# Patient Record
Sex: FEMALE | Race: WHITE | HISPANIC OR LATINO | Employment: FULL TIME | URBAN - METROPOLITAN AREA
[De-identification: names, ages, dates, MRNs, and addresses within clinical notes are randomized per-mention and may not be internally consistent; named-entity substitution may affect disease eponyms.]

---

## 2017-01-15 ENCOUNTER — HOSPITAL ENCOUNTER (EMERGENCY)
Facility: MEDICAL CENTER | Age: 22
End: 2017-01-16
Attending: EMERGENCY MEDICINE
Payer: MEDICAID

## 2017-01-15 DIAGNOSIS — Z34.92 NORMAL PREGNANCY IN SECOND TRIMESTER: ICD-10-CM

## 2017-01-15 DIAGNOSIS — K80.20 SYMPTOMATIC CHOLELITHIASIS: ICD-10-CM

## 2017-01-15 DIAGNOSIS — O99.891 BACTERIURIA DURING PREGNANCY IN SECOND TRIMESTER: ICD-10-CM

## 2017-01-15 DIAGNOSIS — R82.71 BACTERIURIA DURING PREGNANCY IN SECOND TRIMESTER: ICD-10-CM

## 2017-01-15 LAB
BASOPHILS # BLD AUTO: 0.2 % (ref 0–1.8)
BASOPHILS # BLD: 0.02 K/UL (ref 0–0.12)
EOSINOPHIL # BLD AUTO: 0.02 K/UL (ref 0–0.51)
EOSINOPHIL NFR BLD: 0.2 % (ref 0–6.9)
ERYTHROCYTE [DISTWIDTH] IN BLOOD BY AUTOMATED COUNT: 40.7 FL (ref 35.9–50)
HCT VFR BLD AUTO: 40 % (ref 37–47)
HGB BLD-MCNC: 13.2 G/DL (ref 12–16)
IMM GRANULOCYTES # BLD AUTO: 0.03 K/UL (ref 0–0.11)
IMM GRANULOCYTES NFR BLD AUTO: 0.3 % (ref 0–0.9)
LYMPHOCYTES # BLD AUTO: 1.26 K/UL (ref 1–4.8)
LYMPHOCYTES NFR BLD: 13.8 % (ref 22–41)
MCH RBC QN AUTO: 25.9 PG (ref 27–33)
MCHC RBC AUTO-ENTMCNC: 33 G/DL (ref 33.6–35)
MCV RBC AUTO: 78.6 FL (ref 81.4–97.8)
MONOCYTES # BLD AUTO: 0.58 K/UL (ref 0–0.85)
MONOCYTES NFR BLD AUTO: 6.4 % (ref 0–13.4)
NEUTROPHILS # BLD AUTO: 7.19 K/UL (ref 2–7.15)
NEUTROPHILS NFR BLD: 79.1 % (ref 44–72)
NRBC # BLD AUTO: 0 K/UL
NRBC BLD AUTO-RTO: 0 /100 WBC
PLATELET # BLD AUTO: 223 K/UL (ref 164–446)
PMV BLD AUTO: 10 FL (ref 9–12.9)
RBC # BLD AUTO: 5.09 M/UL (ref 4.2–5.4)
WBC # BLD AUTO: 9.1 K/UL (ref 4.8–10.8)

## 2017-01-15 PROCEDURE — 85025 COMPLETE CBC W/AUTO DIFF WBC: CPT

## 2017-01-15 PROCEDURE — 80053 COMPREHEN METABOLIC PANEL: CPT

## 2017-01-15 PROCEDURE — 96375 TX/PRO/DX INJ NEW DRUG ADDON: CPT

## 2017-01-15 PROCEDURE — 84702 CHORIONIC GONADOTROPIN TEST: CPT

## 2017-01-15 PROCEDURE — 96374 THER/PROPH/DIAG INJ IV PUSH: CPT

## 2017-01-15 PROCEDURE — 96361 HYDRATE IV INFUSION ADD-ON: CPT

## 2017-01-15 PROCEDURE — 700111 HCHG RX REV CODE 636 W/ 250 OVERRIDE (IP): Performed by: EMERGENCY MEDICINE

## 2017-01-15 PROCEDURE — 81001 URINALYSIS AUTO W/SCOPE: CPT

## 2017-01-15 PROCEDURE — 83690 ASSAY OF LIPASE: CPT

## 2017-01-15 PROCEDURE — 99284 EMERGENCY DEPT VISIT MOD MDM: CPT

## 2017-01-15 RX ORDER — ONDANSETRON 2 MG/ML
4 INJECTION INTRAMUSCULAR; INTRAVENOUS ONCE
Status: COMPLETED | OUTPATIENT
Start: 2017-01-16 | End: 2017-01-15

## 2017-01-15 RX ORDER — SODIUM CHLORIDE 9 MG/ML
1000 INJECTION, SOLUTION INTRAVENOUS ONCE
Status: COMPLETED | OUTPATIENT
Start: 2017-01-16 | End: 2017-01-16

## 2017-01-15 RX ADMIN — FENTANYL CITRATE 50 MCG: 50 INJECTION, SOLUTION INTRAMUSCULAR; INTRAVENOUS at 23:55

## 2017-01-15 RX ADMIN — ONDANSETRON 4 MG: 2 INJECTION, SOLUTION INTRAMUSCULAR; INTRAVENOUS at 23:55

## 2017-01-15 ASSESSMENT — PAIN SCALES - GENERAL: PAINLEVEL_OUTOF10: 10

## 2017-01-15 ASSESSMENT — LIFESTYLE VARIABLES: DO YOU DRINK ALCOHOL: NO

## 2017-01-15 NOTE — ED AVS SNAPSHOT
After Visit Summary                                                                                                                Merari Mcghee   MRN: [id number]    Department:  [facility], Emergency Dept   Date of Visit:  1/15/2017            [facility], Emergency Dept    [address]    [place] [address]    Phone:  [phone]      You were seen by     Rosey Atkinson M.D.      Your Diagnosis Was     Symptomatic cholelithiasis     K80.20       These are the medications you received during your hospitalization from 01/15/2017 2211 to 01/16/2017 0207     Date/Time Order Dose Route Action    01/16/2017 0000 NS infusion 1,000 mL 1,000 mL Intravenous New Bag    01/15/2017 2355 fentaNYL (SUBLIMAZE) injection 50 mcg 50 mcg Intravenous Given    01/15/2017 2355 ondansetron (ZOFRAN) syringe/vial injection 4 mg 4 mg Intravenous Given      Follow-up Information     1. Follow up with Shelton Davis M.D..    Specialties:  Surgery, Radiology    Contact information    [address]  [address]  [facility] [address] [phone]        Medication Information     Review all of your home medications and newly ordered medications with your primary doctor and/or pharmacist as soon as possible. Follow medication instructions as directed by your doctor and/or pharmacist.     Please keep your complete medication list with you and share with your physician. Update the information when medications are discontinued, doses are changed, or new medications (including over-the-counter products) are added; and carry medication information at all times in the event of emergency situations.               Medication List      START taking these medications        Instructions    nitrofurantoin monohydr macro 100 MG Caps   Commonly known as:  MACROBID    Take 1 Cap by mouth 2 times a day for 3 days.   Dose:  100 mg       ondansetron 4 MG Tbdp   Commonly known as:  ZOFRAN ODT    Take 1 Tab by mouth every 8  hours as needed for Nausea/Vomiting.   Dose:  4 mg               Procedures and tests performed during your visit     BETA-HCG QUANTITATIVE SERUM    CARDIAC MONITORING    CBC WITH DIFFERENTIAL    COMP METABOLIC PANEL    ESTIMATED GFR    IV SALINE LOCK    LIPASE    UR BILI ICTOTEST    URINALYSIS CULTURE, IF INDICATED    URINE MICROSCOPIC (W/UA)    US-GALLBLADDER    US-OB LIMITED TRANSABDOMINAL        Discharge Instructions       Cólico biliar  (Biliary Colic)  El cólico biliar es un dolor en la región superior del abdomen. El dolor:  · Generalmente, se siente en la región superior derecha del abdomen, cookie también puede aparecer en la región central, alessandra debajo del esternón.  · Puede irradiarse a la espalda, hacia el omóplato derecho.  · Puede ser adali o discontinuo.  · Puede estar acompañado de náuseas y vómitos.  La mayor parte del tiempo, desaparece en el término de 1 a 5 horas. Manish vez que el dolor intenso desaparece, puede kathi dolor abdominal leve que se prolonga sandip aproximadamente 24 horas.  La causa del cólico biliar es manish obstrucción en las vías biliares. Las vías biliares son conductos que transportan bilis, un líquido que ayuda a digerir las grasas, desde la vesícula biliar hasta el intestino fabian. Generalmente, el cólico biliar ocurre después de comer, cuando el sistema digestivo necesita bilis. El dolor aparece cuando las células musculares se contraen bruscamente para intentar  la obstrucción, para que la bilis pueda pasar.  INSTRUCCIONES PARA EL CUIDADO EN EL HOGAR  · [place] los medicamentos solamente liam se lo haya indicado el médico.  · Ayala suficiente líquido para mantener la orina gaye o de color amarillo pálido.  · Evite los alimentos grasos y fritos. Claudia tipo de alimentos aumentan la demanda de bilis del organismo.  · Evite los alimentos que intensifican el dolor.  · No coma en exceso.  · No ingiera manish comida abundante después de ayunar.  SOLICITE ATENCIÓN MÉDICA  SI:  · Tiene fiebre.  · El dolor empeora.  · Vomita.  · Tiene náuseas que le impiden la ingesta de comidas y bebidas.  SOLICITE ATENCIÓN MÉDICA DE INMEDIATO SI:  · De manera repentina, tiene fiebre y escalofríos.  · Observa manish coloración amarillenta (ictericia) de:  ¨ La piel.  ¨ Las partes vijay de los ojos.  ¨ Las membranas mucosas.  · Siente dolor continuo o intenso que no se isa con los medicamentos.  · Tiene náuseas y vómitos que no se alivian con los medicamentos.  · Tiene mareos o se desmaya.     Esta información no tiene liam fin reemplazar el consejo del médico. Asegúrese de hacerle al médico cualquier pregunta que tenga.     Document Released: 03/26/2009 Document Revised: 05/03/2016  Whitt[employer] Interactive Patient Education ©2016 [employer]            Patient Information     Patient Information    Following emergency treatment: all patient requiring follow-up care must return either to a private physician or a clinic if your condition worsens before you are able to obtain further medical attention, please return to the emergency room.     Billing Information    At [facility], we work to make the billing process streamlined for our patients.  Our Representatives are here to answer any questions you may have regarding your hospital bill.  If you have insurance coverage and have supplied your insurance information to us, we will submit a claim to your insurer on your behalf.  Should you have any questions regarding your bill, we can be reached online or by phone as follows:  Online: You are able pay your bills online or live chat with our representatives about any billing questions you may have. We are here to help Monday - Friday from 8:00am to 7:30pm and 9:00am - 12:00pm on Saturdays.  Please visit [email or web address]  for more information.   Phone:  [phone] or [phone]    Please note that your emergency physician, surgeon, pathologist, radiologist,  anesthesiologist, and other specialists are not employed by [facility] [place] and will therefore bill separately for their services.  Please contact them directly for any questions concerning their bills at the numbers below:     Emergency Physician Services:  [phone]  [facility]:  [phone]  [facility]:  [phone]  [facility]:  [phone]    1. Your final bill may vary from the amount quoted upon discharge if all procedures are not complete at that time, or if your doctor has additional procedures of which we are not aware. You will receive an additional bill if you return to the Emergency Department at [facility] for suture removal regardless of the facility of which the sutures were placed.     2. Please arrange for settlement of this account at the emergency registration.    3. All self-pay accounts are due in full at the time of treatment.  If you are unable to meet this obligation then payment is expected within 4-5 days.     4. If you have had radiology studies (CT, X-ray, Ultrasound, MRI), you have received a preliminary result during your emergency department visit. Please contact the radiology department [phone] to receive a copy of your final result. Please discuss the Final result with your primary physician or with the follow up physician provided.     Crisis Hotline:  [place] Crisis Hotline:  9-317-VJXAWOP or 1-827.902.7073  [place] Crisis Hotline:    1-724.137.8246 or 561-599-4143         ED Discharge Follow Up Questions    1. In order to provide you with very good care, we would like to follow up with a phone call in the next few days.  May we have your permission to contact you?     YES /  NO    2. What is the best phone number to call you? (       )_____-__________    3. What is the best time to call you?      Morning  /  Afternoon  /  Evening                   Patient Signature:   ____________________________________________________________    Date:  ____________________________________________________________      Your appointments     Jan 24, 2017  2:00 PM   New OB Exam with PC INTAKE, NEW OB   [facility])    [address] [id number] [phone]

## 2017-01-15 NOTE — ED AVS SNAPSHOT
Wavecraft Access Code: Activation code not generated  Current Wavecraft Status: Patient Declined    Your email address is not on file at [facility].  Email Addresses are required for you to sign up for Wavecraft, please contact [phone] to verify your personal information and to provide your email address prior to attempting to register for Wavecraft.    Merari Mcghee  [address]   [address]   [place] [address]    [employer]  A secure, online tool to manage your health information     [facility]’s Wavecraft® is a secure, online tool that connects you to your personalized health information from the privacy of your home -- day or night - making it very easy for you to manage your healthcare. Once the activation process is completed, you can even access your medical information using the Wavecraft sandip, which is available for free in the Apple Sandip store or Google Play store.     To learn more about Wavecraft, visit [email or web address]    There are two levels of access available (as shown below):   My Chart Features  [facility] Primary Care Doctor [facility]  Specialists [facility]  Urgent  Care Non-[facility] Primary Care Doctor   Email your healthcare team securely and privately 24/7 X X X    Manage appointments: schedule your next appointment; view details of past/upcoming appointments X      Request prescription refills. X      View recent personal medical records, including lab and immunizations X X X X   View health record, including health history, allergies, medications X X X X   Read reports about your outpatient visits, procedures, consult and ER notes X X X X   See your discharge summary, which is a recap of your hospital and/or ER visit that includes your diagnosis, lab results, and care plan X X  X     How to register for [employer]:  Once your e-mail address has been verified, follow the following steps to sign up for [employer].     1. Go to  [email or web address]  2. Click on the Sign Up Now box, which takes you to the  New Member Sign Up page. You will need to provide the following information:  a. Enter your [employer] Access Code exactly as it appears at the top of this page. (You will not need to use this code after you’ve completed the sign-up process. If you do not sign up before the expiration date, you must request a new code.)   b. Enter your date of birth.   c. Enter your home email address.   d. Click Submit, and follow the next screen’s instructions.  3. Create a [employer] ID. This will be your [employer] login ID and cannot be changed, so think of one that is secure and easy to remember.  4. Create a [employer] password. You can change your password at any time.  5. Enter your Password Reset Question and Answer. This can be used at a later time if you forget your password.   6. Enter your e-mail address. This allows you to receive e-mail notifications when new information is available in [employer].  7. Click Sign Up. You can now view your health information.    For assistance activating your [employer] account, call [phone]

## 2017-01-15 NOTE — ED AVS SNAPSHOT
1/16/2017          Merari Mcghee  [address]   [address]   [place] [address]    Dear Merari:    [facility] wants to ensure your discharge home is safe and you or your loved ones have had all your questions answered regarding your care after you leave the hospital.    You may receive a telephone call within two days of your discharge.  This call is to make certain you understand your discharge instructions as well as ensure we provided you with the best care possible during your stay with us.     The call will only last approximately 3-5 minutes and will be done by a nurse.    Once again, we want to ensure your discharge home is safe and that you have a clear understanding of any next steps in your care.  If you have any questions or concerns, please do not hesitate to contact us, we are here for you.  Thank you for choosing [facility] for your healthcare needs.    Sincerely,    Trey Rose    [facility]

## 2017-01-16 ENCOUNTER — APPOINTMENT (OUTPATIENT)
Dept: RADIOLOGY | Facility: MEDICAL CENTER | Age: 22
End: 2017-01-16
Attending: EMERGENCY MEDICINE
Payer: MEDICAID

## 2017-01-16 VITALS
HEART RATE: 92 BPM | HEIGHT: 66 IN | SYSTOLIC BLOOD PRESSURE: 124 MMHG | TEMPERATURE: 97.9 F | OXYGEN SATURATION: 99 % | DIASTOLIC BLOOD PRESSURE: 84 MMHG | BODY MASS INDEX: 29.34 KG/M2 | RESPIRATION RATE: 18 BRPM | WEIGHT: 182.54 LBS

## 2017-01-16 LAB
ALBUMIN SERPL BCP-MCNC: 3.9 G/DL (ref 3.2–4.9)
ALBUMIN/GLOB SERPL: 1.1 G/DL
ALP SERPL-CCNC: 117 U/L (ref 30–99)
ALT SERPL-CCNC: 57 U/L (ref 2–50)
ANION GAP SERPL CALC-SCNC: 11 MMOL/L (ref 0–11.9)
APPEARANCE UR: ABNORMAL
AST SERPL-CCNC: 34 U/L (ref 12–45)
B-HCG SERPL-ACNC: ABNORMAL MIU/ML (ref 0–5)
BACTERIA #/AREA URNS HPF: ABNORMAL /HPF
BILIRUB SERPL-MCNC: 0.8 MG/DL (ref 0.1–1.5)
BILIRUB UR QL CFM: POSITIVE
BUN SERPL-MCNC: 6 MG/DL (ref 8–22)
CALCIUM SERPL-MCNC: 9.1 MG/DL (ref 8.5–10.5)
CHLORIDE SERPL-SCNC: 100 MMOL/L (ref 96–112)
CO2 SERPL-SCNC: 23 MMOL/L (ref 20–33)
COLOR UR: ABNORMAL
CREAT SERPL-MCNC: 0.52 MG/DL (ref 0.5–1.4)
CULTURE IF INDICATED INDCX: NO UA CULTURE
EPI CELLS #/AREA URNS HPF: ABNORMAL /HPF
GFR SERPL CREATININE-BSD FRML MDRD: >60 ML/MIN/1.73 M 2
GLOBULIN SER CALC-MCNC: 3.5 G/DL (ref 1.9–3.5)
GLUCOSE SERPL-MCNC: 105 MG/DL (ref 65–99)
GLUCOSE UR STRIP.AUTO-MCNC: NEGATIVE MG/DL
KETONES UR STRIP.AUTO-MCNC: 80 MG/DL
LEUKOCYTE ESTERASE UR QL STRIP.AUTO: NEGATIVE
LIPASE SERPL-CCNC: 19 U/L (ref 11–82)
MICRO URNS: ABNORMAL
MUCOUS THREADS #/AREA URNS HPF: ABNORMAL /HPF
NITRITE UR QL STRIP.AUTO: NEGATIVE
PH UR STRIP.AUTO: 6 [PH]
POTASSIUM SERPL-SCNC: 3.3 MMOL/L (ref 3.6–5.5)
PROT SERPL-MCNC: 7.4 G/DL (ref 6–8.2)
PROT UR QL STRIP: 30 MG/DL
RBC # URNS HPF: ABNORMAL /HPF
RBC UR QL AUTO: NEGATIVE
SODIUM SERPL-SCNC: 134 MMOL/L (ref 135–145)
SP GR UR STRIP.AUTO: 1.03
WBC #/AREA URNS HPF: ABNORMAL /HPF

## 2017-01-16 PROCEDURE — 76815 OB US LIMITED FETUS(S): CPT

## 2017-01-16 PROCEDURE — 700105 HCHG RX REV CODE 258: Performed by: EMERGENCY MEDICINE

## 2017-01-16 PROCEDURE — 76705 ECHO EXAM OF ABDOMEN: CPT

## 2017-01-16 RX ORDER — NITROFURANTOIN 25; 75 MG/1; MG/1
100 CAPSULE ORAL 2 TIMES DAILY
Qty: 6 CAP | Refills: 0 | Status: SHIPPED | OUTPATIENT
Start: 2017-01-16 | End: 2017-01-19

## 2017-01-16 RX ORDER — ONDANSETRON 4 MG/1
4 TABLET, ORALLY DISINTEGRATING ORAL EVERY 8 HOURS PRN
Qty: 10 TAB | Refills: 0 | Status: SHIPPED | OUTPATIENT
Start: 2017-01-16 | End: 2017-06-06

## 2017-01-16 RX ADMIN — SODIUM CHLORIDE 1000 ML: 9 INJECTION, SOLUTION INTRAVENOUS at 00:00

## 2017-01-16 ASSESSMENT — PAIN SCALES - GENERAL: PAINLEVEL_OUTOF10: 6

## 2017-01-16 NOTE — ED NOTES
Pt ambulatory to triage c/o upper abdominal pain with associated N/V/D since yesterday.  Pt reports hx pancreatitis & is also 17 weeks pregnant.

## 2017-01-16 NOTE — DISCHARGE INSTRUCTIONS
Cólico biliar  (Biliary Colic)  El cólico biliar es un dolor en la región superior del abdomen. El dolor:  · Generalmente, se siente en la región superior derecha del abdomen, cookie también puede aparecer en la región central, alessandra debajo del esternón.  · Puede irradiarse a la espalda, hacia el omóplato derecho.  · Puede ser adali o discontinuo.  · Puede estar acompañado de náuseas y vómitos.  La mayor parte del tiempo, desaparece en el término de 1 a 5 horas. Manish vez que el dolor intenso desaparece, puede kathi dolor abdominal leve que se prolonga sandip aproximadamente 24 horas.  La causa del cólico biliar es manish obstrucción en las vías biliares. Las vías biliares son conductos que transportan bilis, un líquido que ayuda a digerir las grasas, desde la vesícula biliar hasta el intestino fabian. Generalmente, el cólico biliar ocurre después de comer, cuando el sistema digestivo necesita bilis. El dolor aparece cuando las células musculares se contraen bruscamente para intentar  la obstrucción, para que la bilis pueda pasar.  INSTRUCCIONES PARA EL CUIDADO EN EL HOGAR  · [place] los medicamentos solamente liam se lo haya indicado el médico.  · Ayala suficiente líquido para mantener la orina gaye o de color amarillo pálido.  · Evite los alimentos grasos y fritos. Claudia tipo de alimentos aumentan la demanda de bilis del organismo.  · Evite los alimentos que intensifican el dolor.  · No coma en exceso.  · No ingiera manish comida abundante después de ayunar.  SOLICITE ATENCIÓN MÉDICA SI:  · Tiene fiebre.  · El dolor empeora.  · Vomita.  · Tiene náuseas que le impiden la ingesta de comidas y bebidas.  SOLICITE ATENCIÓN MÉDICA DE INMEDIATO SI:  · De manera repentina, tiene fiebre y escalofríos.  · Observa manish coloración amarillenta (ictericia) de:  ¨ La piel.  ¨ Las partes vijay de los ojos.  ¨ Las membranas mucosas.  · Siente dolor continuo o intenso que no se isa con los medicamentos.  · Tiene náuseas y vómitos  que no se alivian con los medicamentos.  · Tiene mareos o se desmaya.     Esta información no tiene liam fin reemplazar el consejo del médico. Asegúrese de hacerle al médico cualquier pregunta que tenga.     Document Released: 03/26/2009 Document Revised: 05/03/2016  Elsevier Interactive Patient Education ©2016 Elsevier Inc.

## 2017-01-16 NOTE — ED PROVIDER NOTES
"ED Provider Note    CHIEF COMPLAINT  Chief Complaint   Patient presents with   • Abdominal Pain     upper abdomen since 0200, hx pancreatitis   • Nausea/Vomiting/Diarrhea       HPI  Merari Mcghee is a 21 y.o. female who presents to the emergency department with chief complaint epigastric abdominal pain nausea and vomiting. The patient states she has a history of gallstone pancreatitis last bout was approximately a year ago in [place] and she has not had her gallbladder taken out. She states she's had pain since 2 AM and nausea and vomiting throughout the day. Patient states the pain is sharp and radiates to her upper back and it feels similar to last him shipping otitis. She denies any vaginal discharge or lateral bleeding but endorses that she is presently 17 weeks pregnant. She states this is her 2nd pregnancy, and has follow-up with an ObGyn in approximately 3-4 weeks for an ultrasound, this will be the 1st one of her pregnancy and she does not know the name of her ObGyn. Endorses the pain is currently a 7 out of 10 and her nausea is very strong as she just vomited in the bathroom    REVIEW OF SYSTEMS  See HPI for further details. All other systems are negative.     PAST MEDICAL HISTORY       SOCIAL HISTORY  Social History     Social History Main Topics   • Smoking status: Not on file   • Smokeless tobacco: Not on file   • Alcohol Use: Not on file   • Drug Use: Not on file   • Sexual Activity: Not on file       SURGICAL HISTORY  patient denies any surgical history    CURRENT MEDICATIONS  Home Medications     **Home medications have not yet been reviewed for this encounter**          ALLERGIES  No Known Allergies    PHYSICAL EXAM  VITAL SIGNS: /82 mmHg  Pulse 101  Temp(Src) 36.6 °C (97.9 °F)  Resp 18  Ht 1.676 m (5' 6\")  Wt 82.8 kg (182 lb 8.7 oz)  BMI 29.48 kg/m2  SpO2 99%   Pulse ox interpretation: I interpret this pulse ox as normal.  Constitutional: Alert in no apparent distress.  HENT: No signs " of trauma, Bilateral external ears normal, Nose normal.   Eyes: Pupils are equal and reactive, Conjunctiva normal, Non-icteric.   Neck: Normal range of motion, No tenderness, Supple, No stridor.   Lymphatic: No lymphadenopathy noted.   Cardiovascular: Regular rate and rhythm, no murmurs.   Thorax & Lungs: Normal breath sounds, No respiratory distress, No wheezing, No chest tenderness.   Abdomen: Bowel sounds normal, epigastric ttp, without rebound or guarding, fundus felt near the umbilicus No pulsatile masses. No peritoneal signs.  Skin: Warm, Dry, No erythema, No rash.   Back: No bony tenderness, No CVA tenderness.   Extremities: Intact distal pulses, No edema, No tenderness, No cyanosis,  Negative Michelle's sign.   Musculoskeletal: Good range of motion in all major joints. No tenderness to palpation or major deformities noted.   Neurologic: Alert , Normal motor function, Normal sensory function, No focal deficits noted.   Psychiatric: Affect normal, Judgment normal, Mood normal.       DIFFERENTIAL DIAGNOSIS AND WORK UP PLAN    This is a 21 y.o. female who presents with epigastric pain with nausea and vomiting, she has a history of gallstone pancreatitis without cholecystectomy. The patient is afebrile her lower abdomen is soft but is positive for , we'll assess for cholecystitis versus symptomatic cholelithiasis versus choledocholithiasis versus pancreatitis versus urinary tract infection pyelonephritis. Patient will receive IV fluids anti-medics pain medicine, discuss with her at length the risks and evening pain medicine while pregnant she understands and agrees to proceed with the medication. We'll also perform an ultrasound right upper quadrant as well as OB ultrasound she does not have one this pregnancy.    DIAGNOSTIC STUDIES / PROCEDURES    LABS  Pertinent Lab Findings  Normal wbc w L shift without bands  , K 3.3  hcg 32K  UA + bacteria, without other evidence of infection   Lipase normal, alk  phos 117       RADIOLOGY  US-GALLBLADDER   Final Result      1.  Cholelithiasis with gallbladder wall thickening. This could represent cholecystitis.      2.  No overlying gallbladder tenderness noted. No biliary dilatation.      3.  Pancreas not visualized.      4.  Otherwise negative right upper quadrant ultrasound.         US-OB LIMITED TRANSABDOMINAL   Final Result      Single intrauterine pregnancy of an estimated gestational age of Average 17w 4d with an estimated date of delivery of 6/22/2017. Clinical GREG 6/23/2017.         Fetal survey within normal limits. No perigestational hemorrhage identified.        The radiologist's interpretation of all radiological studies have been reviewed by me.      COURSE & MEDICAL DECISION MAKING  Pertinent Labs & Imaging studies reviewed. (See chart for details)    1:28 AM  Reassessed the patient, her abd is soft non tender, she is feeling better and is now hungry and thirsty. Discussed with her ultrasound findings, she has a normal 2nd trimester gestation, she does have an ObGyn she is to follow-up within the next few weeks. She does have cholelithiasis with mild wall thickening, however there is no pericholecystic fluid she is afebrile she has no white count and her abdomen is now soft and nontender. She likely is a symptomatic cholelithiasis versus early cholecystitis, however she is feeling better and hungry, will perform by mouth trial this time and reassess patient    1:59 AM  The patient is tolerating PO states she is feeling better without nausea or any pain. Patient does have bacteria in pregnancy will be treated with 3 days of Macrobid. She was given strict return precautions including worsening pain nausea vomiting or fevers which would indicate that she is evolving into cholecystitis. She will be referred to the general surgeon on call for an outpatient cystectomy - she understands and feels comfortable going home    /84 mmHg  Pulse 92  Temp(Src) 36.6 °C  "(97.9 °F)  Resp 18  Ht 1.676 m (5' 6\")  Wt 82.8 kg (182 lb 8.7 oz)  BMI 29.48 kg/m2  SpO2 99%      The patient will return for worsening symptoms and is stable at the time of discharge. The patient verbalizes understanding and will comply.    FOLLOW UP    Shelton Davis M.D.  [address]  [address]  [facility] [address]  [phone]              FINAL IMPRESSION  1. Symptomatic cholelithiasis    2. Normal pregnancy in second trimester    3. Bacteriuria during pregnancy in second trimester            Electronically signed by: Rosey Atkinson, 1/15/2017 11:32 PM    This dictation has been created using voice recognition software and/or scribes. The accuracy of the dictation is limited by the abilities of the software and the expertise of the scribes. I expect there may be some errors of grammar and possibly content. I made every attempt to manually correct the errors within my dictation. However, errors related to voice recognition software and/or scribes may still exist and should be interpreted within the appropriate context.    "

## 2017-01-24 ENCOUNTER — INITIAL PRENATAL (OUTPATIENT)
Dept: OBGYN | Facility: CLINIC | Age: 22
End: 2017-01-24
Payer: MEDICAID

## 2017-01-24 ENCOUNTER — HOSPITAL ENCOUNTER (OUTPATIENT)
Facility: MEDICAL CENTER | Age: 22
End: 2017-01-24
Attending: NURSE PRACTITIONER
Payer: COMMERCIAL

## 2017-01-24 VITALS
BODY MASS INDEX: 29.8 KG/M2 | HEIGHT: 66 IN | SYSTOLIC BLOOD PRESSURE: 110 MMHG | DIASTOLIC BLOOD PRESSURE: 66 MMHG | WEIGHT: 185.4 LBS

## 2017-01-24 DIAGNOSIS — K80.20 GALLSTONES: ICD-10-CM

## 2017-01-24 DIAGNOSIS — Z34.01 ENCOUNTER FOR SUPERVISION OF NORMAL FIRST PREGNANCY IN FIRST TRIMESTER: ICD-10-CM

## 2017-01-24 DIAGNOSIS — Z34.82 ENCOUNTER FOR SUPERVISION OF NORMAL PREGNANCY IN MULTIGRAVIDA IN SECOND TRIMESTER: Primary | ICD-10-CM

## 2017-01-24 LAB
APPEARANCE UR: NORMAL
BILIRUB UR STRIP-MCNC: NORMAL MG/DL
COLOR UR AUTO: NORMAL
GLUCOSE UR STRIP.AUTO-MCNC: NEGATIVE MG/DL
KETONES UR STRIP.AUTO-MCNC: NEGATIVE MG/DL
LEUKOCYTE ESTERASE UR QL STRIP.AUTO: NEGATIVE
NITRITE UR QL STRIP.AUTO: NEGATIVE
PH UR STRIP.AUTO: 7.5 [PH] (ref 5–8)
PROT UR QL STRIP: NORMAL MG/DL
RBC UR QL AUTO: NEGATIVE
SP GR UR STRIP.AUTO: 1.01
UROBILINOGEN UR STRIP-MCNC: NORMAL MG/DL

## 2017-01-24 PROCEDURE — 81002 URINALYSIS NONAUTO W/O SCOPE: CPT | Performed by: NURSE PRACTITIONER

## 2017-01-24 PROCEDURE — 59401 PR NEW OB VISIT: CPT | Performed by: NURSE PRACTITIONER

## 2017-01-24 ASSESSMENT — ENCOUNTER SYMPTOMS
EYES NEGATIVE: 1
CONSTITUTIONAL NEGATIVE: 1
GASTROINTESTINAL NEGATIVE: 1
RESPIRATORY NEGATIVE: 1
CARDIOVASCULAR NEGATIVE: 1
MUSCULOSKELETAL NEGATIVE: 1
PSYCHIATRIC NEGATIVE: 1
NEUROLOGICAL NEGATIVE: 1

## 2017-01-24 NOTE — PROGRESS NOTES
"Subjective:      Merari Mcghee is a 21 y.o. female who presents with No chief complaint on file.    S:  Merari Mcghee is a 21 y.o.  female  @ EGA: 18w4d GREG: Estimated Date of Delivery: 17  per US in ED. who presents for her new OB exam.  She was seen in ED on  and was dx'd w UTI and gallstones. Was txed w abx and scheduled for surgery on 2017 for removal of gallstones.  Desires AFP.  Declines CF.  Reports positive FM, no VB, LOF, or cramping.  Denies dysuria, vaginal DC.  Pt is  and lives with FOB. Works in .  Pregnancy is desired.  Declines Centering Pregnancy.    O:    Filed Vitals:    17 1424   BP: 110/66   Height: 1.676 m (5' 6\")   Weight: 84.097 kg (185 lb 6.4 oz)    See H&P Prenatal Physical.  Wet mount: not done        FHTs: 150        Fundal ht: 18 cm     A:   1.  IUP @ 18w4d GREG: Estimated Date of Delivery: 17 per US         2.  S=D        3.    Patient Active Problem List    Diagnosis Date Noted   • Encounter for supervision of normal pregnancy in multigravida in second trimester 2017   • Gallstones, scheduled for surgery on 2017         P:  1.  GC/CT done . Pap done.         2.  Prenatal labs ordered - lab slip given        3.  Discussed PNV, diet, and adequate water intake        4.  NOB packet given        5.  Return to office in 4 wks        6.  Complete OB US asap          HPI    Review of Systems   Constitutional: Negative.    HENT: Negative.    Eyes: Negative.    Respiratory: Negative.    Cardiovascular: Negative.    Gastrointestinal: Negative.    Genitourinary: Negative.         Uterus enlarged   Musculoskeletal: Negative.    Skin: Negative.    Neurological: Negative.    Endo/Heme/Allergies: Negative.    Psychiatric/Behavioral: Negative.           Objective:     /66 mmHg  Ht 1.676 m (5' 6\")  Wt 84.097 kg (185 lb 6.4 oz)  BMI 29.94 kg/m2  LMP 2016 (Exact Date)     Physical Exam   Constitutional: She is " oriented to person, place, and time. She appears well-developed and well-nourished.   HENT:   Head: Normocephalic and atraumatic.   Eyes: Pupils are equal, round, and reactive to light.   Neck: Normal range of motion. Neck supple.   Cardiovascular: Normal rate, regular rhythm and normal heart sounds.    Pulmonary/Chest: Effort normal and breath sounds normal.   Abdominal: Soft.   Genitourinary: Vagina normal and uterus normal.   Musculoskeletal: Normal range of motion.   Neurological: She is alert and oriented to person, place, and time. She has normal reflexes.   Skin: Skin is warm and dry.   Psychiatric: She has a normal mood and affect. Her behavior is normal. Judgment and thought content normal.   Nursing note and vitals reviewed.              Assessment/Plan:     1. Encounter for supervision of normal pregnancy in multigravida in second trimester      2. Encounter for supervision of normal first pregnancy in first trimester    - CONSENT FOR CYSTIC FIBROSIS CARRIER TEST  - POCT Urinalysis  - Prenatal Multivit-Min-Fe-FA (PRENATAL VITAMINS PO); Take  by mouth.  - US-OB 2ND 3RD TRI COMPLETE; Future  - PREG CNTR PRENATAL PN; Future  - THINPREP RFLX HPV ASCUS W/CTNG; Future  - AFP TETRA; Future    3. Gallstones, scheduled for surgery on 2/2/2017

## 2017-01-24 NOTE — PROGRESS NOTES
Pt here for New OB today  Phone: [phone]  Pharmacy verified  Pt is taking PNV  Pt was seen at [facility] on 1-16-17, Dx'd with UTI and Gallstones   Pt is scheduled for laparoscopic cholecystectomy on 2-2-17 with Dr. Ryan Diana AFP  Declines BTL

## 2017-01-24 NOTE — MR AVS SNAPSHOT
"Merari Mcghee   2017 2:00 PM   Initial Prenatal   MRN: [id number]    [facility]   Dept Phone:  [phone]    Description:  Female : 1995   Provider:  DEEPA Duff; PC INTAKE           Allergies as of 2017     No Known Allergies      You were diagnosed with     Encounter for supervision of normal pregnancy in multigravida in second trimester   [2530107]  -  Primary     Encounter for supervision of normal first pregnancy in first trimester   [[id number]]       Gallstones   [[id number]]         Vital Signs     Blood Pressure Height Weight Body Mass Index Last Menstrual Period Smoking Status    110/66 mmHg 1.676 m (5' 6\") 84.097 kg (185 lb 6.4 oz) 29.94 kg/m2 2016 (Exact Date) Never Smoker       Basic Information     Date Of Birth Sex Race Ethnicity Preferred Language    1995 Female White  Origin (Vietnamese,American,Thai,Mirza, etc) Vietnamese      Problem List              ICD-10-CM Priority Class Noted - Resolved    Encounter for supervision of normal pregnancy in multigravida in second trimester Z34.82   2017 - Present    Gallstones, scheduled for surgery on 2017 K80.20   2017 - Present      Health Maintenance     Patient has no pending health maintenance at this time      Results     POCT Urinalysis      Component Value Standard Range & Units    POC Color  Negative    POC Appearance  Negative    POC Leukocyte Esterase Negative Negative    POC Nitrites Negative Negative    POC Urobiligen  Negative (0.2) mg/dL    POC Protein Trace Negative mg/dL    POC Urine PH 7.5 5.0 - 8.0    POC Blood Negative Negative    POC Specific Gravity 1.015 <1.005 - >1.030    POC Ketones Negative Negative mg/dL    POC Biliruben  Negative mg/dL    POC Glucose Negative Negative mg/dL                        Current Immunizations     No immunizations on file.      Below and/or attached are the medications your provider expects you to take. Review all of your home " medications and newly ordered medications with your provider and/or pharmacist. Follow medication instructions as directed by your provider and/or pharmacist. Please keep your medication list with you and share with your provider. Update the information when medications are discontinued, doses are changed, or new medications (including over-the-counter products) are added; and carry medication information at all times in the event of emergency situations     Allergies:  No Known Allergies          Medications  Valid as of: January 24, 2017 -  2:59 PM    Generic Name Brand Name Tablet Size Instructions for use    Ondansetron (TABLET DISPERSIBLE) ZOFRAN ODT 4 MG Take 1 Tab by mouth every 8 hours as needed for Nausea/Vomiting.        Prenatal Multivit-Min-Fe-FA   Take  by mouth.        .                 Medicines prescribed today were sent to:     [facility] [address] [place] [address], [place] - [address] [facility] [place] [address]    Phone: [phone] Fax: [phone]    Open 24 Hours?: No      Medication refill instructions:       If your prescription bottle indicates you have medication refills left, it is not necessary to call your provider’s office. Please contact your pharmacy and they will refill your medication.    If your prescription bottle indicates you do not have any refills left, you may request refills at any time through one of the following ways: The online obiwon system (except Urgent Care), by calling your provider’s office, or by asking your pharmacy to contact your provider’s office with a refill request. Medication refills are processed only during regular business hours and may not be available until the next business day. Your provider may request additional information or to have a follow-up visit with you prior to refilling your medication.   *Please Note: Medication refills are assigned a new Rx number when refilled electronically. Your pharmacy may  indicate that no refills were authorized even though a new prescription for the same medication is available at the pharmacy. Please request the medicine by name with the pharmacy before contacting your provider for a refill.        Your To Do List     Future Labs/Procedures Complete By Expires    AFP TETRA  As directed 1/25/2018    PREG CNTR PRENATAL PN  As directed 1/25/2018    THINPREP RFLX HPV ASCUS W/CTNG  As directed 1/25/2018    US-OB 2ND 3RD TRI COMPLETE  As directed 1/25/2018         MyChart Status: Patient Declined

## 2017-01-26 LAB
C TRACH DNA GENITAL QL NAA+PROBE: NEGATIVE
CYTOLOGY REG CYTOL: NORMAL
N GONORRHOEA DNA GENITAL QL NAA+PROBE: NEGATIVE
SPECIMEN SOURCE: NORMAL

## 2017-01-27 ENCOUNTER — HOSPITAL ENCOUNTER (OUTPATIENT)
Dept: LAB | Facility: MEDICAL CENTER | Age: 22
End: 2017-01-27
Attending: NURSE PRACTITIONER
Payer: MEDICAID

## 2017-01-27 DIAGNOSIS — Z34.01 ENCOUNTER FOR SUPERVISION OF NORMAL FIRST PREGNANCY IN FIRST TRIMESTER: ICD-10-CM

## 2017-01-27 LAB
ABO GROUP BLD: NORMAL
APPEARANCE UR: ABNORMAL
BACTERIA #/AREA URNS HPF: ABNORMAL /HPF
BASOPHILS # BLD AUTO: 0.05 K/UL (ref 0–0.12)
BASOPHILS NFR BLD AUTO: 0.6 % (ref 0–1.8)
BILIRUB UR QL STRIP.AUTO: NEGATIVE
BLD GP AB SCN SERPL QL: NORMAL
COLOR UR AUTO: ABNORMAL
CULTURE IF INDICATED INDCX: NO UA CULTURE
EOSINOPHIL # BLD: 0.14 K/UL (ref 0–0.51)
EOSINOPHIL NFR BLD AUTO: 1.7 % (ref 0–6.9)
EPITHELIAL CELLS 1715: ABNORMAL /HPF
ERYTHROCYTE [DISTWIDTH] IN BLOOD BY AUTOMATED COUNT: 41.5 FL (ref 35.9–50)
GLUCOSE UR STRIP.AUTO-MCNC: NEGATIVE MG/DL
HBV SURFACE AG SERPL QL IA: NEGATIVE
HCT VFR BLD AUTO: 35.2 % (ref 37–47)
HGB BLD-MCNC: 11.4 G/DL (ref 12–16)
HIV 1+2 AB+HIV1 P24 AG SERPL QL IA: NON REACTIVE
IMM GRANULOCYTES # BLD AUTO: 0.03 K/UL (ref 0–0.11)
IMM GRANULOCYTES NFR BLD AUTO: 0.4 % (ref 0–0.9)
KETONES UR STRIP.AUTO-MCNC: NEGATIVE MG/DL
LEUKOCYTE ESTERASE UR QL STRIP.AUTO: NEGATIVE
LYMPHOCYTES # BLD: 1.28 K/UL (ref 1–4.8)
LYMPHOCYTES NFR BLD AUTO: 15.9 % (ref 22–41)
MCH RBC QN AUTO: 26.5 PG (ref 27–33)
MCHC RBC AUTO-ENTMCNC: 32.4 G/DL (ref 33.6–35)
MCV RBC AUTO: 81.7 FL (ref 81.4–97.8)
MICRO URNS: ABNORMAL
MONOCYTES # BLD: 0.47 K/UL (ref 0–0.85)
MONOCYTES NFR BLD AUTO: 5.8 % (ref 0–13.4)
MUCOUS THREADS URNS QL MICRO: ABNORMAL /HPF
NEUTROPHILS # BLD: 6.08 K/UL (ref 2–7.15)
NEUTROPHILS NFR BLD AUTO: 75.6 % (ref 44–72)
NITRITE UR QL STRIP.AUTO: NEGATIVE
NRBC # BLD AUTO: 0 K/UL
NRBC BLD-RTO: 0 /100 WBC
PH UR: 7.5 [PH]
PLATELET # BLD AUTO: 306 K/UL (ref 164–446)
PMV BLD AUTO: 9.8 FL (ref 9–12.9)
PROT UR QL STRIP: NEGATIVE MG/DL
RBC # BLD AUTO: 4.31 M/UL (ref 4.2–5.4)
RBC #/AREA URNS HPF: ABNORMAL /HPF
RBC UR QL AUTO: NEGATIVE
RH BLD: NORMAL
RUBV IGG SERPL IA-ACNC: 83.4 IU/ML
SP GR UR STRIP.AUTO: 1.02
TREPONEMA PALLIDUM IGG+IGM AB [PRESENCE] IN SERUM OR PLASMA BY IMMUNOASSAY: NON REACTIVE
WBC # BLD AUTO: 8.1 K/UL (ref 4.8–10.8)
WBC #/AREA URNS HPF: ABNORMAL /HPF

## 2017-01-27 PROCEDURE — 85025 COMPLETE CBC W/AUTO DIFF WBC: CPT

## 2017-01-27 PROCEDURE — 86850 RBC ANTIBODY SCREEN: CPT

## 2017-01-27 PROCEDURE — 86780 TREPONEMA PALLIDUM: CPT

## 2017-01-27 PROCEDURE — 36415 COLL VENOUS BLD VENIPUNCTURE: CPT

## 2017-01-27 PROCEDURE — 81511 FTL CGEN ABNOR FOUR ANAL: CPT

## 2017-01-27 PROCEDURE — 87389 HIV-1 AG W/HIV-1&-2 AB AG IA: CPT

## 2017-01-27 PROCEDURE — 86901 BLOOD TYPING SEROLOGIC RH(D): CPT

## 2017-01-27 PROCEDURE — 81001 URINALYSIS AUTO W/SCOPE: CPT

## 2017-01-27 PROCEDURE — 86900 BLOOD TYPING SEROLOGIC ABO: CPT

## 2017-01-27 PROCEDURE — 87340 HEPATITIS B SURFACE AG IA: CPT

## 2017-01-27 PROCEDURE — 86762 RUBELLA ANTIBODY: CPT

## 2017-01-30 ENCOUNTER — APPOINTMENT (OUTPATIENT)
Dept: ADMISSIONS | Facility: MEDICAL CENTER | Age: 22
End: 2017-01-30
Attending: SURGERY
Payer: MEDICAID

## 2017-01-30 DIAGNOSIS — K80.20 GALLSTONES: ICD-10-CM

## 2017-01-30 LAB
# FETUSES US: NORMAL
AFP MOM SERPL: 1.14
AFP SERPL-MCNC: 48 NG/ML
AGE - REPORTED: 23.1 YR
GA METHOD: NORMAL
GA: 19 WEEKS
HCG MOM SERPL: 1.36
HCG SERPL-ACNC: NORMAL IU/L
IDDM PATIENT QL: NO
INHIBIN A MOM SERPL: 0.98
INHIBIN A SERPL-MCNC: 147 PG/ML
INTEGRATED SCN PATIENT-IMP: NORMAL
PATHOLOGY STUDY: NORMAL
U ESTRIOL MOM SERPL: 0.89
U ESTRIOL SERPL-MCNC: 1.5 NG/ML

## 2017-02-02 ENCOUNTER — HOSPITAL ENCOUNTER (OUTPATIENT)
Facility: MEDICAL CENTER | Age: 22
End: 2017-02-02
Attending: SURGERY | Admitting: SURGERY
Payer: MEDICAID

## 2017-02-02 VITALS
SYSTOLIC BLOOD PRESSURE: 117 MMHG | TEMPERATURE: 97.7 F | DIASTOLIC BLOOD PRESSURE: 55 MMHG | HEART RATE: 74 BPM | WEIGHT: 183.86 LBS | OXYGEN SATURATION: 97 % | RESPIRATION RATE: 16 BRPM | BODY MASS INDEX: 29.55 KG/M2 | HEIGHT: 66 IN

## 2017-02-02 PROBLEM — K80.20 CHOLELITHIASIS: Status: ACTIVE | Noted: 2017-02-02

## 2017-02-02 PROCEDURE — 502240 HCHG MISC OR SUPPLY RC 0272: Performed by: SURGERY

## 2017-02-02 PROCEDURE — 110382 HCHG SHELL REV 271: Performed by: SURGERY

## 2017-02-02 PROCEDURE — 700111 HCHG RX REV CODE 636 W/ 250 OVERRIDE (IP)

## 2017-02-02 PROCEDURE — 110371 HCHG SHELL REV 272: Performed by: SURGERY

## 2017-02-02 PROCEDURE — 160002 HCHG RECOVERY MINUTES (STAT): Performed by: SURGERY

## 2017-02-02 PROCEDURE — 160009 HCHG ANES TIME/MIN: Performed by: SURGERY

## 2017-02-02 PROCEDURE — 160039 HCHG SURGERY MINUTES - EA ADDL 1 MIN LEVEL 3: Performed by: SURGERY

## 2017-02-02 PROCEDURE — 160036 HCHG PACU - EA ADDL 30 MINS PHASE I: Performed by: SURGERY

## 2017-02-02 PROCEDURE — 700101 HCHG RX REV CODE 250

## 2017-02-02 PROCEDURE — 501586 HCHG TROCAR, THRD SPIKE 5X55: Performed by: SURGERY

## 2017-02-02 PROCEDURE — 160035 HCHG PACU - 1ST 60 MINS PHASE I: Performed by: SURGERY

## 2017-02-02 PROCEDURE — 502571 HCHG PACK, LAP CHOLE: Performed by: SURGERY

## 2017-02-02 PROCEDURE — A6402 STERILE GAUZE <= 16 SQ IN: HCPCS | Performed by: SURGERY

## 2017-02-02 PROCEDURE — A4606 OXYGEN PROBE USED W OXIMETER: HCPCS | Performed by: SURGERY

## 2017-02-02 PROCEDURE — 501838 HCHG SUTURE GENERAL: Performed by: SURGERY

## 2017-02-02 PROCEDURE — 160048 HCHG OR STATISTICAL LEVEL 1-5: Performed by: SURGERY

## 2017-02-02 PROCEDURE — 88304 TISSUE EXAM BY PATHOLOGIST: CPT

## 2017-02-02 PROCEDURE — 500514 HCHG ENDOCLIP: Performed by: SURGERY

## 2017-02-02 PROCEDURE — 700102 HCHG RX REV CODE 250 W/ 637 OVERRIDE(OP)

## 2017-02-02 PROCEDURE — 160028 HCHG SURGERY MINUTES - 1ST 30 MINS LEVEL 3: Performed by: SURGERY

## 2017-02-02 PROCEDURE — 501574 HCHG TROCAR, SMTH CAN&SEAL 5: Performed by: SURGERY

## 2017-02-02 PROCEDURE — A9270 NON-COVERED ITEM OR SERVICE: HCPCS

## 2017-02-02 PROCEDURE — 501577 HCHG TROCAR, STEP 11MM: Performed by: SURGERY

## 2017-02-02 PROCEDURE — 500854 HCHG NEEDLE, INSUFFLATION FOR STEP: Performed by: SURGERY

## 2017-02-02 RX ORDER — ONDANSETRON 2 MG/ML
INJECTION INTRAMUSCULAR; INTRAVENOUS
Status: COMPLETED
Start: 2017-02-02 | End: 2017-02-02

## 2017-02-02 RX ORDER — BUPIVACAINE HYDROCHLORIDE 5 MG/ML
INJECTION, SOLUTION EPIDURAL; INTRACAUDAL
Status: DISCONTINUED
Start: 2017-02-02 | End: 2017-02-02 | Stop reason: HOSPADM

## 2017-02-02 RX ORDER — BUPIVACAINE HYDROCHLORIDE 5 MG/ML
INJECTION, SOLUTION EPIDURAL; INTRACAUDAL
Status: DISCONTINUED | OUTPATIENT
Start: 2017-02-02 | End: 2017-02-02 | Stop reason: HOSPADM

## 2017-02-02 RX ORDER — HYDROCODONE BITARTRATE AND ACETAMINOPHEN 5; 325 MG/1; MG/1
1-2 TABLET ORAL EVERY 4 HOURS PRN
Qty: 30 TAB | Refills: 0 | Status: SHIPPED | OUTPATIENT
Start: 2017-02-02 | End: 2017-06-06

## 2017-02-02 RX ORDER — OXYCODONE HCL 5 MG/5 ML
SOLUTION, ORAL ORAL
Status: COMPLETED
Start: 2017-02-02 | End: 2017-02-02

## 2017-02-02 RX ORDER — ONDANSETRON 4 MG/1
4 TABLET, FILM COATED ORAL EVERY 4 HOURS PRN
Qty: 20 TAB | Refills: 0 | Status: SHIPPED | OUTPATIENT
Start: 2017-02-02 | End: 2017-06-06

## 2017-02-02 RX ORDER — SODIUM CHLORIDE, SODIUM LACTATE, POTASSIUM CHLORIDE, CALCIUM CHLORIDE 600; 310; 30; 20 MG/100ML; MG/100ML; MG/100ML; MG/100ML
1000 INJECTION, SOLUTION INTRAVENOUS
Status: DISCONTINUED | OUTPATIENT
Start: 2017-02-02 | End: 2017-02-02 | Stop reason: HOSPADM

## 2017-02-02 RX ADMIN — OXYCODONE HYDROCHLORIDE 10 MG: 5 SOLUTION ORAL at 14:51

## 2017-02-02 RX ADMIN — ONDANSETRON 4 MG: 2 INJECTION, SOLUTION INTRAMUSCULAR; INTRAVENOUS at 18:01

## 2017-02-02 RX ADMIN — FENTANYL CITRATE 25 MCG: 50 INJECTION, SOLUTION INTRAMUSCULAR; INTRAVENOUS at 14:54

## 2017-02-02 ASSESSMENT — PAIN SCALES - GENERAL
PAINLEVEL_OUTOF10: 3
PAINLEVEL_OUTOF10: 2
PAINLEVEL_OUTOF10: 2
PAINLEVEL_OUTOF10: 3
PAINLEVEL_OUTOF10: 2
PAINLEVEL_OUTOF10: 3
PAINLEVEL_OUTOF10: 3
PAINLEVEL_OUTOF10: 2
PAINLEVEL_OUTOF10: 0
PAINLEVEL_OUTOF10: 3
PAINLEVEL_OUTOF10: 3
PAINLEVEL_OUTOF10: 2
PAINLEVEL_OUTOF10: 5
PAINLEVEL_OUTOF10: 2
PAINLEVEL_OUTOF10: 2

## 2017-02-02 NOTE — PROGRESS NOTES
1455 In to see pt after procedure. CNA at bedside translating. Pt awake, alert, denies UCs, loss of fluid or bleeding per vagina at this time. Pt states no fetal movement at this time. FHT's by doppler 140-150 bpm x 1 min. Educated pt on importance of following up at [facility] as previously instructed. Educated pt on importance of calling [facility] if pt has UCs, loss of fluid or bleeding per vagina. Educated pt that she should be feeling fetal movement later this afternoon as anesthesia wears off. Pt verbalized understanding, states no more questions at this time. Report given to Dr Christianson, no further orders at this time. No need for further monitoring at this time per Dr Christianson.

## 2017-02-02 NOTE — OR NURSING
1444 Received from OR, report from DR. Chen.   X 4 abdomens incisions CDI with Tegaderm dressing.      1454 L and D RN Tangela at bedside to listen to fetal heart tones.  Tangela RN states that heart fetal heart tones are normal,  at bedside.      1451 Pt tolerating sips of water.  Oral pain medication given see mar.   5/10 pain scale score    1454 Iv pain medication given see mar.     1500 Pt  at bedside    1722 Hand off care to Sayra MACHADO

## 2017-02-02 NOTE — IP AVS SNAPSHOT
" After Visit Summary                                                                                                                Name:Merari Mcghee  Medical Record Number:[id number]  CSN: [id number]    YOB: 1995   Age: 21 y.o.  Sex: female  HT:1.676 m (5' 5.98\") WT: 83.4 kg (183 lb 13.8 oz)          Admit Date: 2/2/2017     Discharge Date:   Today's Date: 2/2/2017  Attending Doctor:  Shelton Davis M.D.                  Allergies:  Review of patient's allergies indicates no known allergies.                Discharge Instructions         ACTIVITY: Rest and take it easy for the first 24 hours.  A responsible adult is recommended to remain with you during that time.  It is normal to feel sleepy.  We encourage you to not do anything that requires balance, judgment or coordination.    MILD FLU-LIKE SYMPTOMS ARE NORMAL. YOU MAY EXPERIENCE GENERALIZED MUSCLE ACHES, THROAT IRRITATION, HEADACHE AND/OR SOME NAUSEA.    FOR 24 HOURS DO NOT:  Drive, operate machinery or run household appliances.  Drink beer or alcoholic beverages.   Make important decisions or sign legal documents.    SPECIAL INSTRUCTIONS: Laparoscopic Cholecystectomy D/C instructions:   1. DIET: Upon discharge from the hospital you may resume your normal preoperative diet. Depending on how you are feeling and whether you have nausea or not, you may wish to stay with a bland diet for the first few days. However, you can advance this as quickly as you feel ready.   2. ACTIVITIES: After discharge from the hospital, you may resume full routine activities. However, there should be no heavy lifting (greater than 15 pounds) and no strenuous activities until after your follow-up visit. Otherwise, routine activities of daily living are acceptable.   3. DRIVING: You may drive whenever you are off pain medications and are able to perform the activities needed to drive, i.e. turning, bending, twisting, etc.   4. BATHING: You may get the wound wet at any " time after leaving the hospital. You may shower, but do not submerge in a bath for at least a week. Dressings may come off after 48 hours.   5. BOWEL FUNCTION: A few patients, after this operation, will develop either frequent or loose stools after meals. This usually corrects itself after a few days, to a few weeks. If this occurs, do not worry; it is not unusual and will resolve. Much more common than loose stools, is constipation. The combination of pain medication and decreased activity level can cause constipation in otherwise normal patients. If you feel this is occurring, take a laxative (Milk of Magnesia, Ex-Lax, Senokot, etc.) until the problem has resolved.   6. PAIN MEDICATION: You will be given a prescription for pain medication at discharge. Please take these as directed. It is important to remember not to take medications on an empty stomach as this may cause nausea.   7.CALL IF YOU HAVE: (1) Fevers to more than 1010 F, (2) Unusual chest or leg pain, (3) Drainage or fluid from incision that may be foul smelling, increased tenderness or soreness at the wound or the wound edges are no longer together, redness or swelling at the incision site. Please do not hesitate to call with any other questions.   8. APPOINTMENT: Contact our office at [phone] for a follow-up appointment in 1 to 2 weeks following your procedure. If you have any additional questions, please do not hesitate to call the office and speak to either myself or the physician on call.     Office address: [address] [place] [address], [address] Shelton Davis M.D. [facility] [phone]       DIET: To avoid nausea, slowly advance diet as tolerated, avoiding spicy or greasy foods for the first day.  Add more substantial food to your diet according to your physician's instructions.  Babies can be fed formula or breast milk as soon as they are hungry.  INCREASE FLUIDS AND FIBER TO AVOID CONSTIPATION.    SURGICAL DRESSING/BATHING: *See Above  Instructions**    FOLLOW-UP APPOINTMENT:  A follow-up appointment should be arranged with your doctor, call to schedule.    You should CALL YOUR PHYSICIAN if you develop:  Fever greater than 101 degrees F.  Pain not relieved by medication, or persistent nausea or vomiting.  Excessive bleeding (blood soaking through dressing) or unexpected drainage from the wound.  Extreme redness or swelling around the incision site, drainage of pus or foul smelling drainage.  Inability to urinate or empty your bladder within 8 hours.  Problems with breathing or chest pain.    You should call 911 if you develop problems with breathing or chest pain.  If you are unable to contact your doctor or surgical center, you should go to the nearest emergency room or urgent care center.  Physician's telephone #: Dr. Davis [phone]    If any questions arise, call your doctor.  If your doctor is not available, please feel free to call the Surgical Center at [phone].  The Center is open Monday through Friday from 7AM to 7PM.  You can also call the [employer] HOTLINE open 24 hours/day, 7 days/week and speak to a nurse at [phone], or toll free at [phone].    A registered nurse may call you a few days after your surgery to see how you are doing after your procedure.    MEDICATIONS: Resume taking daily medication.  Take prescribed pain medication with food.  If no medication is prescribed, you may take non-aspirin pain medication if needed.  PAIN MEDICATION CAN BE VERY CONSTIPATING.  Take a stool softener or laxative such as senokot, pericolace, or milk of magnesia if needed.    Prescription given for Norco and Zofran  Last pain medication given at 2:51 PM.  Next does due at 6:51PM    If your physician has prescribed pain medication that includes Acetaminophen (Tylenol), do not take additional Acetaminophen (Tylenol) while taking the prescribed medication.    Depression / Suicide Risk    As you are discharged from this [facility]  facility, it is important to learn how to keep safe from harming yourself.    Recognize the warning signs:  · Abrupt changes in personality, positive or negative- including increase in energy   · Giving away possessions  · Change in eating patterns- significant weight changes-  positive or negative  · Change in sleeping patterns- unable to sleep or sleeping all the time   · Unwillingness or inability to communicate  · Depression  · Unusual sadness, discouragement and loneliness  · Talk of wanting to die  · Neglect of personal appearance   · Rebelliousness- reckless behavior  · Withdrawal from people/activities they love  · Confusion- inability to concentrate     If you or a loved one observes any of these behaviors or has concerns about self-harm, here's what you can do:  · Talk about it- your feelings and reasons for harming yourself  · Remove any means that you might use to hurt yourself (examples: pills, rope, extension cords, firearm)  · Get professional help from the community (Mental Health, Substance Abuse, psychological counseling)  · Do not be alone:Call your Safe Contact- someone whom you trust who will be there for you.  · Call your local CRISIS HOTLINE 548-1655 or 444-345-8740  · Call your local Children's Mobile Crisis Response Team [place] (286) 834-1134 or www.[employer]  · Call the toll free National Suicide Prevention Hotlines   · National Suicide Prevention Lifeline 473-736-REIQ (2830)  National Hope Line Network 800-SUICIDE (065-8231)Instrucciones Para [place]  (Home Care Instructions)    ACTIVIDAD: Descanse y tome todo con mucha calma las primeras 24 horas después de sawant cirugía.  Ashley persona adulta responsable debe permanecer con usted sandip mio periodo de tiempo.  Es normal sentirse sonoliento o sonolienta sandip esas primeras horas.  Le recomendamos que no aleksey nada que requiera equilibrio, shonda decisiones a mucha coordinación de sawant parte.    NO ALEKSEY ESTO PURANTE LAS PRIMERAS 24  HORAS:   Manejar o conducir algún vehiculo, operar maquinarias o utilizar electrodomesticos.   Beber cerveza o algún otro tipo de bebida alcohólica.   Iza decisiones importantes o firmar documentos legales.    INSTRUCCIONES ESPECIALES: Laparoscopic Cholecystectomy D/C instructions:   1. DIET: Upon discharge from the hospital you may resume your normal preoperative diet. Depending on how you are feeling and whether you have nausea or not, you may wish to stay with a bland diet for the first few days. However, you can advance this as quickly as you feel ready.   2. ACTIVITIES: After discharge from the hospital, you may resume full routine activities. However, there should be no heavy lifting (greater than 15 pounds) and no strenuous activities until after your follow-up visit. Otherwise, routine activities of daily living are acceptable.   3. DRIVING: You may drive whenever you are off pain medications and are able to perform the activities needed to drive, i.e. turning, bending, twisting, etc.   4. BATHING: You may get the wound wet at any time after leaving the hospital. You may shower, but do not submerge in a bath for at least a week. Dressings may come off after 48 hours.   5. BOWEL FUNCTION: A few patients, after this operation, will develop either frequent or loose stools after meals. This usually corrects itself after a few days, to a few weeks. If this occurs, do not worry; it is not unusual and will resolve. Much more common than loose stools, is constipation. The combination of pain medication and decreased activity level can cause constipation in otherwise normal patients. If you feel this is occurring, take a laxative (Milk of Magnesia, Ex-Lax, Senokot, etc.) until the problem has resolved.   6. PAIN MEDICATION: You will be given a prescription for pain medication at discharge. Please take these as directed. It is important to remember not to take medications on an empty stomach as this may cause  nausea.   7.CALL IF YOU HAVE: (1) Fevers to more than 1010 F, (2) Unusual chest or leg pain, (3) Drainage or fluid from incision that may be foul smelling, increased tenderness or soreness at the wound or the wound edges are no longer together, redness or swelling at the incision site. Please do not hesitate to call with any other questions.   8. APPOINTMENT: Contact our office at [phone] for a follow-up appointment in 1 to 2 weeks following your procedure. If you have any additional questions, please do not hesitate to call the office and speak to either myself or the physician on call.     Office address: [address] [place], [place] [address]e [facility], [address] Shelton Davis M.D. [facility] [phone]       DIETA: Para evitar las nauseas, prosiga despacito con sawant dieta a medida que pueda ir tolerándola mejor, evite comidas muy condimentadas o grasosas sandip mio primer día.  Vaya agregando comidas más substanciadas a sawant dieta a medida que asi lo indique sawant médica.  Los bebés pueden beber leche preparada o formula, ásl abigail también leche del seno de la madre a medida que vayan teniendo hambre.  SIGA AGREGANDO LIQUIDOS Y COMIDAS CON FIBRA PARA EVITAR ESTREÑIMIENTO.    ABIGAIL BAÑARSE Y CAMBIAR LOS VENDAJES DE LA CIRUGIA:  See Above Instrutions    MEDICAMENTOS/MEDICINAS:  Vuelva a shonda gayle medicamentos diarios.  Bogard los medicamentos que se le prescribe con un poco de comida.  Si no le prescribe ningún tipo de medicamento, entonces puede shonda medicinas para el dolor que no contienen aspirina, si las necesita.  LAS MEDICINAS PARA EL DOLOR PUEDEN ESTREÑIRLE MUCHO.  Bogard un suavizante para el excremento o materia fecal (stool softener) o un laxativo abigail por ejemplo: senokot, pericolase, o leche de magnesia, si lo necesita.    La prescripción la administro [place] and Zofran.  La ultima sosis de medicina para el dolor fue administrada 2:51 PM.     Se debe hacer manish consulta medica con el doctor en , call to scheduale [place] para  hacer la john.    Usted debe LIAMAR A SAWANT MEDICO si tiene los siguientes síntomas:   -   Ashley fiebre más paulette de 101 grados Fahrenheit.   -   Un dolor incesante aún con los medicamentos, o nauseas y vómito persistente.   -   Un sangrado excesivo (juan francisco que traspasa los vendajes o gasas) o algúln tipo de drenaje inesperado que proviene de la henda.     -   Un color diaz exagerado o hinchazón alrededor del área en donde se le hizo incisión o arleth, o un drenaje de pus o con olor azeb proveniente de la henda.   -    La inhabilidad de orinar o vaciar sawant vejiga en 8 horas.   -    Problemas con a respiración o lien en el pecho.    Usted debe llamar al 911 si se presentan problemas con el dolor al respirar o el pecho.  Si no se puede ponnoer en comunicación con un medica o con el centro de cirugía, usted debe ir a la estación de emergencia (emergency room) más cercana o a un centro de atención de urgencia (urgent care center).  El teléfono del medico es: [phone]    LOS SÍNTOMAS DE UN LEVE RESFRIO SON MUY NORMALES.  ADEMÁS USTED PUEDE LLEGAR A SENTIR LIEN GENERALES DE MÚSCULOS, IRRITACIÓN EN LA GARGANTA, LIEN DE THA Y/O UN POCO DE NAUSEAS.    Sie tiene alguna pregunta, llame a sawant médico.  Si sawant médico no se encuentra disponible, por favor llame al Centro de Cirugía at [phone].  el Centro está abierto de Lunes a Viernes desde las 7:00 de la manana hasta las 7:00 de la noche.  Usted también puede llamar al CENTRO DE LLAMADAS SOBRE LA PAUL o HEALTH HOTLINE.  Claudia está abierto viente y cuatro horas por amadou, siete bustamante por semana, allí podrá hablar con ashley enfermera.  Llame al [phone], o al abimaelo papito 4 [phone].    Mi firma a continuación indica que he recibido y entiendco estas instrucciones acera de los cuidados en la casa (Home Care Instructions)    · Onel recibirá ashley encuesta en la correspondencia en las siguientes semanas y le pedimos que por favor tome un momento para completar john  encuesta y regresaría a hosotros.  Nuestro objetivó es brindarle un cuidado muy tabares y par lo tanto apreciamos gayle coméntanos.  Muchas maria del rosario por kathi escogido el Centro de Cirugía de [facility].       Medication List      START taking these medications        Instructions    hydrocodone-acetaminophen 5-325 MG Tabs per tablet   Commonly known as:  NORCO    Take 1-2 Tabs by mouth every four hours as needed.   Dose:  1-2 Tab       ondansetron 4 MG Tabs tablet   Commonly known as:  ZOFRAN    Take 1 Tab by mouth every four hours as needed for Nausea/Vomiting.   Dose:  4 mg         CONTINUE taking these medications        Instructions    ondansetron 4 MG Tbdp   Commonly known as:  ZOFRAN ODT    Take 1 Tab by mouth every 8 hours as needed for Nausea/Vomiting.   Dose:  4 mg       PRENATAL VITAMINS PO    Take 1 Tab by mouth every day.   Dose:  1 Tab               Medication Information     Above and/or attached are the medications your physician expects you to take upon discharge. Review all of your home medications and newly ordered medications with your doctor and/or pharmacist. Follow medication instructions as directed by your doctor and/or pharmacist. Please keep your medication list with you and share with your physician. Update the information when medications are discontinued, doses are changed, or new medications (including over-the-counter products) are added; and carry medication information at all times in the event of emergency situations.        Resources     Quit Smoking / Tobacco Use:    I understand the use of any tobacco products increases my chance of suffering from future heart disease or stroke and could cause other illnesses which may shorten my life. Quitting the use of tobacco products is the single most important thing I can do to improve my health. For further information on smoking / tobacco cessation call a Toll Free Quit Line at 1-995.109.1945 (*National Cancer  [place]) or 1-433.614.4535 (American Lung Association) or you can access the web based program at www.lungusa.org.    [place] Tobacco Users Help Line:  (298) 635-6150       Toll Free: 1-748.918.3926  Quit Tobacco Program [facility] Management Services (106)787-2932    Crisis Hotline:    [place] Crisis Hotline:  2-392-NTJPVFL or 1-736.832.9998    [place] Crisis Hotline:    1-214.173.4665 or 292-779-1926    Discharge Survey:   [facility] you [facility]. We hope we did everything we could to make your hospital stay a pleasant one. You may be receiving a survey and we would appreciate your time and participation in answering the questions. Your input is very valuable to us in our efforts to improve our service to our patients and their families.            Signatures     My signature on this form indicates that:    1. I acknowledge receipt and understanding of these Home Care Instruction.  2. My questions regarding this information have been answered to my satisfaction.  3. I have formulated a plan with my discharge nurse to obtain my prescribed medications for home.    __________________________________      __________________________________                   Patient Signature                                 Guardian/Responsible Adult Signature      __________________________________                 __________       ________                       Nurse Signature                                               Date                 Time

## 2017-02-02 NOTE — PROGRESS NOTES
1220 In to see pt prior to procedure. Family at bedside, pt's sister interpretting. Pt states + FM, denies loss of fluid, denies UCs at this time. FHTs by doppler 150-160 bpm with audible fetal movement. Family updated on +FHTs prior to surgery, will repeat after surgery. Educated family on possible decreased fetal movement after procedure due to anesthesia. Family verbalized understanding.

## 2017-02-02 NOTE — IP AVS SNAPSHOT
2/2/2017          Merari Mcghee  [address]   [address]   [place] [address]    Dear Merari:    [facility] wants to ensure your discharge home is safe and you or your loved ones have had all your questions answered regarding your care after you leave the hospital.    You may receive a telephone call within two days of your discharge.  This call is to make certain you understand your discharge instructions as well as ensure we provided you with the best care possible during your stay with us.     The call will only last approximately 3-5 minutes and will be done by a nurse.    Once again, we want to ensure your discharge home is safe and that you have a clear understanding of any next steps in your care.  If you have any questions or concerns, please do not hesitate to contact us, we are here for you.  Thank you for choosing [facility] for your healthcare needs.    Sincerely,    Trey Rose    [facility]

## 2017-02-02 NOTE — OR NURSING
1200 Spoke with L&D RN to confirm pt.'s arrival on unit and need to monitor fetal heart tones. Pt speaks Armenian, pt.'s Sister translating.

## 2017-02-03 NOTE — OP REPORT
DATE OF SERVICE:  02/02/2017    PREPROCEDURE DIAGNOSIS:  Symptomatic cholelithiasis.    POSTPROCEDURE DIAGNOSES:  1.  Cholecystitis.  2.  Intrauterine pregnancy.    PROCEDURE PERFORMED:  Laparoscopic cholecystectomy.    SURGEON:  Shelton Davis MD    FIRST ASSISTANT:  TATIANNA Morocho    ANESTHESIA:  General.    SPECIMENS:  Gallbladder.    PROCEDURE IN DETAIL:  Patient was taken to the operating suite, placed in   supine position.  After adequate anesthesia, the patient's abdomen was prepped   and draped in sterile fashion.  A 0.5% Marcaine was infiltrated into the   subcutaneous tissue of left subcostal space.  Small incision was made and a   Veress needle was inserted into the peritoneal cavity.  CO2 was insufflated to   a pressure of 15 mmHg and a 5-mm trocar was inserted.  Two additional trocars   were placed in the right subcostal region under direct vision and an 11-mm   trocar was placed in the subxiphoid area and another 5 mm trocar was placed in   the infraumbilical region under direct vision to make sure not to interact   with the uterus.  Gallbladder was identified.  It was noted to be tense with   bile and inflamed.  The gallbladder was aspirated of its contents and was   retracted cephalad.  The infundibulum of the gallbladder was dissected from   the surrounding tissue and isolated.  The cystic duct was clearly isolated,   ligated between clips.  Cystic artery in similar fashion was ligated between   clips and the gallbladder was resected from the gallbladder bed using   electrocautery.  Gallbladder was then retrieved through the subxiphoid port   using an EndoCatch bag.  Peritoneal cavity was irrigated, aspirated dry.    Hemostasis was assured.  All ports were removed under direct vision.  The   subxiphoid port site was closed using a 0 Vicryl and 4-0 Monocryl was used for   the skin.  Sterile dressings were applied.       ____________________________________     MD GILLIAN PAZ / HERNANDO    DD:   02/02/2017 16:28:12  DT:  02/02/2017 20:55:04    D#:  [id number]  Job#:  [id number]

## 2017-02-03 NOTE — DISCHARGE INSTRUCTIONS
ACTIVITY: Rest and take it easy for the first 24 hours.  A responsible adult is recommended to remain with you during that time.  It is normal to feel sleepy.  We encourage you to not do anything that requires balance, judgment or coordination.    MILD FLU-LIKE SYMPTOMS ARE NORMAL. YOU MAY EXPERIENCE GENERALIZED MUSCLE ACHES, THROAT IRRITATION, HEADACHE AND/OR SOME NAUSEA.    FOR 24 HOURS DO NOT:  Drive, operate machinery or run household appliances.  Drink beer or alcoholic beverages.   Make important decisions or sign legal documents.    SPECIAL INSTRUCTIONS: Laparoscopic Cholecystectomy D/C instructions:   1. DIET: Upon discharge from the hospital you may resume your normal preoperative diet. Depending on how you are feeling and whether you have nausea or not, you may wish to stay with a bland diet for the first few days. However, you can advance this as quickly as you feel ready.   2. ACTIVITIES: After discharge from the hospital, you may resume full routine activities. However, there should be no heavy lifting (greater than 15 pounds) and no strenuous activities until after your follow-up visit. Otherwise, routine activities of daily living are acceptable.   3. DRIVING: You may drive whenever you are off pain medications and are able to perform the activities needed to drive, i.e. turning, bending, twisting, etc.   4. BATHING: You may get the wound wet at any time after leaving the hospital. You may shower, but do not submerge in a bath for at least a week. Dressings may come off after 48 hours.   5. BOWEL FUNCTION: A few patients, after this operation, will develop either frequent or loose stools after meals. This usually corrects itself after a few days, to a few weeks. If this occurs, do not worry; it is not unusual and will resolve. Much more common than loose stools, is constipation. The combination of pain medication and decreased activity level can cause constipation in otherwise normal patients. If  you feel this is occurring, take a laxative (Milk of Magnesia, Ex-Lax, Senokot, etc.) until the problem has resolved.   6. PAIN MEDICATION: You will be given a prescription for pain medication at discharge. Please take these as directed. It is important to remember not to take medications on an empty stomach as this may cause nausea.   7.CALL IF YOU HAVE: (1) Fevers to more than 1010 F, (2) Unusual chest or leg pain, (3) Drainage or fluid from incision that may be foul smelling, increased tenderness or soreness at the wound or the wound edges are no longer together, redness or swelling at the incision site. Please do not hesitate to call with any other questions.   8. APPOINTMENT: Contact our office at [phone] for a follow-up appointment in 1 to 2 weeks following your procedure. If you have any additional questions, please do not hesitate to call the office and speak to either myself or the physician on call.     Office address: [address] [place] [address], [address] Shelton Davis M.D. [facility] [phone]       DIET: To avoid nausea, slowly advance diet as tolerated, avoiding spicy or greasy foods for the first day.  Add more substantial food to your diet according to your physician's instructions.  Babies can be fed formula or breast milk as soon as they are hungry.  INCREASE FLUIDS AND FIBER TO AVOID CONSTIPATION.    SURGICAL DRESSING/BATHING: *See Above Instructions**    FOLLOW-UP APPOINTMENT:  A follow-up appointment should be arranged with your doctor, call to schedule.    You should CALL YOUR PHYSICIAN if you develop:  Fever greater than 101 degrees F.  Pain not relieved by medication, or persistent nausea or vomiting.  Excessive bleeding (blood soaking through dressing) or unexpected drainage from the wound.  Extreme redness or swelling around the incision site, drainage of pus or foul smelling drainage.  Inability to urinate or empty your bladder within 8 hours.  Problems with breathing or chest  pain.    You should call 911 if you develop problems with breathing or chest pain.  If you are unable to contact your doctor or surgical center, you should go to the nearest emergency room or urgent care center.  Physician's telephone #: Dr. Davis [phone]    If any questions arise, call your doctor.  If your doctor is not available, please feel free to call the Surgical Center at [phone].  The Center is open Monday through Friday from 7AM to 7PM.  You can also call the HEALTH HOTLINE open 24 hours/day, 7 days/week and speak to a nurse at [phone], or toll free at [phone].    A registered nurse may call you a few days after your surgery to see how you are doing after your procedure.    MEDICATIONS: Resume taking daily medication.  Take prescribed pain medication with food.  If no medication is prescribed, you may take non-aspirin pain medication if needed.  PAIN MEDICATION CAN BE VERY CONSTIPATING.  Take a stool softener or laxative such as senokot, pericolace, or milk of magnesia if needed.    Prescription given for Norco and Zofran  Last pain medication given at 2:51 PM.  Next does due at 6:51PM    If your physician has prescribed pain medication that includes Acetaminophen (Tylenol), do not take additional Acetaminophen (Tylenol) while taking the prescribed medication.    Depression / Suicide Risk    As you are discharged from this [facility] facility, it is important to learn how to keep safe from harming yourself.    Recognize the warning signs:  · Abrupt changes in personality, positive or negative- including increase in energy   · Giving away possessions  · Change in eating patterns- significant weight changes-  positive or negative  · Change in sleeping patterns- unable to sleep or sleeping all the time   · Unwillingness or inability to communicate  · Depression  · Unusual sadness, discouragement and loneliness  · Talk of wanting to die  · Neglect of personal  appearance   · Rebelliousness- reckless behavior  · Withdrawal from people/activities they love  · Confusion- inability to concentrate     If you or a loved one observes any of these behaviors or has concerns about self-harm, here's what you can do:  · Talk about it- your feelings and reasons for harming yourself  · Remove any means that you might use to hurt yourself (examples: pills, rope, extension cords, firearm)  · Get professional help from the community (Mental Health, Substance Abuse, psychological counseling)  · Do not be alone:Call your Safe Contact- someone whom you trust who will be there for you.  · Call your local CRISIS HOTLINE 696-8279 or 552-147-8890  · Call your local Children's Mobile Crisis Response Team [place] (251) 662-6991 or www.VEASYT  · Call the toll free National Suicide Prevention Hotlines   · National Suicide Prevention Lifeline 427-968-QNFO (6559)  [facility] 800-SUICIDE (140-5713)Instrucciones Para [place]  (Home Care Instructions)    ACTIVIDAD: Descanse y tome todo con mucha calma las primeras 24 horas después de sawant cirugía.  Ashlye persona adulta responsable debe permanecer con usted sandip mio periodo de tiempo.  Es normal sentirse sonoliento o sonolienta sandip esas primeras horas.  Le recomendamos que no aleksey nada que requiera equilibrio, iza decisiones a mucha coordinación de sawant parte.    NO ALEKSEY ESTO PURANTE LAS PRIMERAS 24 HORAS:   Manejar o conducir algún vehiculo, operar maquinarias o utilizar electrodomesticos.   Beber cerveza o algún otro tipo de bebida alcohólica.   Iza decisiones importantes o firmar documentos legales.    INSTRUCCIONES ESPECIALES: Laparoscopic Cholecystectomy D/C instructions:   1. DIET: Upon discharge from the hospital you may resume your normal preoperative diet. Depending on how you are feeling and whether you have nausea or not, you may wish to stay with a bland diet for the first few days. However, you can advance  this as quickly as you feel ready.   2. ACTIVITIES: After discharge from the hospital, you may resume full routine activities. However, there should be no heavy lifting (greater than 15 pounds) and no strenuous activities until after your follow-up visit. Otherwise, routine activities of daily living are acceptable.   3. DRIVING: You may drive whenever you are off pain medications and are able to perform the activities needed to drive, i.e. turning, bending, twisting, etc.   4. BATHING: You may get the wound wet at any time after leaving the hospital. You may shower, but do not submerge in a bath for at least a week. Dressings may come off after 48 hours.   5. BOWEL FUNCTION: A few patients, after this operation, will develop either frequent or loose stools after meals. This usually corrects itself after a few days, to a few weeks. If this occurs, do not worry; it is not unusual and will resolve. Much more common than loose stools, is constipation. The combination of pain medication and decreased activity level can cause constipation in otherwise normal patients. If you feel this is occurring, take a laxative (Milk of Magnesia, Ex-Lax, Senokot, etc.) until the problem has resolved.   6. PAIN MEDICATION: You will be given a prescription for pain medication at discharge. Please take these as directed. It is important to remember not to take medications on an empty stomach as this may cause nausea.   7.CALL IF YOU HAVE: (1) Fevers to more than 1010 F, (2) Unusual chest or leg pain, (3) Drainage or fluid from incision that may be foul smelling, increased tenderness or soreness at the wound or the wound edges are no longer together, redness or swelling at the incision site. Please do not hesitate to call with any other questions.   8. APPOINTMENT: Contact our office at [phone] for a follow-up appointment in 1 to 2 weeks following your procedure. If you have any additional questions, please do not hesitate to call  the office and speak to either myself or the physician on call.     Office address: [address], [address] Shelton Davis M.D. [facility] [phone]       DIETA: Para evitar las nauseas, prosiga despacito con sawant dieta a medida que pueda ir tolerándola mejor, evite comidas muy condimentadas o grasosas sandip mio primer día.  Vaya agregando comidas más substanciadas a sawant dieta a medida que asi lo indique sawant médica.  Los bebés pueden beber leche preparada o formula, ásl abigail también leche del seno de la madre a medida que vayan teniendo hambre.  SIGA AGREGANDO LIQUIDOS Y COMIDAS CON FIBRA PARA EVITAR ESTREÑIMIENTO.    ABIGAIL BAÑARSE Y CAMBIAR LOS VENDAJES DE LA CIRUGIA:  See Above Instrutions    MEDICAMENTOS/MEDICINAS:  Vuelva a shonda gayle medicamentos diarios.  [place] los medicamentos que se le prescribe con un poco de comida.  Si no le prescribe ningún tipo de medicamento, entonces puede shonda medicinas para el dolor que no contienen aspirina, si las necesita.  LAS MEDICINAS PARA EL DOLOR PUEDEN ESTREÑIRLE MUCHO.  [place] un suavizante para el excremento o materia fecal (stool softener) o un laxativo abigail por ejemplo: senokot, pericolase, o leche de magnesia, si lo necesita.    La prescripción la administro [place] and Zofran.  La ultima sosis de medicina para el dolor fue administrada 2:51 PM.     Se debe hacer manish consulta medica con el doctor en , call to scheduale [place] para hacer la john.    Usted debe LIAMAR A SAWANT MEDICO si tiene los siguientes síntomas:   -   Manish fiebre más paulette de 101 grados Fahrenheit.   -   Un dolor incesante aún con los medicamentos, o nauseas y vómito persistente.   -   Un sangrado excesivo (juan francisco que traspasa los vendajes o gasas) o algúln tipo de drenaje inesperado que proviene de la henda.     -   Un color diaz exagerado o hinchazón alrededor del área en donde se le hizo incisión o arleth, o un drenaje de pus o con olor azeb proveniente de la henda.   -    La inhabilidad de  orinar o vaciar sawant vejiga en 8 horas.   -    Problemas con a respiración o lien en el pecho.    Usted debe llamar al 911 si se presentan problemas con el dolor al respirar o el pecho.  Si no se puede ponnoer en comunicación con un medica o con el centro de cirugía, usted debe ir a la estación de emergencia (emergency room) más cercana o a un centro de atención de urgencia (urgent care center).  El teléfono del medico es: [phone]    LOS SÍNTOMAS DE UN LEVE RESFRIO SON MUY NORMALES.  ADEMÁS USTED PUEDE LLEGAR A SENTIR LIEN GENERALES DE MÚSCULOS, IRRITACIÓN EN LA GARGANTA, LIEN DE THA Y/O UN POCO DE NAUSEAS.    Sie tiene alguna pregunta, llame a sawant médico.  Si sawant médico no se encuentra disponible, por favor llame al [facility] de Cirugía at [phone].  el Centro está abierto de Lunes a Viernes desde las 7:00 de la manana hasta las 7:00 de la noche.  Onel también puede llamar al [facility] DE LLAMADAS SOBRE LA PAUL o HEALTH HOTLINE.  Claudia está abierto viente y cuatro horas por amadou, siete bustamante por semana, allí podrá hablar con manish enfermera.  Llame al [phone], o al número papito [phone].    Mi firma a continuación indica que he recibido y entiendco estas instrucciones acera de los cuidados en la casa (Home Care Instructions)    · Onel recibirá manish encuesta en la correspondencia en las siguientes semanas y le pedimos que por favor tome un momento para completar john encuesta y regresaría a hosotros.  Nuestro objetivó es brindarle un cuidado muy tabares y par lo tanto apreciamos agyle coméntanos.  Muchas maria del rosario por kathi escogido el Centro de Cirugía de [facility].

## 2017-02-03 NOTE — OR NURSING
1722 Report from Richelle MACHADO.  Pt admits to 2/10 pain, denies nausea, resting comfortably at this time.  Pt not quite ready to get up yet, instructed we can wait to try.  at bedside.   1755 Discharge instructions reviewed with patient and , answered all questions and concerns.  1800 Pt feels ready get up and change clothes. Pt felt a wave of nausea, medicated with IV zofran, cool washcloth provided.  Waiting for nausea to pass prior to getting up.   1810 Nausea resolved.  Pt assisted to bathroom to change clothes and attempt to void.   1820 Pt able to void successfully, but feels a little lightheaded.  Assisted back to chair, given apple juice and crackers.  1830 Pt beginning to feel better, resting at this time.   1900 Pt feels ready to go home, meets discharge criteria.  PIV removed, pt tolerated well.    1910 Pt left via wheelchair to d/c home.

## 2017-02-09 ENCOUNTER — APPOINTMENT (OUTPATIENT)
Dept: RADIOLOGY | Facility: IMAGING CENTER | Age: 22
End: 2017-02-09
Attending: NURSE PRACTITIONER
Payer: MEDICAID

## 2017-02-09 ENCOUNTER — DATING (OUTPATIENT)
Dept: OBGYN | Facility: CLINIC | Age: 22
End: 2017-02-09

## 2017-02-09 DIAGNOSIS — Z34.01 ENCOUNTER FOR SUPERVISION OF NORMAL FIRST PREGNANCY IN FIRST TRIMESTER: ICD-10-CM

## 2017-02-09 PROCEDURE — 76805 OB US >/= 14 WKS SNGL FETUS: CPT | Mod: 26 | Performed by: OBSTETRICS & GYNECOLOGY

## 2017-02-14 ENCOUNTER — ROUTINE PRENATAL (OUTPATIENT)
Dept: OBGYN | Facility: CLINIC | Age: 22
End: 2017-02-14
Payer: MEDICAID

## 2017-02-14 VITALS — WEIGHT: 181 LBS | SYSTOLIC BLOOD PRESSURE: 98 MMHG | DIASTOLIC BLOOD PRESSURE: 64 MMHG | BODY MASS INDEX: 29.23 KG/M2

## 2017-02-14 DIAGNOSIS — K80.20 GALLSTONES: ICD-10-CM

## 2017-02-14 DIAGNOSIS — Z34.82 ENCOUNTER FOR SUPERVISION OF NORMAL PREGNANCY IN MULTIGRAVIDA IN SECOND TRIMESTER: ICD-10-CM

## 2017-02-14 PROCEDURE — 90040 PR PRENATAL FOLLOW UP: CPT | Performed by: NURSE PRACTITIONER

## 2017-02-14 NOTE — PROGRESS NOTES
Pt here today for OB follow up  Reports +FM  Denies any compalints  WT:181lb  BP: 98/64  Good # [phone]

## 2017-02-14 NOTE — PROGRESS NOTES
S:  Pt is  at 21w4d here for routine OB follow up.  Had lap choly on 22 w no problems. No report of pain or tenderness at incision sites.  Reports good FM.  Denies VB, LOF, RUCs, or vaginal DC.     O:  Please see above vitals.        FHTs: 150        Fundal ht: 21 cm.        AFP: normal -- reviewed w pt.    Complete OB US  2017 1:26 PM    HISTORY/REASON FOR EXAM:  Evaluate fetal anatomy, alpha-fetoprotein within normal limits    TECHNIQUE/EXAM DESCRIPTION: OB complete ultrasound.    COMPARISON:  None    FINDINGS:  Fetal Lie:  Vertex  LMP:  2016  Clinical GREG by LMP:  2017    Placenta (Location):  Posterior  Placenta Previa: No  Placental Grade: I    Amniotic Fluid Volume:  MELCHOR = 15.57 cm    Fetal Heart Rate:  141 bpm    Cervical Length:  3.71 cm transabdominal    No maternal adnexal mass is identified.    Umbilical Artery S/D Ratio(s):  Not applicable    Fetal Anatomy  (Seen or Not Seen)  Lateral Ventricles     Seen  Cisterna Magna        Seen  Cerebellum              Seen  CSP             Seen  Orbits             Seen  Face/Lips                Seen  Cord Insertion         Seen  Placental CI         Seen  4 Chamber Heart     Seen  LVOT               Seen  RVOT              Seen  Stomach       Seen  Kidneys                   Seen  Urinary Bladder      Seen  Spine                       Seen  3 Vessel Cord          Seen  Both Upper Extremities    Seen  Both Lower Extremities    Seen  Diaphragm             Seen  Movement       Seen  Gender:  Likely female    Fetal Biometry  BPD    5.15 cm, 21 weeks, 4 days  HC    18.74 cm, 21 weeks  AC    16.66 cm, 21 weeks, 5 days  Femur Length    3.43 cm, 20 weeks, 6 days  Humerus Length    3.18 cm, 20 weeks, 4 days  Cerebellum Diameter   2.28 cm    EGA by this US:  21 weeks, 1 day  GREG by this US: 2017  GREG by 1st US:  2017 - ER    Estimated Fetal Weight:  412 grams    Comments:         Impression        1.  Single intrauterine pregnancy of an  estimated gestational age of 21 weeks, 1 day with an estimated date of delivery of 6/21/2017.  2.  Fetal survey within normal limits.         A:  IUP 21w4d  Patient Active Problem List    Diagnosis Date Noted   • Cholelithiasis 02/02/2017   • Encounter for supervision of normal pregnancy in multigravida in second trimester 01/24/2017   • Gallstones, scheduled for surgery on 2/2/2017 01/24/2017        P:  1.  Reviewed labs, ultrasound w pt.          2.  Questions answered.          3.  Encouraged adequate water intake        4.  F/u 4 wks.

## 2017-02-14 NOTE — PATIENT INSTRUCTIONS
1.  Reviewed labs, ultrasound w pt.          2.  Questions answered.          3.  Encouraged adequate water intake        4.  F/u 4 wks.

## 2017-03-17 ENCOUNTER — ROUTINE PRENATAL (OUTPATIENT)
Dept: OBGYN | Facility: CLINIC | Age: 22
End: 2017-03-17
Payer: MEDICAID

## 2017-03-17 VITALS — BODY MASS INDEX: 30.2 KG/M2 | DIASTOLIC BLOOD PRESSURE: 68 MMHG | SYSTOLIC BLOOD PRESSURE: 106 MMHG | WEIGHT: 187 LBS

## 2017-03-17 DIAGNOSIS — K80.20 GALLSTONES: ICD-10-CM

## 2017-03-17 DIAGNOSIS — Z34.82 ENCOUNTER FOR SUPERVISION OF NORMAL PREGNANCY IN MULTIGRAVIDA IN SECOND TRIMESTER: ICD-10-CM

## 2017-03-17 PROCEDURE — 90040 PR PRENATAL FOLLOW UP: CPT | Performed by: NURSE PRACTITIONER

## 2017-03-17 NOTE — PROGRESS NOTES
Pt here today for OB follow up  Reports +FM  Denies any complaints today  Labs ordered  WT:187lb  BP:106/68  Good #  [phone]

## 2017-03-17 NOTE — MR AVS SNAPSHOT
Merari Mcghee   3/17/2017 1:00 PM   Routine Prenatal   MRN: [id number]    [facility]   Dept Phone:  [phone]    Description:  Female : 1995   Provider:  ELSY Alvarenga           Allergies as of 3/17/2017     No Known Allergies      You were diagnosed with     Encounter for supervision of normal pregnancy in multigravida in second trimester   [[id number]]       Gallstones   [[id number]]         Vital Signs     Blood Pressure Weight Last Menstrual Period Smoking Status          106/68 mmHg 84.823 kg (187 lb) 2016 (Exact Date) Never Smoker         Basic Information     Date Of Birth Sex Race Ethnicity Preferred Language    1995 Female White  Origin (O[place]i,Lao,Maldivian,Mirza, etc) [place]i      Problem List              ICD-10-CM Priority Class Noted - Resolved    Encounter for supervision of normal pregnancy in multigravida in second trimester Z34.82   2017 - Present    Gallstones, scheduled for surgery on 2017 K80.20   2017 - Present    Cholelithiasis K80.20   2017 - Present      Health Maintenance        Date Due Completion Dates    IMM HEP B VACCINE (1 of 3 - Primary Series) 1995 ---    IMM HEP A VACCINE (1 of 2 - Standard Series) 4/15/1995 ---    IMM HPV VACCINE (1 of 3 - Female 3 Dose Series) 4/15/2005 ---    IMM VARICELLA (CHICKENPOX) VACCINE (1 of 2 - 2 Dose Adolescent Series) 4/15/2007 ---    IMM DTaP/Tdap/Td Vaccine (1 - Tdap) 4/15/2013 ---    IMM INFLUENZA (1) 2016 ---    PAP SMEAR 2020            Current Immunizations     No immunizations on file.      Below and/or attached are the medications your provider expects you to take. Review all of your home medications and newly ordered medications with your provider and/or pharmacist. Follow medication instructions as directed by your provider and/or pharmacist. Please keep your medication list with you and share with your provider. Update the  information when medications are discontinued, doses are changed, or new medications (including over-the-counter products) are added; and carry medication information at all times in the event of emergency situations     Allergies:  No Known Allergies          Medications  Valid as of: March 17, 2017 -  1:15 PM    Generic Name Brand Name Tablet Size Instructions for use    Hydrocodone-Acetaminophen (Tab) NORCO 5-325 MG Take 1-2 Tabs by mouth every four hours as needed.        Ondansetron (TABLET DISPERSIBLE) ZOFRAN ODT 4 MG Take 1 Tab by mouth every 8 hours as needed for Nausea/Vomiting.        Ondansetron HCl (Tab) ZOFRAN 4 MG Take 1 Tab by mouth every four hours as needed for Nausea/Vomiting.        Prenatal Multivit-Min-Fe-FA   Take 1 Tab by mouth every day.        .                 Medicines prescribed today were sent to:     [facility] [address] [place] [address], [place] - [address] AT [facility] [employer]    [address] [place] [address]    Phone: [phone] Fax: [phone]    Open 24 Hours?: No      Medication refill instructions:       If your prescription bottle indicates you have medication refills left, it is not necessary to call your provider’s office. Please contact your pharmacy and they will refill your medication.    If your prescription bottle indicates you do not have any refills left, you may request refills at any time through one of the following ways: The online [employer] system (except Urgent Care), by calling your provider’s office, or by asking your pharmacy to contact your provider’s office with a refill request. Medication refills are processed only during regular business hours and may not be available until the next business day. Your provider may request additional information or to have a follow-up visit with you prior to refilling your medication.   *Please Note: Medication refills are assigned a new Rx number when refilled electronically. Your pharmacy may indicate that  no refills were authorized even though a new prescription for the same medication is available at the pharmacy. Please request the medicine by name with the pharmacy before contacting your provider for a refill.        Your To Do List     Future Labs/Procedures Complete By Expires    GLUCOSE 1HR GESTATIONAL  As directed 3/17/2018    HCT  As directed 3/17/2018    HGB  As directed 3/17/2018    T.PALLIDUM AB EIA  As directed 3/17/2018      Other Notes About Your Plan     Baby Girl-           MyChart Status: Patient Declined

## 2017-03-30 ENCOUNTER — HOSPITAL ENCOUNTER (OUTPATIENT)
Dept: LAB | Facility: MEDICAL CENTER | Age: 22
End: 2017-03-30
Attending: NURSE PRACTITIONER
Payer: MEDICAID

## 2017-03-30 DIAGNOSIS — Z34.82 ENCOUNTER FOR SUPERVISION OF NORMAL PREGNANCY IN MULTIGRAVIDA IN SECOND TRIMESTER: ICD-10-CM

## 2017-03-30 LAB
GLUCOSE 1H P 50 G GLC PO SERPL-MCNC: 122 MG/DL (ref 70–139)
HCT VFR BLD AUTO: 35 % (ref 37–47)
HGB BLD-MCNC: 11.2 G/DL (ref 12–16)
TREPONEMA PALLIDUM IGG+IGM AB [PRESENCE] IN SERUM OR PLASMA BY IMMUNOASSAY: NON REACTIVE

## 2017-03-30 PROCEDURE — 36415 COLL VENOUS BLD VENIPUNCTURE: CPT

## 2017-03-30 PROCEDURE — 85014 HEMATOCRIT: CPT

## 2017-03-30 PROCEDURE — 85018 HEMOGLOBIN: CPT

## 2017-03-30 PROCEDURE — 86780 TREPONEMA PALLIDUM: CPT

## 2017-03-30 PROCEDURE — 82950 GLUCOSE TEST: CPT

## 2017-04-12 ENCOUNTER — ROUTINE PRENATAL (OUTPATIENT)
Dept: OBGYN | Facility: CLINIC | Age: 22
End: 2017-04-12
Payer: MEDICAID

## 2017-04-12 VITALS — SYSTOLIC BLOOD PRESSURE: 104 MMHG | BODY MASS INDEX: 31.65 KG/M2 | WEIGHT: 196 LBS | DIASTOLIC BLOOD PRESSURE: 68 MMHG

## 2017-04-12 DIAGNOSIS — Z34.82 ENCOUNTER FOR SUPERVISION OF NORMAL PREGNANCY IN MULTIGRAVIDA IN SECOND TRIMESTER: ICD-10-CM

## 2017-04-12 DIAGNOSIS — K80.20 GALLSTONES: ICD-10-CM

## 2017-04-12 PROCEDURE — 90040 PR PRENATAL FOLLOW UP: CPT | Performed by: NURSE PRACTITIONER

## 2017-04-12 NOTE — Clinical Note
Cuente los Movimientos de sawant Bebé  Otro paso importante para la amy de sawant bebé    Merari Mcghee     [facility]            Dept: [phone]    ¿Cuántas semanas tiene de embarazo? 29w5d    Fecha cuando tiene que comenzar a contar el movimiento: 4/12/17                  Berry debe usar ariel diagrama    Manish manera en que sawant doctor puede controlar a amy de sawant bebé es sabiendo cuantas veces se mueve sawant bebé en el útero, o por medio de las “pataditas”.  Usted podrá ayudarle a sawant médico al usar cada día el siguiente diagrama.    Cada día, usted debe prestar atención a cuantas horas le lleva a sawant bebé moverse 10 veces.  Comience a contar en la mañana, lo antes posible después de haberse levantado.    · Primeramente, escriba la hora en que se mueve sawant bebé, hasta llegar a 10 veces.  · Colóquele un check o palomita a cada cuadrito cada vez que sawant bebé se mueva hasta que complete 10 veces.  · Escriba la hora cuando termine de contar 10 veces en la última columna.  · Sume el total del tiempo que le llevó contar los 10 movimientos.  · Finalmente, complete el cuadrito de cuantas horas le llevó hacerlo.    Después de kathi contado los 10 movimientos, ya no tendrá que contar los demás movimientos por el adryan del día.  A la mañana siguiente, comience a contar de nuevo cuantas veces se mueve el bebé desde el momento en que se levante.    ¿Qué tendría que considerarse un “movimiento”?  Es difícil de decirlo porque es distinto de manish madre a otra, y de un embarazo a otro.  Lo importante es que cuente el movimiento de la misma manera sandip el transcurso de sawant embarazo.  Si tiene preguntas adicionales, pregúntele a sawant doctor.    ¡Cuente cuidadosamente cada día!     MUESTRA:  Semana 28    ¿Cuántas horas le ha llevado sentir 10 movimientos?        Hora de Inicio     1     2     3     4     5     6     7     8     9     10   Hora de Finlizar   Rose. 8:20 ·  ·  ·  ·  ·  ·  ·  ·  ·  ·  11:40   Mar.               Mié.                Jue.               Vie.               Sáb.               Dom.                 IMPORTANTE:  Usted debe contactar a sawant doctor si le lleva más de 2 horas sentir 10 movimientos de sawant bebé.    Cada mañana, escriba la hora de inicio y comience a contar los movimientos de sawant bebé.  Hágalo colocándole un check o palomita a cada cuadrito cada vez que sienta un movimiento de sawant bebé.  Cuando haya sentido 10 “pataditas”, escriba la hora en que terminó de contar en la última columna.  Luego, complete en la cajita (arriba de la aristeo de check o palomita) el número total de horas que le llevó hacerlo.  Asegúrese de leer completamente las instrucciones en la página anterior.

## 2017-04-12 NOTE — PROGRESS NOTES
Pt here today for OB follow up  Pt states no complaints   Reports +FM  WT:196lb  BP:104/68  Good # [phone]  Pt given mary sheet and instructions.   Pt declines Tdap.

## 2017-04-12 NOTE — PROGRESS NOTES
Declines TDAP.  Will start kick counts and do daily.  Glucose WNL.  Continue taking prenatal vitamins and increase water intake

## 2017-04-12 NOTE — MR AVS SNAPSHOT
Merari Mcghee   2017 9:30 AM   Routine Prenatal   MRN: [id number]    [facility]   Dept Phone:  [phone]    Description:  Female : 1995   Provider:  ELSY Alvarenga           Allergies as of 2017     No Known Allergies      You were diagnosed with     Encounter for supervision of normal pregnancy in multigravida in second trimester   [4646143]       Gallstones   [[id number]]         Vital Signs     Blood Pressure Weight Last Menstrual Period Breastfeeding? Smoking Status       104/68 mmHg 88.905 kg (196 lb) 2016 (Exact Date) Unknown Never Smoker        Basic Information     Date Of Birth Sex Race Ethnicity Preferred Language    1995 Female White  Origin (Bruneian,Ivorian,Djiboutian,Mirza, etc) Bruneian      Problem List              ICD-10-CM Priority Class Noted - Resolved    Encounter for supervision of normal pregnancy in multigravida in second trimester Z34.82   2017 - Present      Health Maintenance        Date Due Completion Dates    IMM HEP B VACCINE (1 of 3 - Primary Series) 1995 ---    IMM HEP A VACCINE (1 of 2 - Standard Series) 4/15/1995 ---    IMM HPV VACCINE (1 of 3 - Female 3 Dose Series) 4/15/2005 ---    IMM VARICELLA (CHICKENPOX) VACCINE (1 of 2 - 2 Dose Adolescent Series) 4/15/2007 ---    IMM DTaP/Tdap/Td Vaccine (1 - Tdap) 4/15/2013 ---    PAP SMEAR 2020            Current Immunizations     No immunizations on file.      Below and/or attached are the medications your provider expects you to take. Review all of your home medications and newly ordered medications with your provider and/or pharmacist. Follow medication instructions as directed by your provider and/or pharmacist. Please keep your medication list with you and share with your provider. Update the information when medications are discontinued, doses are changed, or new medications (including over-the-counter products) are added; and carry  medication information at all times in the event of emergency situations     Allergies:  No Known Allergies          Medications  Valid as of: April 12, 2017 -  9:37 AM    Generic Name Brand Name Tablet Size Instructions for use    Hydrocodone-Acetaminophen (Tab) NORCO 5-325 MG Take 1-2 Tabs by mouth every four hours as needed.        Ondansetron (TABLET DISPERSIBLE) ZOFRAN ODT 4 MG Take 1 Tab by mouth every 8 hours as needed for Nausea/Vomiting.        Ondansetron HCl (Tab) ZOFRAN 4 MG Take 1 Tab by mouth every four hours as needed for Nausea/Vomiting.        Prenatal Multivit-Min-Fe-FA   Take 1 Tab by mouth every day.        .                 Medicines prescribed today were sent to:     [facility] [address] [place], [place] [address], [place] - [address] AT [facility]    [address] [place] [address]    Phone: [phone] Fax: [phone]    Open 24 Hours?: No      Medication refill instructions:       If your prescription bottle indicates you have medication refills left, it is not necessary to call your provider’s office. Please contact your pharmacy and they will refill your medication.    If your prescription bottle indicates you do not have any refills left, you may request refills at any time through one of the following ways: The online [employer] system (except Urgent Care), by calling your provider’s office, or by asking your pharmacy to contact your provider’s office with a refill request. Medication refills are processed only during regular business hours and may not be available until the next business day. Your provider may request additional information or to have a follow-up visit with you prior to refilling your medication.   *Please Note: Medication refills are assigned a new Rx number when refilled electronically. Your pharmacy may indicate that no refills were authorized even though a new prescription for the same medication is available at the pharmacy. Please request the medicine by  name with the pharmacy before contacting your provider for a refill.        Other Notes About Your Plan     Baby Girl-           MyChart Status: Patient Declined

## 2017-04-26 ENCOUNTER — ROUTINE PRENATAL (OUTPATIENT)
Dept: OBGYN | Facility: CLINIC | Age: 22
End: 2017-04-26
Payer: MEDICAID

## 2017-04-26 VITALS — BODY MASS INDEX: 31.81 KG/M2 | DIASTOLIC BLOOD PRESSURE: 60 MMHG | SYSTOLIC BLOOD PRESSURE: 100 MMHG | WEIGHT: 197 LBS

## 2017-04-26 DIAGNOSIS — Z34.82 ENCOUNTER FOR SUPERVISION OF NORMAL PREGNANCY IN MULTIGRAVIDA IN SECOND TRIMESTER: ICD-10-CM

## 2017-04-26 PROCEDURE — 90040 PR PRENATAL FOLLOW UP: CPT | Performed by: PHYSICIAN ASSISTANT

## 2017-04-26 NOTE — MR AVS SNAPSHOT
Merari Mcghee   2017 3:00 PM   Routine Prenatal   MRN: [id number]    [facility]   Dept Phone:  [phone]    Description:  Female : 1995   Provider:  VALENTINA Carrizales           Allergies as of 2017     No Known Allergies      You were diagnosed with     Encounter for supervision of normal pregnancy in multigravida in second trimester   [[id number]]         Vital Signs     Blood Pressure Weight Last Menstrual Period Smoking Status          100/60 mmHg 89.359 kg (197 lb) 2016 (Exact Date) Never Smoker         Basic Information     Date Of Birth Sex Race Ethnicity Preferred Language    1995 Female White  Origin (Macedonian,Venezuelan,Brazilian,Mirza, etc) Macedonian      Problem List              ICD-10-CM Priority Class Noted - Resolved    Encounter for supervision of normal pregnancy in multigravida in second trimester Z34.82   2017 - Present      Health Maintenance        Date Due Completion Dates    IMM HEP B VACCINE (1 of 3 - Primary Series) 1995 ---    IMM HEP A VACCINE (1 of 2 - Standard Series) 4/15/1995 ---    IMM HPV VACCINE (1 of 3 - Female 3 Dose Series) 4/15/2005 ---    IMM VARICELLA (CHICKENPOX) VACCINE (1 of 2 - 2 Dose Adolescent Series) 4/15/2007 ---    IMM DTaP/Tdap/Td Vaccine (1 - Tdap) 4/15/2013 ---    PAP SMEAR 2020            Current Immunizations     No immunizations on file.      Below and/or attached are the medications your provider expects you to take. Review all of your home medications and newly ordered medications with your provider and/or pharmacist. Follow medication instructions as directed by your provider and/or pharmacist. Please keep your medication list with you and share with your provider. Update the information when medications are discontinued, doses are changed, or new medications (including over-the-counter products) are added; and carry medication information at all times in the event of  emergency situations     Allergies:  No Known Allergies          Medications  Valid as of: April 26, 2017 -  3:01 PM    Generic Name Brand Name Tablet Size Instructions for use    Hydrocodone-Acetaminophen (Tab) NORCO 5-325 MG Take 1-2 Tabs by mouth every four hours as needed.        Ondansetron (TABLET DISPERSIBLE) ZOFRAN ODT 4 MG Take 1 Tab by mouth every 8 hours as needed for Nausea/Vomiting.        Ondansetron HCl (Tab) ZOFRAN 4 MG Take 1 Tab by mouth every four hours as needed for Nausea/Vomiting.        Prenatal Multivit-Min-Fe-FA   Take 1 Tab by mouth every day.        .                 Medicines prescribed today were sent to:     [facility] [address] [place] [address], [place] - [address] AT [facility] [employer] [employer]    [address] [place] [address]    Phone: [phone] Fax: [phone]    Open 24 Hours?: No      Medication refill instructions:       If your prescription bottle indicates you have medication refills left, it is not necessary to call your provider’s office. Please contact your pharmacy and they will refill your medication.    If your prescription bottle indicates you do not have any refills left, you may request refills at any time through one of the following ways: The online [employer] system (except Urgent Care), by calling your provider’s office, or by asking your pharmacy to contact your provider’s office with a refill request. Medication refills are processed only during regular business hours and may not be available until the next business day. Your provider may request additional information or to have a follow-up visit with you prior to refilling your medication.   *Please Note: Medication refills are assigned a new Rx number when refilled electronically. Your pharmacy may indicate that no refills were authorized even though a new prescription for the same medication is available at the pharmacy. Please request the medicine by name with the pharmacy before contacting your provider  for a refill.        Other Notes About Your Plan     Baby Girl - Mariola Clifford Status: Patient Declined

## 2017-04-26 NOTE — PROGRESS NOTES
Pt has no complaints with cramping, UCs, VB, LOF though pt has had a lot more pressure over the past few weeks. +FM. Declines TDaP. RTC 2 wk or sooner prn.

## 2017-05-10 ENCOUNTER — ROUTINE PRENATAL (OUTPATIENT)
Dept: OBGYN | Facility: CLINIC | Age: 22
End: 2017-05-10
Payer: MEDICAID

## 2017-05-10 VITALS — DIASTOLIC BLOOD PRESSURE: 66 MMHG | SYSTOLIC BLOOD PRESSURE: 118 MMHG | BODY MASS INDEX: 32.3 KG/M2 | WEIGHT: 200 LBS

## 2017-05-10 DIAGNOSIS — Z34.82 ENCOUNTER FOR SUPERVISION OF NORMAL PREGNANCY IN MULTIGRAVIDA IN SECOND TRIMESTER: ICD-10-CM

## 2017-05-10 PROCEDURE — 90040 PR PRENATAL FOLLOW UP: CPT | Performed by: NURSE PRACTITIONER

## 2017-05-10 NOTE — MR AVS SNAPSHOT
Merari Mcghee   5/10/2017 10:45 AM   Routine Prenatal   MRN: [id number]    [facility]   Dept Phone:  [phone]    Description:  Female : 1995   Provider:  Tessa Vazquez C.N.M.           Allergies as of 5/10/2017     No Known Allergies      You were diagnosed with     Encounter for supervision of normal pregnancy in multigravida in second trimester   [[id number]]         Vital Signs     Blood Pressure Weight Last Menstrual Period Smoking Status          118/66 mmHg 90.719 kg (200 lb) 2016 (Exact Date) Never Smoker         Basic Information     Date Of Birth Sex Race Ethnicity Preferred Language    1995 Female White  Origin (Iranian,Costa Rican,Lithuanian,Mirza, etc) Iranian      Problem List              ICD-10-CM Priority Class Noted - Resolved    Encounter for supervision of normal pregnancy in multigravida in second trimester Z34.82 Medium  2017 - Present      Health Maintenance        Date Due Completion Dates    IMM HEP B VACCINE (1 of 3 - Primary Series) 1995 ---    IMM HEP A VACCINE (1 of 2 - Standard Series) 4/15/1995 ---    IMM HPV VACCINE (1 of 3 - Female 3 Dose Series) 4/15/2005 ---    IMM VARICELLA (CHICKENPOX) VACCINE (1 of 2 - 2 Dose Adolescent Series) 4/15/2007 ---    IMM DTaP/Tdap/Td Vaccine (1 - Tdap) 4/15/2013 ---    PAP SMEAR 2020            Current Immunizations     No immunizations on file.      Below and/or attached are the medications your provider expects you to take. Review all of your home medications and newly ordered medications with your provider and/or pharmacist. Follow medication instructions as directed by your provider and/or pharmacist. Please keep your medication list with you and share with your provider. Update the information when medications are discontinued, doses are changed, or new medications (including over-the-counter products) are added; and carry medication information at all times in  the event of emergency situations     Allergies:  No Known Allergies          Medications  Valid as of: May 10, 2017 - 11:09 AM    Generic Name Brand Name Tablet Size Instructions for use    Hydrocodone-Acetaminophen (Tab) NORCO 5-325 MG Take 1-2 Tabs by mouth every four hours as needed.        Ondansetron (TABLET DISPERSIBLE) ZOFRAN ODT 4 MG Take 1 Tab by mouth every 8 hours as needed for Nausea/Vomiting.        Ondansetron HCl (Tab) ZOFRAN 4 MG Take 1 Tab by mouth every four hours as needed for Nausea/Vomiting.        Prenatal Multivit-Min-Fe-FA   Take 1 Tab by mouth every day.        .                 Medicines prescribed today were sent to:     [facility] [address] [place] [address] [place], [place] - [address] AT [facility] [employer]    [address] [place] [address]    Phone: [phone] Fax: [phone]    Open 24 Hours?: No      Medication refill instructions:       If your prescription bottle indicates you have medication refills left, it is not necessary to call your provider’s office. Please contact your pharmacy and they will refill your medication.    If your prescription bottle indicates you do not have any refills left, you may request refills at any time through one of the following ways: The online [employer] system (except Urgent Care), by calling your provider’s office, or by asking your pharmacy to contact your provider’s office with a refill request. Medication refills are processed only during regular business hours and may not be available until the next business day. Your provider may request additional information or to have a follow-up visit with you prior to refilling your medication.   *Please Note: Medication refills are assigned a new Rx number when refilled electronically. Your pharmacy may indicate that no refills were authorized even though a new prescription for the same medication is available at the pharmacy. Please request the medicine by name with the pharmacy before contacting  your provider for a refill.        Other Notes About Your Plan     PNL WNL, AFP Normal, US WNL, 1 Hr 122. Declines BTL. Baby Girl - Mariola Clifford Status: Patient Declined

## 2017-05-10 NOTE — PROGRESS NOTES
S) Pt is a 22 y.o.   at 33w5d  gestation. Routine prenatal care today. No complaints today.  Reports good fetal movement. Denies cramping, bleeding or leaking of fluid. Denies dysuria. Generally feels well today. Good self-care activities identified. Denies headaches, visual changes, epigastric pain, or swelling.     O) see flow         Filed Vitals:    05/10/17 1057   BP: 118/66   Weight: 90.719 kg (200 lb)           Lab:PNL WNL,        Pertinent ultrasound -          2017 1:26 PM    HISTORY/REASON FOR EXAM:  Evaluate fetal anatomy, alpha-fetoprotein within normal limits    TECHNIQUE/EXAM DESCRIPTION: OB complete ultrasound.    COMPARISON:  None    FINDINGS:  Fetal Lie:  Vertex  LMP:  2016  Clinical GREG by LMP:  2017    Placenta (Location):  Posterior  Placenta Previa: No  Placental Grade: I    Amniotic Fluid Volume:  MELCHRO = 15.57 cm    Fetal Heart Rate:  141 bpm    Cervical Length:  3.71 cm transabdominal    No maternal adnexal mass is identified.    Umbilical Artery S/D Ratio(s):  Not applicable    Fetal Anatomy  (Seen or Not Seen)  Lateral Ventricles     Seen  Cisterna Magna        Seen  Cerebellum              Seen  CSP             Seen  Orbits             Seen  Face/Lips                Seen  Cord Insertion         Seen  Placental CI         Seen  4 Chamber Heart     Seen  LVOT               Seen  RVOT              Seen  Stomach       Seen  Kidneys                   Seen  Urinary Bladder      Seen  Spine                       Seen  3 Vessel Cord          Seen  Both Upper Extremities    Seen  Both Lower Extremities    Seen  Diaphragm             Seen  Movement       Seen  Gender:  Likely female    Fetal Biometry  BPD    5.15 cm, 21 weeks, 4 days  HC    18.74 cm, 21 weeks  AC    16.66 cm, 21 weeks, 5 days  Femur Length    3.43 cm, 20 weeks, 6 days  Humerus Length    3.18 cm, 20 weeks, 4 days  Cerebellum Diameter   2.28 cm    EGA by this US:  21 weeks, 1 day  GREG by this US:  6/21/2017  GREG by 1st US:  6/22/2017 - ER    Estimated Fetal Weight:  412 grams    Comments:         Impression        1.  Single intrauterine pregnancy of an estimated gestational age of 21 weeks, 1 day with an estimated date of delivery of 6/21/2017.  2.  Fetal survey within normal limits.           A) IUP at 33w5d       S=D         Patient Active Problem List    Diagnosis Date Noted   • Encounter for supervision of normal pregnancy in multigravida in second trimester 01/24/2017           P) s/s ptl vs general discomforts. Fetal movements reviewed. General ed and anticipatory guidance. Nutrition/exercise/vitamin. Plans breast. Plans pp contraception- unsure. Continue PNV.

## 2017-05-10 NOTE — PROGRESS NOTES
Pt. Here for OB/FU today. Reports Good FM.   Good # [phone]  Pt states having increase in discharge denies itching, burning or odor.   Pt declined Tdap.

## 2017-05-24 ENCOUNTER — HOSPITAL ENCOUNTER (OUTPATIENT)
Facility: MEDICAL CENTER | Age: 22
End: 2017-05-24
Attending: OBSTETRICS & GYNECOLOGY
Payer: MEDICAID

## 2017-05-24 ENCOUNTER — ROUTINE PRENATAL (OUTPATIENT)
Dept: OBGYN | Facility: CLINIC | Age: 22
End: 2017-05-24
Payer: MEDICAID

## 2017-05-24 VITALS — DIASTOLIC BLOOD PRESSURE: 68 MMHG | BODY MASS INDEX: 32.3 KG/M2 | WEIGHT: 200 LBS | SYSTOLIC BLOOD PRESSURE: 104 MMHG

## 2017-05-24 DIAGNOSIS — Z34.82 ENCOUNTER FOR SUPERVISION OF NORMAL PREGNANCY IN MULTIGRAVIDA IN SECOND TRIMESTER: ICD-10-CM

## 2017-05-24 DIAGNOSIS — O26.843 UTERINE SIZE DATE DISCREPANCY, THIRD TRIMESTER: ICD-10-CM

## 2017-05-24 PROCEDURE — 90040 PR PRENATAL FOLLOW UP: CPT | Performed by: OBSTETRICS & GYNECOLOGY

## 2017-05-24 PROCEDURE — 87653 STREP B DNA AMP PROBE: CPT

## 2017-05-24 NOTE — PROGRESS NOTES
Pt here today for OB follow up  Pt states  Having contractions and severe pelvic pain.   Reports +  Good # [phone]  Pharmacy Confirmed.  GBS Today

## 2017-05-24 NOTE — PROGRESS NOTES
Patient is at 35w5d. (+) occasional contractions, good FM, no LOF, no VB  Patients' weight gain, fluid intake and exercise level discussed.Vitals, fundal height , fetal position, and FHR reviewed on flowsheet.    .../68 mmHg  Wt 90.719 kg (200 lb)  LMP 2016 (Exact Date)  Past Medical History   Diagnosis Date   • Pregnant 16     reports 19 weeks     Patient Active Problem List    Diagnosis Date Noted   • Encounter for supervision of normal pregnancy in multigravida in second trimester 2017     Priority: Medium     Lab:No results found for this or any previous visit (from the past 336 hour(s)).    Assessment: 22 year old   1  35w5d  2. Doing well  3. Size less than dates  4. Weight gain: normal: Yes, excessive:No                    Plan:  1. Rediscuss diet.  2. Increase water intake   3. Continue vitamins.  4. Kick counts as instructed.  5. PTL/labor precautions  6. GBS collected  7. OB ff-up 1 week  8. Growth US/MELCHOR order placed

## 2017-05-24 NOTE — MR AVS SNAPSHOT
Merari Mcghee   2017 11:30 AM   Routine Prenatal   MRN: [id number]    Department:  [facility]   Dept Phone:  [phone]    Description:  Female : 1995   Provider:  Emily Obando M.D.           Allergies as of 2017     No Known Allergies      You were diagnosed with     Encounter for supervision of normal pregnancy in multigravida in second trimester   [6336822]       Uterine size date discrepancy, third trimester   [936690]         Vital Signs     Blood Pressure Weight Last Menstrual Period Smoking Status          104/68 mmHg 90.719 kg (200 lb) 2016 (Exact Date) Never Smoker         Basic Information     Date Of Birth Sex Race Ethnicity Preferred Language    1995 Female White  Origin (S[place]an,Welsh,Citizen of the [place],Montenegrin, etc) [place]an      Your appointments     May 26, 2017  3:30 PM   US PREG 30 with PREG CTR US 1   [facility] ([facility])    [facility]  [address] [address]   [phone]           For [facility] patients only: 1. Please arrive 15 min prior to your appointment time. 2. If you're late, you will be rescheduled for the next available appointment. 3. If you need to reschedule your appointment, please call us at [phone] 48 hours prior to your appointment. 4. Do not bring children as they will not be allowed in exam room. 5. Only one family member may be present in room during exam. 6. The exam will be 30-60 minutes depending on the exam ordered by the physician. 7. The sonographer is not allowed to discuss findings during the exam. Your provider will go over the results with you at your next appointment. 8. The purpose of this ultrasound is to determine if baby is healthy. Diagnostic ultrasounds are NOT to determine the gender of the baby. 9. NO photography or video recording is allowed in exam room. 10. NO cell phones allowed in the exam room.  INFORMACION SOBRE SAWANT ULTRASONIDO 1. Por favor de llegar 15 minutos antes de sawant john. 2. Si llega tarde, le tenemos que cambiar la john para otra fecha. 3. Si necesita cambiar sawant john, por favor llame 48 horas antes de la john. [phone] 4. Por favor no traer niños. No se permiten en cuarto de Ultrasonido. 5. Solamente se permite manish persona en el cuarto sandip el examen. 6. El examen dura 30-60 minutos, dependiendo del examen ordenado por el Doctor. 7. El Sonógrafo no está autorizado hablar sobre sawant examen. Sawant doctor o partera le va explicar los resultados en sawant próxima john. 8. El propósito del Ultrasonido es para determinar si sawant ephraim viene saludable. No es para determinar el sexo de sawant ephraim. 9. Por favor no fotos o cámaras de grabar. 10. No celulares permitidos en el cuarto de examen.              Problem List              ICD-10-CM Priority Class Noted - Resolved    Encounter for supervision of normal pregnancy in multigravida in second trimester Z34.82 Medium  1/24/2017 - Present      Health Maintenance        Date Due Completion Dates    IMM HEP B VACCINE (1 of 3 - Primary Series) 1995 ---    IMM HEP A VACCINE (1 of 2 - Standard Series) 4/15/1995 ---    IMM HPV VACCINE (1 of 3 - Female 3 Dose Series) 4/15/2005 ---    IMM VARICELLA (CHICKENPOX) VACCINE (1 of 2 - 2 Dose Adolescent Series) 4/15/2007 ---    IMM DTaP/Tdap/Td Vaccine (1 - Tdap) 4/15/2013 ---    PAP SMEAR 1/24/2020 1/24/2017            Current Immunizations     No immunizations on file.      Below and/or attached are the medications your provider expects you to take. Review all of your home medications and newly ordered medications with your provider and/or pharmacist. Follow medication instructions as directed by your provider and/or pharmacist. Please keep your medication list with you and share with your provider. Update the information when medications are discontinued, doses are changed, or new medications (including over-the-counter  products) are added; and carry medication information at all times in the event of emergency situations     Allergies:  No Known Allergies          Medications  Valid as of: May 24, 2017 - 11:56 AM    Generic Name Brand Name Tablet Size Instructions for use    Hydrocodone-Acetaminophen (Tab) NORCO 5-325 MG Take 1-2 Tabs by mouth every four hours as needed.        Ondansetron (TABLET DISPERSIBLE) ZOFRAN ODT 4 MG Take 1 Tab by mouth every 8 hours as needed for Nausea/Vomiting.        Ondansetron HCl (Tab) ZOFRAN 4 MG Take 1 Tab by mouth every four hours as needed for Nausea/Vomiting.        Prenatal Multivit-Min-Fe-FA   Take 1 Tab by mouth every day.        .                 Medicines prescribed today were sent to:     [facility] [address] [place] [address], [place] - [address] [facility] [employer] [facility] [facility]    [address] [place] [address]    Phone: [phone] Fax: [phone]    Open 24 Hours?: No      Medication refill instructions:       If your prescription bottle indicates you have medication refills left, it is not necessary to call your provider’s office. Please contact your pharmacy and they will refill your medication.    If your prescription bottle indicates you do not have any refills left, you may request refills at any time through one of the following ways: The online Campalyst system (except Urgent Care), by calling your provider’s office, or by asking your pharmacy to contact your provider’s office with a refill request. Medication refills are processed only during regular business hours and may not be available until the next business day. Your provider may request additional information or to have a follow-up visit with you prior to refilling your medication.   *Please Note: Medication refills are assigned a new Rx number when refilled electronically. Your pharmacy may indicate that no refills were authorized even though a new prescription for the same medication is available at the pharmacy.  Please request the medicine by name with the pharmacy before contacting your provider for a refill.        Your To Do List     Future Labs/Procedures Complete By Expires    GRP B STREP, BY PCR (YANG BROTH)  As directed 5/24/2018    Comments:    SOURCE VAGINAL AND RECTAL      Other Notes About Your Plan     PNL WNL, AFP Normal, US WNL, 1 Hr 122. Declines BTL. Baby Girl - [facility] Status: Patient Declined

## 2017-05-26 ENCOUNTER — APPOINTMENT (OUTPATIENT)
Dept: RADIOLOGY | Facility: IMAGING CENTER | Age: 22
End: 2017-05-26
Attending: OBSTETRICS & GYNECOLOGY
Payer: MEDICAID

## 2017-05-26 LAB — GP B STREP DNA SPEC QL NAA+PROBE: NEGATIVE

## 2017-05-26 PROCEDURE — 76815 OB US LIMITED FETUS(S): CPT | Mod: 26 | Performed by: OBSTETRICS & GYNECOLOGY

## 2017-05-30 ENCOUNTER — ROUTINE PRENATAL (OUTPATIENT)
Dept: OBGYN | Facility: CLINIC | Age: 22
End: 2017-05-30
Payer: MEDICAID

## 2017-05-30 VITALS — WEIGHT: 206 LBS | SYSTOLIC BLOOD PRESSURE: 112 MMHG | DIASTOLIC BLOOD PRESSURE: 58 MMHG | BODY MASS INDEX: 33.27 KG/M2

## 2017-05-30 DIAGNOSIS — Z34.82 ENCOUNTER FOR SUPERVISION OF NORMAL PREGNANCY IN MULTIGRAVIDA IN SECOND TRIMESTER: ICD-10-CM

## 2017-05-30 PROCEDURE — 90040 PR PRENATAL FOLLOW UP: CPT | Performed by: NURSE PRACTITIONER

## 2017-05-30 NOTE — MR AVS SNAPSHOT
Merari Mcghee   2017 10:30 AM   Routine Prenatal   MRN: [id number]    Department:  [facility]   Dept Phone:  [phone]    Description:  Female : 1995   Provider:  ELSY Alvarenga           Allergies as of 2017     No Known Allergies      You were diagnosed with     Encounter for supervision of normal pregnancy in multigravida in second trimester   [[id number]]         Vital Signs     Blood Pressure Weight Last Menstrual Period Smoking Status          112/58 mmHg 93.441 kg (206 lb) 2016 (Exact Date) Never Smoker         Basic Information     Date Of Birth Sex Race Ethnicity Preferred Language    1995 Female White  Origin (Guatemalan,Martiniquais,Kenyan,Ethiopian, etc) Guatemalan      Your appointments     2017  8:30 AM   OB Follow Up with Agnes Jaramillo C.N.M.   [facility] [address]  [facility] [address] [phone]              Problem List              ICD-10-CM Priority Class Noted - Resolved    Encounter for supervision of normal pregnancy in multigravida in second trimester Z34.82 Medium  2017 - Present      Health Maintenance        Date Due Completion Dates    IMM HEP B VACCINE (1 of 3 - Primary Series) 1995 ---    IMM HEP A VACCINE (1 of 2 - Standard Series) 4/15/1995 ---    IMM HPV VACCINE (1 of 3 - Female 3 Dose Series) 4/15/2005 ---    IMM VARICELLA (CHICKENPOX) VACCINE (1 of 2 - 2 Dose Adolescent Series) 4/15/2007 ---    IMM DTaP/Tdap/Td Vaccine (1 - Tdap) 4/15/2013 ---    PAP SMEAR 2020            Current Immunizations     No immunizations on file.      Below and/or attached are the medications your provider expects you to take. Review all of your home medications and newly ordered medications with your provider and/or pharmacist. Follow medication instructions as directed by your provider and/or pharmacist. Please keep your medication list with you and share with your provider.  Update the information when medications are discontinued, doses are changed, or new medications (including over-the-counter products) are added; and carry medication information at all times in the event of emergency situations     Allergies:  No Known Allergies          Medications  Valid as of: May 30, 2017 - 10:36 AM    Generic Name Brand Name Tablet Size Instructions for use    Hydrocodone-Acetaminophen (Tab) NORCO 5-325 MG Take 1-2 Tabs by mouth every four hours as needed.        Ondansetron (TABLET DISPERSIBLE) ZOFRAN ODT 4 MG Take 1 Tab by mouth every 8 hours as needed for Nausea/Vomiting.        Ondansetron HCl (Tab) ZOFRAN 4 MG Take 1 Tab by mouth every four hours as needed for Nausea/Vomiting.        Prenatal Multivit-Min-Fe-FA   Take 1 Tab by mouth every day.        .                 Medicines prescribed today were sent to:     [facility] [address] [place] [address], [place] - [address] [facility]    [address] [place] [address]    Phone: [phone] Fax: [phone]    Open 24 Hours?: No      Medication refill instructions:       If your prescription bottle indicates you have medication refills left, it is not necessary to call your provider’s office. Please contact your pharmacy and they will refill your medication.    If your prescription bottle indicates you do not have any refills left, you may request refills at any time through one of the following ways: The online Adu[employer] system (except Urgent Care), by calling your provider’s office, or by asking your pharmacy to contact your provider’s office with a refill request. Medication refills are processed only during regular business hours and may not be available until the next business day. Your provider may request additional information or to have a follow-up visit with you prior to refilling your medication.   *Please Note: Medication refills are assigned a new Rx number when refilled electronically. Your pharmacy may  indicate that no refills were authorized even though a new prescription for the same medication is available at the pharmacy. Please request the medicine by name with the pharmacy before contacting your provider for a refill.        Other Notes About Your Plan     PNL WNL, AFP Normal, US WNL, 1 Hr 122. Declines BTL. Baby Girl - Mariola           [facility] Status: Patient Declined

## 2017-05-30 NOTE — PROGRESS NOTES
Pt here today for OB follow up  Reports +FM  WT: 206 lb  BP: 112/58  Pt states no complaints today  Good # [phone]

## 2017-06-06 ENCOUNTER — ROUTINE PRENATAL (OUTPATIENT)
Dept: OBGYN | Facility: CLINIC | Age: 22
End: 2017-06-06
Payer: MEDICAID

## 2017-06-06 VITALS — BODY MASS INDEX: 33.27 KG/M2 | DIASTOLIC BLOOD PRESSURE: 72 MMHG | SYSTOLIC BLOOD PRESSURE: 110 MMHG | WEIGHT: 206 LBS

## 2017-06-06 DIAGNOSIS — Z34.82 ENCOUNTER FOR SUPERVISION OF NORMAL PREGNANCY IN MULTIGRAVIDA IN SECOND TRIMESTER: Primary | ICD-10-CM

## 2017-06-06 PROCEDURE — 90040 PR PRENATAL FOLLOW UP: CPT | Performed by: NURSE PRACTITIONER

## 2017-06-06 NOTE — PATIENT INSTRUCTIONS
P:  1.  Continue FKCs.         2.  Labor precautions given.  Instructions given on where to go.  Pt receptive to education.         3.  Reviewed GBS status w pt.       4.  Questions answered.         5.  Encouraged adequate water intake       6.  F/u 1wk       7.  FYI:none.

## 2017-06-06 NOTE — MR AVS SNAPSHOT
Merari Mcghee   2017 8:30 AM   Routine Prenatal   MRN: [id number]    Department:  [facility]   Dept Phone:  [phone]    Description:  Female : 1995   Provider:  Agnes Jaramillo C.N.M.           Allergies as of 2017     No Known Allergies      You were diagnosed with     Encounter for supervision of normal pregnancy in multigravida in second trimester   [[id number]]  -  Primary       Vital Signs     Blood Pressure Weight Last Menstrual Period Smoking Status          110/72 mmHg 93.441 kg (206 lb) 2016 (Exact Date) Never Smoker         Basic Information     Date Of Birth Sex Race Ethnicity Preferred Language    1995 Female White  Origin (Danish,Colombian,Wallisian,Cambodian, etc) Danish      Problem List              ICD-10-CM Priority Class Noted - Resolved    Encounter for supervision of normal pregnancy in multigravida in second trimester Z34.82 Medium  2017 - Present      Health Maintenance        Date Due Completion Dates    IMM HEP B VACCINE (1 of 3 - Primary Series) 1995 ---    IMM HEP A VACCINE (1 of 2 - Standard Series) 4/15/1995 ---    IMM HPV VACCINE (1 of 3 - Female 3 Dose Series) 4/15/2005 ---    IMM VARICELLA (CHICKENPOX) VACCINE (1 of 2 - 2 Dose Adolescent Series) 4/15/2007 ---    IMM DTaP/Tdap/Td Vaccine (1 - Tdap) 4/15/2013 ---    PAP SMEAR 2020            Current Immunizations     No immunizations on file.      Below and/or attached are the medications your provider expects you to take. Review all of your home medications and newly ordered medications with your provider and/or pharmacist. Follow medication instructions as directed by your provider and/or pharmacist. Please keep your medication list with you and share with your provider. Update the information when medications are discontinued, doses are changed, or new medications (including over-the-counter products) are added; and carry medication information at all  times in the event of emergency situations     Allergies:  No Known Allergies          Medications  Valid as of: June 06, 2017 -  8:59 AM    Generic Name Brand Name Tablet Size Instructions for use    Prenatal Multivit-Min-Fe-FA   Take 1 Tab by mouth every day.        .                 Medicines prescribed today were sent to:     [facility] [id number]  [place], [place] - [address] [facility] [place] [facility] [facility] [address] [facility]    [address] [place] [address]    Phone: [phone] Fax: [phone]    Open 24 Hours?: No      Medication refill instructions:       If your prescription bottle indicates you have medication refills left, it is not necessary to call your provider’s office. Please contact your pharmacy and they will refill your medication.    If your prescription bottle indicates you do not have any refills left, you may request refills at any time through one of the following ways: The online [employer]u system (except Urgent Care), by calling your provider’s office, or by asking your pharmacy to contact your provider’s office with a refill request. Medication refills are processed only during regular business hours and may not be available until the next business day. Your provider may request additional information or to have a follow-up visit with you prior to refilling your medication.   *Please Note: Medication refills are assigned a new Rx number when refilled electronically. Your pharmacy may indicate that no refills were authorized even though a new prescription for the same medication is available at the pharmacy. Please request the medicine by name with the pharmacy before contacting your provider for a refill.        Instructions    P:  1.  Continue FKCs.         2.  Labor precautions given.  Instructions given on where to go.  Pt receptive to education.         3.  Reviewed GBS status w pt.       4.  Questions answered.         5.  Encouraged adequate water intake       6.  F/u 1wk       7.   FYI:none.       Other Notes About Your Plan     PNL WNL, AFP Normal, US WNL, 1 Hr 122. Declines BTL. Baby Girl - Mariola Clifford Status: Patient Declined

## 2017-06-06 NOTE — PROGRESS NOTES
OB f/u. + fetal movement.  No VB, LOF or UC's.  Wt:206lb       BP: 110/72  Good phone # [phone]  Preferred pharmacy confirmed.  GBS negative

## 2017-06-06 NOTE — PROGRESS NOTES
S:  Pt is  at 37w4d here for routine OB follow up.  No c/o.  Reports good FM.  Denies VB, LOF, RUCs, or vaginal DC.     O:  Please see above vitals.        FHTs: 148        Fundal ht: 37        Fetal position: vertex        SVE: deferred        GBS neg on 17 -- reviewed w pt.      A:  IUP at 37w4d  Patient Active Problem List    Diagnosis Date Noted   • Encounter for supervision of normal pregnancy in multigravida in second trimester 2017     Priority: Medium       P:  1.  Continue FKCs.         2.  Labor precautions given.  Instructions given on where to go.  Pt receptive to education.         3.  Reviewed GBS status w pt.       4.  Questions answered.         5.  Encouraged adequate water intake       6.  F/u 1wk       7.  FYI:none.

## 2017-06-12 ENCOUNTER — ROUTINE PRENATAL (OUTPATIENT)
Dept: OBGYN | Facility: CLINIC | Age: 22
End: 2017-06-12
Payer: MEDICAID

## 2017-06-12 VITALS — WEIGHT: 210 LBS | BODY MASS INDEX: 33.91 KG/M2 | DIASTOLIC BLOOD PRESSURE: 70 MMHG | SYSTOLIC BLOOD PRESSURE: 104 MMHG

## 2017-06-12 DIAGNOSIS — Z34.82 ENCOUNTER FOR SUPERVISION OF NORMAL PREGNANCY IN MULTIGRAVIDA IN SECOND TRIMESTER: Primary | ICD-10-CM

## 2017-06-12 PROCEDURE — 90040 PR PRENATAL FOLLOW UP: CPT | Performed by: NURSE PRACTITIONER

## 2017-06-12 NOTE — PATIENT INSTRUCTIONS
P:  1.  Continue FKCs.         2.  Labor precautions given.  Instructions given on where to go.  Pt receptive to education.         3.  D/w pt IOL policy.  IOL referral placed.        4.  Questions answered.         5.  Encouraged adequate water intake       6.  F/u 1wk       7.  FYI: none.

## 2017-06-12 NOTE — PROGRESS NOTES
Pt here today for OB follow up  Reports +FM  Denies any complaints  WT:210lb  BP: 104/70  Good #  [phone]

## 2017-06-12 NOTE — PROGRESS NOTES
S:  Pt is  at 38w3d here for routine OB follow up.  No c/o, occ UC.  Reports good FM.  Denies VB, LOF, RUCs, or vaginal DC.     O:  Please see above vitals.        FHTs: 151        Fundal ht: 36        Fetal position: vertex        SVE: 50/-2        GBS neg on 17 -- reviewed w pt.      A:  IUP at 38w3d  Patient Active Problem List    Diagnosis Date Noted   • Encounter for supervision of normal pregnancy in multigravida in second trimester 2017     Priority: Medium       P:  1.  Continue FKCs.         2.  Labor precautions given.  Instructions given on where to go.  Pt receptive to education.         3.  D/w pt IOL policy.  IOL referral placed.        4.  Questions answered.         5.  Encouraged adequate water intake       6.  F/u 1wk       7.  FYI: none.

## 2017-06-12 NOTE — MR AVS SNAPSHOT
Merari Mcghee   2017 4:00 PM   Routine Prenatal   MRN: [id number]    Department:  [facility]   Dept Phone:  [phone]    Description:  Female : 1995   Provider:  Agnes Jaramillo C.N.M.           Allergies as of 2017     No Known Allergies      You were diagnosed with     Encounter for supervision of normal pregnancy in multigravida in second trimester   [[id number]]  -  Primary       Vital Signs     Blood Pressure Weight Last Menstrual Period Smoking Status          104/70 mmHg 95.255 kg (210 lb) 2016 (Exact Date) Never Smoker         Basic Information     Date Of Birth Sex Race Ethnicity Preferred Language    1995 Female White  Origin (Turks and Ca[place] Islander,Tristanian,Comoran,Greenlandic, etc) Turks and [place] Islander      Your appointments     2017 10:45 AM   OB Follow Up with Linh Doran D.N.P.   [facility] [address] [id number] [phone]              Problem List              ICD-10-CM Priority Class Noted - Resolved    Encounter for supervision of normal pregnancy in multigravida in second trimester Z34.82 Medium  2017 - Present      Health Maintenance        Date Due Completion Dates    IMM HEP B VACCINE (1 of 3 - Primary Series) 1995 ---    IMM HEP A VACCINE (1 of 2 - Standard Series) 4/15/1995 ---    IMM HPV VACCINE (1 of 3 - Female 3 Dose Series) 4/15/2005 ---    IMM VARICELLA (CHICKENPOX) VACCINE (1 of 2 - 2 Dose Adolescent Series) 4/15/2007 ---    IMM DTaP/Tdap/Td Vaccine (1 - Tdap) 4/15/2013 ---    PAP SMEAR 2020            Current Immunizations     No immunizations on file.      Below and/or attached are the medications your provider expects you to take. Review all of your home medications and newly ordered medications with your provider and/or pharmacist. Follow medication instructions as directed by your provider and/or pharmacist. Please keep your medication list with you and share with your  provider. Update the information when medications are discontinued, doses are changed, or new medications (including over-the-counter products) are added; and carry medication information at all times in the event of emergency situations     Allergies:  No Known Allergies          Medications  Valid as of: June 12, 2017 -  4:09 PM    Generic Name Brand Name Tablet Size Instructions for use    Prenatal Multivit-Min-Fe-FA   Take 1 Tab by mouth every day.        .                 Medicines prescribed today were sent to:     [facility] [address] [place] [address], [place] - [address] AT [facility] [employer]    [address] [place] [address]    Phone: [phone] Fax: [phone]    Open 24 Hours?: No      Medication refill instructions:       If your prescription bottle indicates you have medication refills left, it is not necessary to call your provider’s office. Please contact your pharmacy and they will refill your medication.    If your prescription bottle indicates you do not have any refills left, you may request refills at any time through one of the following ways: The online Carma system (except Urgent Care), by calling your provider’s office, or by asking your pharmacy to contact your provider’s office with a refill request. Medication refills are processed only during regular business hours and may not be available until the next business day. Your provider may request additional information or to have a follow-up visit with you prior to refilling your medication.   *Please Note: Medication refills are assigned a new Rx number when refilled electronically. Your pharmacy may indicate that no refills were authorized even though a new prescription for the same medication is available at the pharmacy. Please request the medicine by name with the pharmacy before contacting your provider for a refill.        Your To Do List     Future Labs/Procedures Complete By Expires    INDUCTION OF LABOR  As directed  6/12/2018      Instructions    P:  1.  Continue FKCs.         2.  Labor precautions given.  Instructions given on where to go.  Pt receptive to education.         3.  D/w pt IOL policy.  IOL referral placed.        4.  Questions answered.         5.  Encouraged adequate water intake       6.  F/u 1wk       7.  FYI: none.       Other Notes About Your Plan     PNL WNL, AFP Normal, US WNL, 1 Hr 122. Declines BTL. Baby Girl - [facility] Status: Patient Declined

## 2017-06-19 ENCOUNTER — ROUTINE PRENATAL (OUTPATIENT)
Dept: OBGYN | Facility: CLINIC | Age: 22
End: 2017-06-19
Payer: MEDICAID

## 2017-06-19 VITALS — BODY MASS INDEX: 33.91 KG/M2 | DIASTOLIC BLOOD PRESSURE: 66 MMHG | WEIGHT: 210 LBS | SYSTOLIC BLOOD PRESSURE: 104 MMHG

## 2017-06-19 DIAGNOSIS — Z34.82 ENCOUNTER FOR SUPERVISION OF NORMAL PREGNANCY IN MULTIGRAVIDA IN SECOND TRIMESTER: ICD-10-CM

## 2017-06-19 PROCEDURE — 90040 PR PRENATAL FOLLOW UP: CPT | Performed by: NURSE PRACTITIONER

## 2017-06-19 NOTE — MR AVS SNAPSHOT
Merari Mcghee   2017 10:45 AM   Routine Prenatal   MRN: [id number]    [facility]   Dept Phone:  [phone]    Description:  Female : 1995   Provider:  Linh Doran D.N.P.           Allergies as of 2017     No Known Allergies      You were diagnosed with     Encounter for supervision of normal pregnancy in multigravida in second trimester   [8218021]         Vital Signs     Blood Pressure Weight Last Menstrual Period Smoking Status          104/66 mmHg 95.255 kg (210 lb) 2016 (Exact Date) Never Smoker         Basic Information     Date Of Birth Sex Race Ethnicity Preferred Language    1995 Female White  Origin (American,Burundian,Palestinian,Mirza, etc) American      Your appointments     2017  9:00 AM   OB Follow Up with Tessa Vazquez C.N.M.   [facility] [address]  [facility] [address]   [phone]            2017  8:00 PM   [facility] OP GEL IOL with NON-SURGICAL L&D   LABOR & DELIVERY [facility] (--)    [address] [facility] [address]   [phone]            2017  8:00 AM   [facility] INDUCTION OF LABOR with NON-SURGICAL L&D   LABOR & DELIVERY [facility] (--)    [address] [facility] [address]   [phone]              Problem List              ICD-10-CM Priority Class Noted - Resolved    Encounter for supervision of normal pregnancy in multigravida in second trimester Z34.82 Medium  2017 - Present      Health Maintenance        Date Due Completion Dates    IMM HEP B VACCINE (1 of 3 - Primary Series) 1995 ---    IMM HEP A VACCINE (1 of 2 - Standard Series) 4/15/1995 ---    IMM HPV VACCINE (1 of 3 - Female 3 Dose Series) 4/15/2005 ---    IMM VARICELLA (CHICKENPOX) VACCINE (1 of 2 - 2 Dose Adolescent Series) 4/15/2007 ---    IMM DTaP/Tdap/Td Vaccine (1 - Tdap) 4/15/2013 ---    PAP SMEAR 2020            Current Immunizations     No immunizations on file.      Below and/or  attached are the medications your provider expects you to take. Review all of your home medications and newly ordered medications with your provider and/or pharmacist. Follow medication instructions as directed by your provider and/or pharmacist. Please keep your medication list with you and share with your provider. Update the information when medications are discontinued, doses are changed, or new medications (including over-the-counter products) are added; and carry medication information at all times in the event of emergency situations     Allergies:  No Known Allergies          Medications  Valid as of: June 19, 2017 - 10:56 AM    Generic Name Brand Name Tablet Size Instructions for use    Prenatal Multivit-Min-Fe-FA   Take 1 Tab by mouth every day.        .                 Medicines prescribed today were sent to:     [facility] [place] [address] [place], [place] [address]O, [place] - [address] AT [facility] [employer]    [address] [place] [address]    Phone: [phone] Fax: [phone]    Open 24 Hours?: No      Medication refill instructions:       If your prescription bottle indicates you have medication refills left, it is not necessary to call your provider’s office. Please contact your pharmacy and they will refill your medication.    If your prescription bottle indicates you do not have any refills left, you may request refills at any time through one of the following ways: The online [employer] system (except Urgent Care), by calling your provider’s office, or by asking your pharmacy to contact your provider’s office with a refill request. Medication refills are processed only during regular business hours and may not be available until the next business day. Your provider may request additional information or to have a follow-up visit with you prior to refilling your medication.   *Please Note: Medication refills are assigned a new Rx number when refilled electronically. Your pharmacy may indicate that no  refills were authorized even though a new prescription for the same medication is available at the pharmacy. Please request the medicine by name with the pharmacy before contacting your provider for a refill.        Other Notes About Your Plan     PNL WNL, AFP Normal, US WNL, 1 Hr 122. Declines BTL. Baby Girl - Mariola Clifford Status: Patient Declined

## 2017-06-19 NOTE — PROGRESS NOTES
S) Pt is a 22 y.o.   at 39w3d  gestation. Routine prenatal care today. States cramping approximately Q 10 mins. No bleeding or leaking of fluid.  Reports good  fetal movement. Denies dysuria. Generally feels well today. Good self-care activities identified. Denies headaches.     O) see flow         Filed Vitals:    17 1044   BP: 104/66   Weight: 95.255 kg (210 lb)           Lab: normal prenatal labs       Pertinent ultrasound - Normal fetal survey            A) IUP at 39w3d       S=D         Patient Active Problem List    Diagnosis Date Noted   • Encounter for supervision of normal pregnancy in multigravida in second trimester 2017     Priority: Medium         P) s/s labor vs general discomforts. Fetal movements reviewed. General ed and anticipatory guidance. Nutrition/exercise/vitamin. Continue PNV. Understands induction of labor instructions in absence of spontaneous labor.   Plans breast or bottle - breast   Plans pp contraception - is undecided, still has sexual partner.   STD protection - no new exposure.   Smoking/etoh/drugs - no exposure.    Flu shot recommended - none in clinic

## 2017-06-19 NOTE — PROGRESS NOTES
Ob F/U  +FM  Pt c/o contractions and spotting last night.   Good phone number-[phone]  GBS-negative  Pt informed about GEL on 07/01/17 at 8pm and IOL on 07/02/17 @ 8:00am

## 2017-07-25 ENCOUNTER — POST PARTUM (OUTPATIENT)
Dept: OBGYN | Facility: CLINIC | Age: 22
End: 2017-07-25
Payer: MEDICAID

## 2017-07-25 VITALS
BODY MASS INDEX: 31.18 KG/M2 | DIASTOLIC BLOOD PRESSURE: 70 MMHG | SYSTOLIC BLOOD PRESSURE: 110 MMHG | WEIGHT: 194 LBS | HEIGHT: 66 IN

## 2017-07-25 PROBLEM — Z34.82 ENCOUNTER FOR SUPERVISION OF NORMAL PREGNANCY IN MULTIGRAVIDA IN SECOND TRIMESTER: Status: RESOLVED | Noted: 2017-01-24 | Resolved: 2017-07-25

## 2017-07-25 PROCEDURE — 90050 PR POSTPARTUM VISIT: CPT | Performed by: NURSE PRACTITIONER

## 2017-07-25 NOTE — PROGRESS NOTES
Subjective   Subjective:    Merari Mcghee is a 22 y.o. female who presents for her postpartum exam s/p  with second degree laceration. Her prenatal course was uncomplicated. Her postpartum course was uncomplicated. She denies dysuria, vaginal bleeding, odor, itching or breast problems. She is breast feeding. She desires condoms for her birth control method. Reports no  sex prior to this appointment. Eating a regular diet without difficulty. Bowel movement are Normal.  The patient is not having any pain. No current menses. Patient Denies Incisional pain, drainage or redness. Patient denies postpartum depression.     Problem List     Patient Active Problem List    Diagnosis Date Noted   • Encounter for supervision of normal pregnancy in multigravida in second trimester 2017     Priority: Medium   • Labor and delivery, indication for care 2017       Objective    See PE  Lab: H&H upon dishcarge 9.7.1  Weight   Vitals - 110/70    Assessment   Assessment:    1. PP care of lactating women   2. Exam WNL  3. Pap WNL   4. Desires contraception - condoms    Plan   Plan:    1. Breastfeeding support   2. Continue PNV   3. Contraceptive counseling - follow up w health dept or [facility] for ongoing women's health and contraception  4. Encouraged condom use   5. Discussed diet, exercise and resumption of sexual activity   6. Preconception guidance for next pregnancy if applicable. No specific risk factors. Folic acid for all women of childbearing age.

## 2017-07-25 NOTE — PROGRESS NOTES
"Subjective:      eMrari Mcghee is a 22 y.o. female who presents with No chief complaint on file.            HPI    ROS       Objective:     /70 mmHg  Ht 1.676 m (5' 6\")  Wt 87.998 kg (194 lb)  BMI 31.33 kg/m2  LMP 09/16/2016 (Exact Date)  Breastfeeding? Unknown     Physical Exam   Constitutional: She is oriented to person, place, and time. She appears well-developed.   HENT:   Head: Normocephalic.   Eyes: Pupils are equal, round, and reactive to light.   Neck: Normal range of motion. No thyromegaly present.   Cardiovascular: Normal rate.    Pulmonary/Chest: Effort normal and breath sounds normal.   Abdominal: Soft. Bowel sounds are normal.   Genitourinary: Vagina normal.   Musculoskeletal: Normal range of motion.   Neurological: She is alert and oriented to person, place, and time.   Skin: Skin is warm and dry.   Psychiatric: She has a normal mood and affect. Her behavior is normal. Judgment and thought content normal.   Nursing note and vitals reviewed.              Assessment/Plan:     There are no diagnoses linked to this encounter.      "

## 2017-07-25 NOTE — NON-PROVIDER
Pt here today for postpartum exam.  Delivery type & date: vaginal delivery 06/20/2017  Birth control meth desired: not sure yet   Currently : breast feeding   LMP: N/A  Last pap: 01/30/2017 Normal   Phone # [phone]  Sad, or crying : No  C/O pt states feel well after labor

## 2018-10-25 NOTE — MR AVS SNAPSHOT
Merari Mcghee   2017 9:15 AM   Routine Prenatal   MRN: [id number]    [facility]   Dept Phone:  [phone]    Description:  Female : 1995   Provider:  DEEPA Duff           Allergies as of 2017     No Known Allergies      You were diagnosed with     Encounter for supervision of normal pregnancy in multigravida in second trimester   [[id number]]       Gallstones   [[id number]]         Vital Signs     Blood Pressure Weight Last Menstrual Period Smoking Status          98/64 mmHg 82.101 kg (181 lb) 2016 (Exact Date) Never Smoker         Basic Information     Date Of Birth Sex Race Ethnicity Preferred Language    1995 Female White  Origin (Slovak,Andorran,Malawian,Citizen of [place], etc) Slovak      Problem List              ICD-10-CM Priority Class Noted - Resolved    Encounter for supervision of normal pregnancy in multigravida in second trimester Z34.82   2017 - Present    Gallstones, scheduled for surgery on 2017 K80.20   2017 - Present    Cholelithiasis K80.20   2017 - Present      Health Maintenance        Date Due Completion Dates    IMM HEP B VACCINE (1 of 3 - Primary Series) 1995 ---    IMM HEP A VACCINE (1 of 2 - Standard Series) 4/15/1995 ---    IMM HPV VACCINE (1 of 3 - Female 3 Dose Series) 4/15/2005 ---    IMM VARICELLA (CHICKENPOX) VACCINE (1 of 2 - 2 Dose Adolescent Series) 4/15/2007 ---    IMM DTaP/Tdap/Td Vaccine (1 - Tdap) 4/15/2013 ---    IMM INFLUENZA (1) 2016 ---    PAP SMEAR 2020            Current Immunizations     No immunizations on file.      Below and/or attached are the medications your provider expects you to take. Review all of your home medications and newly ordered medications with your provider and/or pharmacist. Follow medication instructions as directed by your provider and/or pharmacist. Please keep your medication list with you and share with your provider. Update the  information when medications are discontinued, doses are changed, or new medications (including over-the-counter products) are added; and carry medication information at all times in the event of emergency situations     Allergies:  No Known Allergies          Medications  Valid as of: February 14, 2017 -  9:44 AM    Generic Name Brand Name Tablet Size Instructions for use    Hydrocodone-Acetaminophen (Tab) NORCO 5-325 MG Take 1-2 Tabs by mouth every four hours as needed.        Ondansetron (TABLET DISPERSIBLE) ZOFRAN ODT 4 MG Take 1 Tab by mouth every 8 hours as needed for Nausea/Vomiting.        Ondansetron HCl (Tab) ZOFRAN 4 MG Take 1 Tab by mouth every four hours as needed for Nausea/Vomiting.        Prenatal Multivit-Min-Fe-FA   Take 1 Tab by mouth every day.        .                 Medicines prescribed today were sent to:     [facility] [address] [place] [address], [place] - [address] [facility] [employer]    [address] [place] [address]    Phone: [phone] Fax: [phone]    Open 24 Hours?: No      Medication refill instructions:       If your prescription bottle indicates you have medication refills left, it is not necessary to call your provider’s office. Please contact your pharmacy and they will refill your medication.    If your prescription bottle indicates you do not have any refills left, you may request refills at any time through one of the following ways: The online [employer] system (except Urgent Care), by calling your provider’s office, or by asking your pharmacy to contact your provider’s office with a refill request. Medication refills are processed only during regular business hours and may not be available until the next business day. Your provider may request additional information or to have a follow-up visit with you prior to refilling your medication.   *Please Note: Medication refills are assigned a new Rx number when refilled electronically. Your pharmacy may indicate  that no refills were authorized even though a new prescription for the same medication is available at the pharmacy. Please request the medicine by name with the pharmacy before contacting your provider for a refill.        Other Notes About Your Plan     Baby Girl-           MyChart Status: Patient Declined         yes

## 2019-06-21 ENCOUNTER — GYNECOLOGY VISIT (OUTPATIENT)
Dept: OBGYN | Facility: CLINIC | Age: 24
End: 2019-06-21

## 2019-06-21 VITALS — DIASTOLIC BLOOD PRESSURE: 60 MMHG | WEIGHT: 199 LBS | SYSTOLIC BLOOD PRESSURE: 104 MMHG | BODY MASS INDEX: 32.12 KG/M2

## 2019-06-21 DIAGNOSIS — N93.8 DUB (DYSFUNCTIONAL UTERINE BLEEDING): ICD-10-CM

## 2019-06-21 LAB
INT CON NEG: NEGATIVE
INT CON POS: POSITIVE
POC URINE PREGNANCY TEST: POSITIVE

## 2019-06-21 PROCEDURE — 99212 OFFICE O/P EST SF 10 MIN: CPT | Performed by: ADVANCED PRACTICE MIDWIFE

## 2019-06-21 PROCEDURE — 81025 URINE PREGNANCY TEST: CPT | Performed by: ADVANCED PRACTICE MIDWIFE

## 2019-06-21 NOTE — PROGRESS NOTES
Merari Mcghee is a 24 y.o. female who presents for amenorrhea        [facility] Comments: Pt presents for possible pregnancy.  Patient's last menstrual period was 03/25/2019. She does not feel movement. Denies nausea or vomiting.     Review of Systems   Pertinent positives documented in HPI and all other systems reviewed & are negative      Past Medical History:   Diagnosis Date   • Pregnant 01-30-17    reports 19 weeks       Medications:   Current Outpatient Prescriptions Ordered in [facility]   Medication Sig Dispense Refill   • Prenatal Multivit-Min-Fe-FA (PRENATAL VITAMINS PO) Take 1 Tab by mouth every day.     • ibuprofen (MOTRIN) 600 MG Tab Take 1 Tab by mouth every 6 hours as needed (For cramping after delivery; do not give if patient is receiving ketorolac (Toradol)). (Patient not taking: Reported on 6/21/2019) 30 Tab 0   • ferrous sulfate 325 (65 FE) MG tablet Take 1 Tab by mouth 3 times a day, with meals. (Patient not taking: Reported on 6/21/2019) 30 Tab 0     No current Epic-ordered facility-administered medications on file.           Objective:   Vital measurements:  /60   Wt 90.3 kg (199 lb)   Body mass index is 32.12 kg/m². (Goal BM I>18 <25)    Physical Exam   Nursing note and vitals reviewed.  Constitutional: She is oriented to person, place, and time. She appears well-developed and well-nourished. No distress.     Abdominal: Soft. Bowel sounds are normal. She exhibits no distension and no mass. No tenderness. She has no rebound and no guarding.     Genitourinary:  Uterus size of 12   No vaginal bleeding or discharge noted.     Musculoskeletal: Normal range of motion. She exhibits no edema and no tenderness.     Skin: Skin is warm and dry. No rash noted. She is not diaphoretic. No erythema. No pallor.     Psychiatric: She has a normal mood and affect. Her behavior is normal. Judgment and thought content normal.     FHTs 156              Assessment:     1. DUB (dysfunctional uterine bleeding)  POCT  Pregnancy       Plan:   1. Discussed importance of prenatal vitamins with patient. Encouraged daily use.  2. Schedule financial counselor visit in next few weeks.   3. Follow up in 4 weeks for NOB visit.

## 2019-07-15 PROBLEM — Z34.80 SUPERVISION OF OTHER NORMAL PREGNANCY, ANTEPARTUM: Status: ACTIVE | Noted: 2019-07-15

## 2019-08-30 ENCOUNTER — HOSPITAL ENCOUNTER (OUTPATIENT)
Facility: MEDICAL CENTER | Age: 24
End: 2019-08-30
Attending: NURSE PRACTITIONER
Payer: MEDICAID

## 2019-08-30 ENCOUNTER — INITIAL PRENATAL (OUTPATIENT)
Dept: OBGYN | Facility: CLINIC | Age: 24
End: 2019-08-30
Payer: MEDICAID

## 2019-08-30 ENCOUNTER — HOSPITAL ENCOUNTER (OUTPATIENT)
Dept: LAB | Facility: MEDICAL CENTER | Age: 24
End: 2019-08-30
Attending: NURSE PRACTITIONER
Payer: MEDICAID

## 2019-08-30 VITALS
SYSTOLIC BLOOD PRESSURE: 114 MMHG | BODY MASS INDEX: 31.82 KG/M2 | HEIGHT: 66 IN | WEIGHT: 198 LBS | DIASTOLIC BLOOD PRESSURE: 60 MMHG

## 2019-08-30 DIAGNOSIS — Z34.80 SUPERVISION OF OTHER NORMAL PREGNANCY, ANTEPARTUM: ICD-10-CM

## 2019-08-30 DIAGNOSIS — Z34.80 SUPERVISION OF OTHER NORMAL PREGNANCY, ANTEPARTUM: Primary | ICD-10-CM

## 2019-08-30 LAB
ABO GROUP BLD: NORMAL
AMORPH CRY #/AREA URNS HPF: PRESENT /HPF
APPEARANCE UR: ABNORMAL
APPEARANCE UR: NORMAL
BACTERIA #/AREA URNS HPF: ABNORMAL /HPF
BASOPHILS # BLD AUTO: 0.5 % (ref 0–1.8)
BASOPHILS # BLD: 0.05 K/UL (ref 0–0.12)
BILIRUB UR QL STRIP.AUTO: NEGATIVE
BILIRUB UR STRIP-MCNC: NORMAL MG/DL
BLD GP AB SCN SERPL QL: NORMAL
C TRACH DNA SPEC QL NAA+PROBE: NEGATIVE
C TRACH DNA SPEC QL NAA+PROBE: NEGATIVE
COLOR UR AUTO: NORMAL
COLOR UR: YELLOW
EOSINOPHIL # BLD AUTO: 0.12 K/UL (ref 0–0.51)
EOSINOPHIL NFR BLD: 1.3 % (ref 0–6.9)
EPI CELLS #/AREA URNS HPF: NEGATIVE /HPF
ERYTHROCYTE [DISTWIDTH] IN BLOOD BY AUTOMATED COUNT: 47.6 FL (ref 35.9–50)
GLUCOSE UR STRIP.AUTO-MCNC: NEGATIVE MG/DL
GLUCOSE UR STRIP.AUTO-MCNC: NEGATIVE MG/DL
HBV SURFACE AG SER QL: NEGATIVE
HCT VFR BLD AUTO: 39.2 % (ref 37–47)
HGB BLD-MCNC: 11.9 G/DL (ref 12–16)
HIV 1+2 AB+HIV1 P24 AG SERPL QL IA: NON REACTIVE
IMM GRANULOCYTES # BLD AUTO: 0.1 K/UL (ref 0–0.11)
IMM GRANULOCYTES NFR BLD AUTO: 1.1 % (ref 0–0.9)
KETONES UR STRIP.AUTO-MCNC: NEGATIVE MG/DL
KETONES UR STRIP.AUTO-MCNC: NEGATIVE MG/DL
LEUKOCYTE ESTERASE UR QL STRIP.AUTO: NEGATIVE
LEUKOCYTE ESTERASE UR QL STRIP.AUTO: NORMAL
LYMPHOCYTES # BLD AUTO: 1.47 K/UL (ref 1–4.8)
LYMPHOCYTES NFR BLD: 15.8 % (ref 22–41)
MCH RBC QN AUTO: 26 PG (ref 27–33)
MCHC RBC AUTO-ENTMCNC: 30.4 G/DL (ref 33.6–35)
MCV RBC AUTO: 85.8 FL (ref 81.4–97.8)
MICRO URNS: ABNORMAL
MONOCYTES # BLD AUTO: 0.52 K/UL (ref 0–0.85)
MONOCYTES NFR BLD AUTO: 5.6 % (ref 0–13.4)
N GONORRHOEA DNA SPEC QL NAA+PROBE: NEGATIVE
N GONORRHOEA DNA SPEC QL NAA+PROBE: NEGATIVE
NEUTROPHILS # BLD AUTO: 7.07 K/UL (ref 2–7.15)
NEUTROPHILS NFR BLD: 75.7 % (ref 44–72)
NITRITE UR QL STRIP.AUTO: NEGATIVE
NITRITE UR QL STRIP.AUTO: NEGATIVE
NRBC # BLD AUTO: 0 K/UL
NRBC BLD-RTO: 0 /100 WBC
PH UR STRIP.AUTO: 6 [PH] (ref 5–8)
PH UR STRIP.AUTO: 6 [PH] (ref 5–8)
PLATELET # BLD AUTO: 220 K/UL (ref 164–446)
PMV BLD AUTO: 11 FL (ref 9–12.9)
PROT UR QL STRIP: NEGATIVE MG/DL
PROT UR QL STRIP: NORMAL MG/DL
RBC # BLD AUTO: 4.57 M/UL (ref 4.2–5.4)
RBC # URNS HPF: ABNORMAL /HPF
RBC UR QL AUTO: NEGATIVE
RBC UR QL AUTO: NEGATIVE
RH BLD: NORMAL
RUBV AB SER QL: 45.4 IU/ML
SP GR UR STRIP.AUTO: 1.02
SP GR UR STRIP.AUTO: >=1.03
SPECIMEN SOURCE: NORMAL
SPECIMEN SOURCE: NORMAL
TREPONEMA PALLIDUM IGG+IGM AB [PRESENCE] IN SERUM OR PLASMA BY IMMUNOASSAY: NON REACTIVE
UROBILINOGEN UR STRIP-MCNC: NORMAL MG/DL
UROBILINOGEN UR STRIP.AUTO-MCNC: 0.2 MG/DL
WBC # BLD AUTO: 9.3 K/UL (ref 4.8–10.8)
WBC #/AREA URNS HPF: ABNORMAL /HPF

## 2019-08-30 PROCEDURE — 87389 HIV-1 AG W/HIV-1&-2 AB AG IA: CPT

## 2019-08-30 PROCEDURE — 36415 COLL VENOUS BLD VENIPUNCTURE: CPT

## 2019-08-30 PROCEDURE — 86900 BLOOD TYPING SEROLOGIC ABO: CPT

## 2019-08-30 PROCEDURE — 81001 URINALYSIS AUTO W/SCOPE: CPT

## 2019-08-30 PROCEDURE — 87591 N.GONORRHOEAE DNA AMP PROB: CPT | Mod: 91

## 2019-08-30 PROCEDURE — 87591 N.GONORRHOEAE DNA AMP PROB: CPT

## 2019-08-30 PROCEDURE — 87491 CHLMYD TRACH DNA AMP PROBE: CPT

## 2019-08-30 PROCEDURE — 85025 COMPLETE CBC W/AUTO DIFF WBC: CPT

## 2019-08-30 PROCEDURE — 86901 BLOOD TYPING SEROLOGIC RH(D): CPT

## 2019-08-30 PROCEDURE — 86850 RBC ANTIBODY SCREEN: CPT

## 2019-08-30 PROCEDURE — 86780 TREPONEMA PALLIDUM: CPT

## 2019-08-30 PROCEDURE — 81002 URINALYSIS NONAUTO W/O SCOPE: CPT | Performed by: NURSE PRACTITIONER

## 2019-08-30 PROCEDURE — 86762 RUBELLA ANTIBODY: CPT

## 2019-08-30 PROCEDURE — 87340 HEPATITIS B SURFACE AG IA: CPT

## 2019-08-30 PROCEDURE — 0500F INITIAL PRENATAL CARE VISIT: CPT | Performed by: NURSE PRACTITIONER

## 2019-08-30 PROCEDURE — 87491 CHLMYD TRACH DNA AMP PROBE: CPT | Mod: 91

## 2019-08-30 ASSESSMENT — ENCOUNTER SYMPTOMS
RESPIRATORY NEGATIVE: 1
MUSCULOSKELETAL NEGATIVE: 1
CONSTITUTIONAL NEGATIVE: 1
CARDIOVASCULAR NEGATIVE: 1
NEUROLOGICAL NEGATIVE: 1
PSYCHIATRIC NEGATIVE: 1
GASTROINTESTINAL NEGATIVE: 1
EYES NEGATIVE: 1

## 2019-08-30 NOTE — LETTER
Estudios Para Detectar los Portadores de la Fibrosis Quística   (La Enfermedad Fibroquística del Páncreas)    Merari Ashley Mcghee    Esta información esta relacionada con un examen de juan francisco que puede determinar si usted o sawant rafa es portador del gen que causa la enfermedad hereditaria que se llama la fibrosis quística.     ¿QUE ES LA ENFERMEDAD FIBROSIS QUÍSTICA?  · La  fibrosis quística es manish enfermedad hereditaria que afecta a más de 25,000 niños y jóvenes adultos americanos.  · Los síntomas de la fibrosis quística varían de manish persona a otra.  Los síntomas más comunes son congestión pulmonar, diarrea y retraso en el crecimiento del shelby.  La mayoría de las personas con la fibrosis quística padecen de problemas médicos muy severos, y algunas mueren jóvenes.  Otras tienen tan pocos síntomas que no se percatan de que tienen fibrosis quística.  · La fibrosis quística no afecta en absoluto la inteligencia de la persona.  · Aunque actualmente no hay manish gerber para la fibrosis quística, los científicos están avanzando con respecto a nuevos tratamientos y en la búsqueda de la gerber.  Anteriormente la mayoría de las personas que padecían de fibrosis quística morían muy jóvenes.  Hoy en día, muchas personas que padecen de la fibrosis quística sobreviven hasta los 20 o 30 anos de edad.    ¿EXISTE LA POSIBILIDAD DE QUE MI DEQUAN PUEDA TENER FIBROSIS QUÍSTICA?  · Usted puede tener un hijo con la fibrosis quística aun cuando no hay nadie en sawant tiffany que la padezca.  (Andie la grafica que sigue.)  · Existe manish prueba que puede ayudarle a determinar si yfn un gen para la fibrosis quística y si por eso corre un riesgo de tener un hijo con la enfermedad.  · Si ambos padres son portadores del gen para la fibrosis quística, hay manish (1) probabilidad entre 4 (25%) en cada embarazo, de que puedan tener un hijo afectada con fibrosis quística.  · Los portadores del gen para la fibrosis quística tienen manish copia del gen  normal y el otro gen alterado.  · Las personas con fibrosis quística tienen dos genes alterados para la fibrosis quística,  · Normalmente la mayoría de individuos tienen dos copias normales del gen para fibrosis quística.    Riesgo aproximado de que manish rafa sin familiares con fibrosis quística pueda tener un hijo con fibrosis quística:  Origen / Riesgo  Manish rafa Caucásica (de david josi):  1 en 2,500  Manish rafa :  1 en 8,000  Manish rafa Afroamericana (de david puma):  1 en 15,000  Manish rafa Asiática:  1 en 32,000    ¿EXISTEN PRUEBAS PARA DETECTAR LOS PORTADORES DE LA FIBROSIS QUÍSTICA?  · Hay manish prueba de juan francisco para determinar si usted o sawant rafa portan el gen para la fibrosis quística.  · Es importante entender que la prueba no detecta todos los portadores del gen para la fibrosis quística.  · Si la prueba determina que ambos padres con portadores, se puede realizar otras pruebas para determinar si sawant futuro ephraim esta afectado.    ¿CUANTO ZONIA LA PRUEBA PARA DETERMINAR SI SOY PORTADOR(A) DEL GEN PARA LA FIBROSIS QUÍSTICA?  · El costo de la prueba varia según sawant póliza de seguro medico y el laboratorio donde se efectúa el análisis.  · El costo promedio de la prueba es de $300.  · Sawant consejera genética puede darle mas información acera de esta prueba para determinar si usted es portador de la fibrosis quística.    _____  Si, yo estoy interesada y me gustaria obtener mas información al respecto.  _____  No tengo interés ni en hacerme la prueba para determinar si soy portador(a) de gen para la fibrosis quística ni en recibir mas información al respecto.    Firma del paciente: _____________________________   8/30/2019

## 2019-08-30 NOTE — PROGRESS NOTES
Pt. Here for NOB visit today.  # [phone]  First prenatal care  Pt. States no complaints   +FM  Pharmacy verified  AFP, too late   Pt declines CF, consent signed    Chaperone offered and not indicated  Last PAP 1/27/19, WNL

## 2019-08-30 NOTE — LETTER
August 30, 2019              Merari Mcghee is currently being cared [facility].  Her hours/activities need to be modified.  She should not lift over 20 pounds repetitively.          Thank you,          TRINH Trinidad.    Electronically Signed     13-Jul-2018 14:02

## 2019-08-30 NOTE — PROGRESS NOTES
S:  Merari Mcghee is a 24 y.o.  who presents for her new OB exam.  She is 22w4d with and GREG of Estimated Date of Delivery: 19 based off of LMP . She has no complaints.  She is currently working at [employer], some  heavy lifting denies chemical exposure. No ER visits or previous care in this pregnancy.     Too late AFP.  Declines CF.  Denies VB, LOF, or cramping.  Denies dysuria, vaginal DC. Reports normal fetal movement.     Pt is  and lives with FOB and other children.  Pregnancy is unplanned but welcomed. .      Discussed diet and exercise during pregnancy. Encouraged good nutrition, and daily exercise including walking or swimming. Discussed expected weight gain during pregnancy. Discussed adequate hydration during pregnancy.  Review of Systems   Constitutional: Negative.    HENT: Negative.    Eyes: Negative.    Respiratory: Negative.    Cardiovascular: Negative.    Gastrointestinal: Negative.    Genitourinary: Negative.    Musculoskeletal: Negative.    Skin: Negative.    Neurological: Negative.    Endo/Heme/Allergies: Negative.    Psychiatric/Behavioral: Negative.    All other systems reviewed and are negative.      History reviewed. No pertinent past medical history.  Family History   Problem Relation Age of Onset   • No Known Problems Mother    • No Known Problems Father    • No Known Problems Sister    • No Known Problems Sister    • Other Sister 2        unknown   • Other Brother 8        car accident      Social History     Socioeconomic History   • Marital status: Single     Spouse name: Not on file   • Number of children: Not on file   • Years of education: Not on file   • Highest education level: Not on file   Occupational History   • Not on file   Social Needs   • Financial resource strain: Not on file   • Food insecurity:     Worry: Not on file     Inability: Not on file   • Transportation needs:     Medical: Not on file     Non-medical: Not on file   Tobacco Use   • Smoking  "status: Never Smoker   • Smokeless tobacco: Never Used   Substance and Sexual Activity   • Alcohol use: No     Alcohol/week: 0.0 oz   • Drug use: No   • Sexual activity: Yes     Partners: Male     Comment: None    Lifestyle   • Physical activity:     Days per week: Not on file     Minutes per session: Not on file   • Stress: Not on file   Relationships   • Social connections:     Talks on phone: Not on file     Gets together: Not on file     Attends [employer] service: Not on file     Active member of club or organization: Not on file     Attends meetings of clubs or organizations: Not on file     Relationship status: Not on file   • Intimate partner violence:     Fear of current or ex partner: Not on file     Emotionally abused: Not on file     Physically abused: Not on file     Forced sexual activity: Not on file   Other Topics Concern   • Not on file   Social History Narrative   • Not on file     OB History    Para Term  AB Living   3 2 2     2   SAB TAB Ectopic Molar Multiple Live Births             2      # Outcome Date GA Lbr Per/2nd Weight Sex Delivery Anes PTL Lv   3 Current            2 Term 17 39w3d  3.605 kg (7 lb 15.2 oz) F Vag-Spont   CHRISTIANO   1 Term 14 40w0d  3.487 kg (7 lb 11 oz) M Vag-Spont  N CHRISTIANO      Birth Comments: No complications       History of Varicella Virus: had as child  History of HSV I or II in self or partner: denies  History of Thyroid problems: denies    O:  /60   Ht 1.676 m (5' 6\")   Wt 89.8 kg (198 lb)    See Prenatal Physical.    Wet mount: deferred  Physical Exam   Constitutional: She is oriented to person, place, and time and well-developed, well-nourished, and in no distress.   HENT:   Head: Normocephalic and atraumatic.   Nose: Nose normal.   Eyes: Pupils are equal, round, and reactive to light. Conjunctivae and EOM are normal.   Neck: Normal range of motion. Neck supple. No thyromegaly present.   Cardiovascular: Normal rate, regular rhythm, normal " heart sounds and intact distal pulses.   Pulmonary/Chest: Effort normal and breath sounds normal.   Abdominal: Soft. Bowel sounds are normal. She exhibits distension.   C/w 20 weeks gestation   Musculoskeletal: Normal range of motion.   Neurological: She is alert and oriented to person, place, and time. Gait normal.   Skin: Skin is warm and dry.   Psychiatric: Mood, memory, affect and judgment normal.   Nursing note and vitals reviewed.        A:   1.  IUP @ 22w4d per LMP        2.  S=D        3.  See problem list below        4.  Late prenatal care       Patient Active Problem List    Diagnosis Date Noted   • Supervision of other normal pregnancy 07/15/2019         P:  1.  GC/CT done        2.  Prenatal labs ordered - lab slip given        3.  Discussed PNV, diet, avoidances and adequate water intake        4.  NOB packet given        5.  Return to office in 2 wks        6.  Complete OB US in ASAP wks            No orders of the defined types were placed in this encounter.

## 2019-09-18 ENCOUNTER — APPOINTMENT (OUTPATIENT)
Dept: RADIOLOGY | Facility: IMAGING CENTER | Age: 24
End: 2019-09-18
Attending: NURSE PRACTITIONER
Payer: MEDICAID

## 2019-09-18 DIAGNOSIS — Z34.80 SUPERVISION OF OTHER NORMAL PREGNANCY, ANTEPARTUM: ICD-10-CM

## 2019-09-18 PROCEDURE — 76805 OB US >/= 14 WKS SNGL FETUS: CPT | Performed by: OBSTETRICS & GYNECOLOGY

## 2019-09-19 DIAGNOSIS — Z34.80 SUPERVISION OF OTHER NORMAL PREGNANCY, ANTEPARTUM: ICD-10-CM

## 2019-09-26 PROBLEM — O40.9XX0 POLYHYDRAMNIOS AFFECTING PREGNANCY: Status: ACTIVE | Noted: 2019-09-26

## 2019-09-27 ENCOUNTER — ROUTINE PRENATAL (OUTPATIENT)
Dept: OBGYN | Facility: CLINIC | Age: 24
End: 2019-09-27
Payer: MEDICAID

## 2019-09-27 VITALS — BODY MASS INDEX: 32.93 KG/M2 | WEIGHT: 204 LBS | DIASTOLIC BLOOD PRESSURE: 62 MMHG | SYSTOLIC BLOOD PRESSURE: 108 MMHG

## 2019-09-27 DIAGNOSIS — O09.92 ENCOUNTER FOR SUPERVISION OF HIGH RISK PREGNANCY IN SECOND TRIMESTER, ANTEPARTUM: Primary | ICD-10-CM

## 2019-09-27 PROCEDURE — 90040 PR PRENATAL FOLLOW UP: CPT | Performed by: NURSE PRACTITIONER

## 2019-09-27 NOTE — PROGRESS NOTES
S:  Pt is  at 26w4d for routine OB follow up.  No concerns today. Pt was unaware of increased MELCHOR on US or need for repeat US.  Reports good FM.  Denies VB, LOF, RUCs or vaginal DC.    O:  Please see above vitals.        FHTs: 140        Fundal ht: 28 cm.        S>D            A:  IUP at 26w4d  Patient Active Problem List    Diagnosis Date Noted   • Polyhydramnios affecting pregnancy 2019   • Supervision of other normal pregnancy 07/15/2019        P:  1.  Nutrition/diet discussed        2.  Questions answered.          3.  Encourage adequate water intake.        4.  1hr GTT, syphilis and H/H ordered.  Lab slip and instructions given to pt.        5.   labor precautions reviewed.         6.  F/u 2 wks.        7.  US for MELCHOR ordered.

## 2019-09-27 NOTE — PROGRESS NOTES
Pt. Here for OB/FU. Reports Good FM.   Pt given order to schedule appt for repeat U/S for growth.  Good # [phone]  Pt states still having some nausea.   Pharmacy verified.   Pt given 1 HR GTT, CBC and RPR lab slip today along with instructions.

## 2019-10-08 ENCOUNTER — HOSPITAL ENCOUNTER (OUTPATIENT)
Dept: LAB | Facility: MEDICAL CENTER | Age: 24
End: 2019-10-08
Attending: NURSE PRACTITIONER
Payer: MEDICAID

## 2019-10-08 DIAGNOSIS — O09.92 ENCOUNTER FOR SUPERVISION OF HIGH RISK PREGNANCY IN SECOND TRIMESTER, ANTEPARTUM: ICD-10-CM

## 2019-10-08 LAB
ERYTHROCYTE [DISTWIDTH] IN BLOOD BY AUTOMATED COUNT: 43.5 FL (ref 35.9–50)
GLUCOSE 1H P 50 G GLC PO SERPL-MCNC: 127 MG/DL (ref 70–139)
HCT VFR BLD AUTO: 37.6 % (ref 37–47)
HGB BLD-MCNC: 11.5 G/DL (ref 12–16)
MCH RBC QN AUTO: 25.6 PG (ref 27–33)
MCHC RBC AUTO-ENTMCNC: 30.6 G/DL (ref 33.6–35)
MCV RBC AUTO: 83.6 FL (ref 81.4–97.8)
PLATELET # BLD AUTO: 189 K/UL (ref 164–446)
PMV BLD AUTO: 10.8 FL (ref 9–12.9)
RBC # BLD AUTO: 4.5 M/UL (ref 4.2–5.4)
TREPONEMA PALLIDUM IGG+IGM AB [PRESENCE] IN SERUM OR PLASMA BY IMMUNOASSAY: NON REACTIVE
WBC # BLD AUTO: 8.4 K/UL (ref 4.8–10.8)

## 2019-10-08 PROCEDURE — 85027 COMPLETE CBC AUTOMATED: CPT

## 2019-10-08 PROCEDURE — 36415 COLL VENOUS BLD VENIPUNCTURE: CPT

## 2019-10-08 PROCEDURE — 82950 GLUCOSE TEST: CPT

## 2019-10-08 PROCEDURE — 86780 TREPONEMA PALLIDUM: CPT

## 2019-10-10 ENCOUNTER — ROUTINE PRENATAL (OUTPATIENT)
Dept: OBGYN | Facility: CLINIC | Age: 24
End: 2019-10-10
Payer: MEDICAID

## 2019-10-10 ENCOUNTER — APPOINTMENT (OUTPATIENT)
Dept: RADIOLOGY | Facility: IMAGING CENTER | Age: 24
End: 2019-10-10
Attending: NURSE PRACTITIONER
Payer: MEDICAID

## 2019-10-10 VITALS — WEIGHT: 206 LBS | BODY MASS INDEX: 33.25 KG/M2

## 2019-10-10 DIAGNOSIS — O40.9XX0 POLYHYDRAMNIOS AFFECTING PREGNANCY: ICD-10-CM

## 2019-10-10 DIAGNOSIS — Z34.80 SUPERVISION OF OTHER NORMAL PREGNANCY, ANTEPARTUM: ICD-10-CM

## 2019-10-10 DIAGNOSIS — Z34.80 SUPERVISION OF OTHER NORMAL PREGNANCY, ANTEPARTUM: Primary | ICD-10-CM

## 2019-10-10 PROCEDURE — 90040 PR PRENATAL FOLLOW UP: CPT | Performed by: NURSE PRACTITIONER

## 2019-10-10 PROCEDURE — 76816 OB US FOLLOW-UP PER FETUS: CPT | Performed by: OBSTETRICS & GYNECOLOGY

## 2019-10-10 NOTE — PROGRESS NOTES
OB follow up   + fetal movement.  No VB, LOF or UC's.  Wt:206       BP:  Phone #  [phone]  Preferred pharmacy confirmed.   [id number]  Tdap declined

## 2019-10-10 NOTE — PROGRESS NOTES
S) Pt is a 24 y.o.   at 28w3d  gestation. Routine prenatal care today. Pt without concerns today.    Fetal movement Normal  Cramping no  VB no  LOF no   Denies dysuria. Generally feels well today. Good self-care activities identified. Denies headaches, swelling, visual changes, or epigastric pain .     O) There were no vitals taken for this visit.        Labs: WNL       PNL: WNL       GCT: 127       AFP: Not Examined       GBS: N/A       Pertinent ultrasound - 19  MELCHOR 24.54, survey WNL, c/w prev dating           A) IUP at 28w3d       S=D         Patient Active Problem List    Diagnosis Date Noted   • Polyhydramnios affecting pregnancy 2019   • Supervision of other normal pregnancy 07/15/2019          SVE: deferred         TDAP: no       FLU: no        BTL: no       : no       C/S Consent: no       IOL or C/S scheduled: no       LAST PAP:  negative         P) s/s ptl vs general discomforts. Fetal movements reviewed. General ed and anticipatory guidance. Nutrition/exercise/vitamin. Plans breast Plans pp contraception- unsure  Continue PNV.   [id number] interpretor used  Reviewed results of US.  MELCHOR 24, will repeat in 3-4 weeks prior to deciding whether to do NSTs.    RTC 2 weeks or PRN.

## 2019-10-10 NOTE — LETTER
Cuente los Movimientos de sawant Bebé  Otro paso importante para la amy de sawant bebé    Merari Mcghee     [facility]            Dept: [phone]    ¿Cuántas semanas tiene de embarazo? 28w3d    Fecha cuando tiene que comenzar a contar el movimiento: 10/10/2019                  [place] debe usar ariel diagrama    Manish manera en que sawant doctor puede controlar a amy de sawant bebé es sabiendo cuantas veces se mueve sawant bebé en el útero, o por medio de las “pataditas”.  Usted podrá ayudarle a sawant médico al usar cada día el siguiente diagrama.    Cada día, usted debe prestar atención a cuantas horas le lleva a sawant bebé moverse 10 veces.  Comience a contar en la mañana, lo antes posible después de haberse levantado.    · Primeramente, escriba la hora en que se mueve sawant bebé, hasta llegar a 10 veces.  · Colóquele un check o palomita a cada cuadrito cada vez que sawant bebé se mueva hasta que complete 10 veces.  · Escriba la hora cuando termine de contar 10 veces en la última columna.  · Sume el total del tiempo que le llevó contar los 10 movimientos.  · Finalmente, complete el cuadrito de cuantas horas le llevó hacerlo.    Después de kathi contado los 10 movimientos, ya no tendrá que contar los demás movimientos por el adryan del día.  A la mañana siguiente, comience a contar de nuevo cuantas veces se mueve el bebé desde el momento en que se levante.    ¿Qué tendría que considerarse un “movimiento”?  Es difícil de decirlo porque es distinto de manish madre a otra, y de un embarazo a otro.  Lo importante es que cuente el movimiento de la misma manera sandip el transcurso de sawant embarazo.  Si tiene preguntas adicionales, pregúntele a sawant doctor.    ¡Cuente cuidadosamente cada día!     MUESTRA:  Semana 28    ¿Cuántas horas le ha llevado sentir 10 movimientos?        Hora de Inicio     1     2     3     4     5     6     7     8     9     10   Hora de Finlizar   Rose. 8:20 ·  ·  ·  ·  ·  ·  ·  ·  ·  ·  11:40   Mar.               Mié.                Jue.               Vie.               Sáb.               Dom.                 IMPORTANTE:  Usted debe contactar a sawant doctor si le lleva más de 2 horas sentir 10 movimientos de sawant bebé.    Cada mañana, escriba la hora de inicio y comience a contar los movimientos de sawant bebé.  Hágalo colocándole un check o palomita a cada cuadrito cada vez que sienta un movimiento de sawant bebé.  Cuando haya sentido 10 “pataditas”, escriba la hora en que terminó de contar en la última columna.  Luego, complete en la cajita (arriba de la aristeo de check o palomita) el número total de horas que le llevó hacerlo.  Asegúrese de leer completamente las instrucciones en la página anterior.

## 2019-10-11 ENCOUNTER — DATING (OUTPATIENT)
Dept: OBGYN | Facility: CLINIC | Age: 24
End: 2019-10-11

## 2019-10-11 DIAGNOSIS — O40.9XX0 POLYHYDRAMNIOS AFFECTING PREGNANCY: ICD-10-CM

## 2019-10-24 ENCOUNTER — ROUTINE PRENATAL (OUTPATIENT)
Dept: OBGYN | Facility: CLINIC | Age: 24
End: 2019-10-24
Payer: MEDICAID

## 2019-10-24 VITALS — WEIGHT: 206 LBS | SYSTOLIC BLOOD PRESSURE: 99 MMHG | BODY MASS INDEX: 33.25 KG/M2 | DIASTOLIC BLOOD PRESSURE: 70 MMHG

## 2019-10-24 DIAGNOSIS — Z34.80 SUPERVISION OF OTHER NORMAL PREGNANCY, ANTEPARTUM: Primary | ICD-10-CM

## 2019-10-24 PROCEDURE — 90040 PR PRENATAL FOLLOW UP: CPT | Performed by: NURSE PRACTITIONER

## 2019-10-24 NOTE — PROGRESS NOTES
OB follow up   + fetal movement.  No VB, LOF or UC's.  Wt:206       BP:99/70  Phone #  [phone]  Preferred pharmacy confirmed.  Interpretor # [id number]

## 2019-10-24 NOTE — PROGRESS NOTES
S) Pt is a 24 y.o.   at 30w3d  gestation. Routine prenatal care today. Pt without concerns today.    Fetal movement Normal  Cramping no  VB no  LOF no   Denies dysuria. Generally feels well today. Good self-care activities identified. Denies headaches, swelling, visual changes, or epigastric pain .     O) BP (!) 99/70   Wt 93.4 kg (206 lb)         Labs: WNL       PNL:WNL       GCT:127       AFP: Not Examined       GBS: N/A       Pertinent ultrasound - 19  MELCHOR 24.54, survey WNL, c/w prev dating           A) IUP at 30w3d       S=D         Patient Active Problem List    Diagnosis Date Noted   • Polyhydramnios affecting pregnancy 2019   • Supervision of other normal pregnancy 07/15/2019          SVE: deferred         TDAP: no       FLU: no        BTL: no       : no       C/S Consent: no       IOL or C/S scheduled: no       LAST PAP: 2017 negative         P) s/s ptl vs general discomforts. Fetal movements reviewed. General ed and anticipatory guidance. Nutrition/exercise/vitamin. Plans breast Plans pp contraception- unsure  Continue PNV.   Interpretor: [id number]  Has US for MELCHOR/Growth   RTC 2 weeks or PRN

## 2019-11-07 ENCOUNTER — APPOINTMENT (OUTPATIENT)
Dept: RADIOLOGY | Facility: IMAGING CENTER | Age: 24
End: 2019-11-07
Attending: NURSE PRACTITIONER
Payer: MEDICAID

## 2019-11-07 ENCOUNTER — ROUTINE PRENATAL (OUTPATIENT)
Dept: OBGYN | Facility: CLINIC | Age: 24
End: 2019-11-07
Payer: MEDICAID

## 2019-11-07 VITALS — DIASTOLIC BLOOD PRESSURE: 82 MMHG | WEIGHT: 206 LBS | BODY MASS INDEX: 33.25 KG/M2 | SYSTOLIC BLOOD PRESSURE: 126 MMHG

## 2019-11-07 DIAGNOSIS — Z34.80 SUPERVISION OF OTHER NORMAL PREGNANCY, ANTEPARTUM: Primary | ICD-10-CM

## 2019-11-07 DIAGNOSIS — O40.9XX0 POLYHYDRAMNIOS AFFECTING PREGNANCY: ICD-10-CM

## 2019-11-07 PROCEDURE — 90040 PR PRENATAL FOLLOW UP: CPT | Performed by: NURSE PRACTITIONER

## 2019-11-07 PROCEDURE — 76816 OB US FOLLOW-UP PER FETUS: CPT | Mod: TC | Performed by: OBSTETRICS & GYNECOLOGY

## 2019-11-07 NOTE — PROGRESS NOTES
S) Pt is a 24 y.o.   at 32w3d  gestation. Routine prenatal care today. Pt without problems or concerns today.    Fetal movement Normal  Cramping no  VB no  LOF no   Denies dysuria. Generally feels well today. Good self-care activities identified. Denies headaches, swelling, visual changes, or epigastric pain .     O) There were no vitals taken for this visit.        Labs: WNL       PNL: WNL       GCT: 127       AFP: not examined       GBS: N/A       Pertinent ultrasound - 19  MELCHOR 24.54, survey WNL, c/w prev dating  19 MELCHOR 23.38, growth WNL           A) IUP at 32w3d       S=D         Patient Active Problem List    Diagnosis Date Noted   • Polyhydramnios affecting pregnancy 2019   • Supervision of other normal pregnancy 07/15/2019          SVE: deferred         TDAP: no       FLU: no        BTL: no       : no       C/S Consent: no       IOL or C/S scheduled: no       LAST PAP: 2017 negative         P) s/s ptl vs general discomforts. Fetal movements reviewed. General ed and anticipatory guidance. Nutrition/exercise/vitamin. Plans breast Plans pp contraception- unsure  Continue PNV.   Discussed normal growth and MELCHOR today.   RTC 2 weeks or PRN.

## 2019-11-08 NOTE — PROGRESS NOTES
Pt here today for OB follow up  Reports +FM  WT: 206 lb  BP:126/82  Pt states no complaints or concerns today  Good # [phone]

## 2019-11-21 ENCOUNTER — ROUTINE PRENATAL (OUTPATIENT)
Dept: OBGYN | Facility: CLINIC | Age: 24
End: 2019-11-21
Payer: MEDICAID

## 2019-11-21 VITALS — DIASTOLIC BLOOD PRESSURE: 62 MMHG | BODY MASS INDEX: 33.41 KG/M2 | SYSTOLIC BLOOD PRESSURE: 108 MMHG | WEIGHT: 207 LBS

## 2019-11-21 DIAGNOSIS — Z34.80 SUPERVISION OF OTHER NORMAL PREGNANCY, ANTEPARTUM: Primary | ICD-10-CM

## 2019-11-21 PROCEDURE — 90040 PR PRENATAL FOLLOW UP: CPT | Performed by: NURSE PRACTITIONER

## 2019-11-21 NOTE — PROGRESS NOTES
Pt here today for OB follow up  Pt states no complaints  Reports +FM  Good # [phone]  Pharmacy Confirmed.  Chaperone offered and declined

## 2019-11-21 NOTE — PROGRESS NOTES
S) Pt is a 24 y.o.   at 34w3d  gestation. Routine prenatal care today. Pt without concerns today.    Fetal movement Normal  Cramping no  VB no  LOF no   Denies dysuria. Generally feels well today. Good self-care activities identified. Denies headaches, swelling, visual changes, or epigastric pain .     O) There were no vitals taken for this visit.        Labs: WNL       PNL: WNL       GCT: 127       AFP: Not Examined       GBS: N/A       Pertinent ultrasound - 19  MELCHOR 24.54, survey WNL, c/w prev dating  19 MELCHOR 23.38, growth WNL           A) IUP at 34w3d       S=D         Patient Active Problem List    Diagnosis Date Noted   • Polyhydramnios affecting pregnancy 2019   • Supervision of other normal pregnancy 07/15/2019          SVE: deferred         TDAP: no       FLU: no        BTL: no       : no       C/S Consent: no       IOL or C/S scheduled: no       LAST PAP: 2017 negative         P) s/s ptl vs general discomforts. Fetal movements reviewed. General ed and anticipatory guidance. Nutrition/exercise/vitamin. Plans breast Plans pp contraception- unsure  Continue PNV.   Interpretor: [id number]  Discussed GBS next visit  RTC 2 weeks or PRN.

## 2019-11-25 ENCOUNTER — TELEPHONE (OUTPATIENT)
Dept: OBGYN | Facility: CLINIC | Age: 24
End: 2019-11-25

## 2019-12-05 ENCOUNTER — HOSPITAL ENCOUNTER (OUTPATIENT)
Facility: MEDICAL CENTER | Age: 24
End: 2019-12-05
Attending: NURSE PRACTITIONER
Payer: MEDICAID

## 2019-12-05 ENCOUNTER — ROUTINE PRENATAL (OUTPATIENT)
Dept: OBGYN | Facility: CLINIC | Age: 24
End: 2019-12-05
Payer: MEDICAID

## 2019-12-05 VITALS — BODY MASS INDEX: 34.06 KG/M2 | DIASTOLIC BLOOD PRESSURE: 74 MMHG | SYSTOLIC BLOOD PRESSURE: 122 MMHG | WEIGHT: 211 LBS

## 2019-12-05 DIAGNOSIS — Z3A.36 36 WEEKS GESTATION OF PREGNANCY: ICD-10-CM

## 2019-12-05 PROCEDURE — 87150 DNA/RNA AMPLIFIED PROBE: CPT

## 2019-12-05 PROCEDURE — 90040 PR PRENATAL FOLLOW UP: CPT | Performed by: NURSE PRACTITIONER

## 2019-12-05 PROCEDURE — 87081 CULTURE SCREEN ONLY: CPT

## 2019-12-05 NOTE — PROGRESS NOTES
SUBJECTIVE:  Pt is a 24 y.o.   at 36w3d  gestation. Presents today for follow-up prenatal care. Reports no issues at this time.  Reports good fetal movement. Denies regular cramping/contractions, bleeding or leaking of fluid. Denies dysuria, headaches, N/V. Generally feels well today except more pressure in pelvis.     OBJECTIVE:  - See prenatal vitals flow  -   Vitals:    19 1022   BP: 122/74   Weight: 95.7 kg (211 lb)                 ASSESSMENT:   - IUP at 36w3d    - S=D   -   Patient Active Problem List    Diagnosis Date Noted   • Supervision of other normal pregnancy 07/15/2019         PLAN:  - S/sx pregnancy and labor warning signs vs general discomforts discussed  - Fetal movements and/or kick counts reviewed   - Adequate hydration reinforced  - Nutrition/exercise/vitamin education; continue PNV  - GBS collected along with EPDS screening   - Anticipatory guidance given  - RTC in 1 weeks for follow-up prenatal care

## 2019-12-05 NOTE — PROGRESS NOTES
Pt here today for OB follow up  Pt states doing well  Reports +FM  Good # [phone]  Pharmacy Confirmed w/ pt  GBS today  [place] depression scale completed and scanned in media

## 2019-12-07 LAB — GP B STREP DNA SPEC QL NAA+PROBE: NEGATIVE

## 2019-12-13 ENCOUNTER — ROUTINE PRENATAL (OUTPATIENT)
Dept: OBGYN | Facility: CLINIC | Age: 24
End: 2019-12-13
Payer: MEDICAID

## 2019-12-13 VITALS — SYSTOLIC BLOOD PRESSURE: 106 MMHG | WEIGHT: 214 LBS | DIASTOLIC BLOOD PRESSURE: 68 MMHG | BODY MASS INDEX: 34.54 KG/M2

## 2019-12-13 DIAGNOSIS — Z34.83 ENCOUNTER FOR SUPERVISION OF OTHER NORMAL PREGNANCY IN THIRD TRIMESTER: Primary | ICD-10-CM

## 2019-12-13 PROCEDURE — 90040 PR PRENATAL FOLLOW UP: CPT | Performed by: NURSE PRACTITIONER

## 2019-12-13 NOTE — PROGRESS NOTES
Pt here today for OB follow up  Reports +FM  WT: 214 lb  BP: 106/68  Pt states no complaints or concerns  Good # [phone]

## 2019-12-13 NOTE — PROGRESS NOTES
S:  Pt is  at 37w4d here for routine OB follow up.  No Concerns today.  Reports good FM.  Denies VB, LOF, RUCs, or vaginal DC.     O:  Please see above vitals.        FHTs: 135        Fundal ht: 37 cm        Fetal position: vertex        SVE: deferred        GBS negative  -- reviewed w pt.      A:  IUP at 37w4d  Patient Active Problem List    Diagnosis Date Noted   • Supervision of other normal pregnancy 07/15/2019       P:  1.  Anticipatory guidance given         2.  Labor precautions given.  Instructions given on where to go.  Pt receptive to education.         3.  D/w pt plan for VE and IOL referral at 38 wk visit.        4.  Questions answered.         5.  Encouraged adequate water intake, walking 30-60 min daily, pelvic rocking, birthing ball       6.  F/u 1wk

## 2019-12-17 ENCOUNTER — ROUTINE PRENATAL (OUTPATIENT)
Dept: OBGYN | Facility: CLINIC | Age: 24
End: 2019-12-17
Payer: MEDICAID

## 2019-12-17 VITALS — BODY MASS INDEX: 34.86 KG/M2 | DIASTOLIC BLOOD PRESSURE: 70 MMHG | SYSTOLIC BLOOD PRESSURE: 112 MMHG | WEIGHT: 216 LBS

## 2019-12-17 DIAGNOSIS — Z34.80 SUPERVISION OF OTHER NORMAL PREGNANCY, ANTEPARTUM: Primary | ICD-10-CM

## 2019-12-17 PROCEDURE — 90040 PR PRENATAL FOLLOW UP: CPT | Performed by: NURSE PRACTITIONER

## 2019-12-17 NOTE — PROGRESS NOTES
Pt here today for OB follow up  Reports +FM  WT: 216 lb  BP: 112/70  Pt states no complaints or concerns today  Good #  [phone]

## 2019-12-17 NOTE — PROGRESS NOTES
S) Pt is a 24 y.o.   at 38w1d  gestation. Routine prenatal care today. No complaints today. SVE and IOL referral. GBS negative. Labor precautions reviewed, all questions answered.    Fetal movement Normal  Cramping no  VB no  LOF no   Denies dysuria. Generally feels well today. Good self-care activities identified. Denies headaches, swelling, visual changes, or epigastric pain .     O) /70   Wt 98 kg (216 lb)         Labs:       PNL: WNL       GCT: 127        AFP: Not Examined       GBS: negative       Pertinent ultrasound -        19- Survey WNL, MELCHOR 24.54cm, c/w prev dating.  10/10/19- Recheck MELCHOR and growth- Survey WNL, MELCHOR 24.22cm, c/w prev dating.  19- Growth scan- No abnormalities noted, MELCHOR 23.38cm, c/w prev dating.    A) IUP at 38w1d       S=D         Patient Active Problem List    Diagnosis Date Noted   • Supervision of other normal pregnancy 07/15/2019          SVE: 1-240/-3       Chaperone offered: yes         TDAP: no       FLU: no        BTL: no       : n/a       C/S Consent: n/a       IOL or C/S scheduled: no- referral placed today       LAST PAP: 2020- negative         P) s/s ptl vs general discomforts. Fetal movements reviewed. General ed and anticipatory guidance. Nutrition/exercise/vitamin. Plans breast Plans pp contraception- unsure  Continue PNV.

## 2019-12-20 ENCOUNTER — ANESTHESIA EVENT (OUTPATIENT)
Dept: OBGYN | Facility: MEDICAL CENTER | Age: 24
End: 2019-12-20
Payer: MEDICAID

## 2019-12-20 ENCOUNTER — ANESTHESIA (OUTPATIENT)
Dept: OBGYN | Facility: MEDICAL CENTER | Age: 24
End: 2019-12-20
Payer: MEDICAID

## 2019-12-20 ENCOUNTER — TELEPHONE (OUTPATIENT)
Dept: OBGYN | Facility: CLINIC | Age: 24
End: 2019-12-20

## 2019-12-20 ENCOUNTER — HOSPITAL ENCOUNTER (INPATIENT)
Facility: MEDICAL CENTER | Age: 24
LOS: 3 days | End: 2019-12-23
Attending: OBSTETRICS & GYNECOLOGY | Admitting: OBSTETRICS & GYNECOLOGY
Payer: MEDICAID

## 2019-12-20 LAB
BASOPHILS # BLD AUTO: 0.4 % (ref 0–1.8)
BASOPHILS # BLD: 0.04 K/UL (ref 0–0.12)
EOSINOPHIL # BLD AUTO: 0.09 K/UL (ref 0–0.51)
EOSINOPHIL NFR BLD: 1 % (ref 0–6.9)
ERYTHROCYTE [DISTWIDTH] IN BLOOD BY AUTOMATED COUNT: 43.2 FL (ref 35.9–50)
ERYTHROCYTE [DISTWIDTH] IN BLOOD BY AUTOMATED COUNT: 43.4 FL (ref 35.9–50)
HCT VFR BLD AUTO: 27.7 % (ref 37–47)
HCT VFR BLD AUTO: 37.2 % (ref 37–47)
HGB BLD-MCNC: 12.1 G/DL (ref 12–16)
HGB BLD-MCNC: 8.9 G/DL (ref 12–16)
HOLDING TUBE BB 8507: NORMAL
IMM GRANULOCYTES # BLD AUTO: 0.06 K/UL (ref 0–0.11)
IMM GRANULOCYTES NFR BLD AUTO: 0.7 % (ref 0–0.9)
LYMPHOCYTES # BLD AUTO: 1.84 K/UL (ref 1–4.8)
LYMPHOCYTES NFR BLD: 20.5 % (ref 22–41)
MCH RBC QN AUTO: 26.3 PG (ref 27–33)
MCH RBC QN AUTO: 26.6 PG (ref 27–33)
MCHC RBC AUTO-ENTMCNC: 32.5 G/DL (ref 33.6–35)
MCHC RBC AUTO-ENTMCNC: 33.5 G/DL (ref 33.6–35)
MCV RBC AUTO: 79.6 FL (ref 81.4–97.8)
MCV RBC AUTO: 80.9 FL (ref 81.4–97.8)
MONOCYTES # BLD AUTO: 0.6 K/UL (ref 0–0.85)
MONOCYTES NFR BLD AUTO: 6.7 % (ref 0–13.4)
NEUTROPHILS # BLD AUTO: 6.35 K/UL (ref 2–7.15)
NEUTROPHILS NFR BLD: 70.7 % (ref 44–72)
NRBC # BLD AUTO: 0 K/UL
NRBC BLD-RTO: 0 /100 WBC
PLATELET # BLD AUTO: 142 K/UL (ref 164–446)
PLATELET # BLD AUTO: 164 K/UL (ref 164–446)
PMV BLD AUTO: 11.5 FL (ref 9–12.9)
PMV BLD AUTO: 12.4 FL (ref 9–12.9)
RBC # BLD AUTO: 3.34 M/UL (ref 4.2–5.4)
RBC # BLD AUTO: 4.6 M/UL (ref 4.2–5.4)
WBC # BLD AUTO: 9 K/UL (ref 4.8–10.8)
WBC # BLD AUTO: 9.7 K/UL (ref 4.8–10.8)

## 2019-12-20 PROCEDURE — 700101 HCHG RX REV CODE 250: Performed by: ANESTHESIOLOGY

## 2019-12-20 PROCEDURE — 304964 HCHG RECOVERY ROOM TIME 1HR: Performed by: OBSTETRICS & GYNECOLOGY

## 2019-12-20 PROCEDURE — 59514 CESAREAN DELIVERY ONLY: CPT | Mod: 80

## 2019-12-20 PROCEDURE — 59510 CESAREAN DELIVERY: CPT | Performed by: OBSTETRICS & GYNECOLOGY

## 2019-12-20 PROCEDURE — 700105 HCHG RX REV CODE 258

## 2019-12-20 PROCEDURE — 36415 COLL VENOUS BLD VENIPUNCTURE: CPT

## 2019-12-20 PROCEDURE — 770002 HCHG ROOM/CARE - OB PRIVATE (112)

## 2019-12-20 PROCEDURE — 700112 HCHG RX REV CODE 229: Performed by: OBSTETRICS & GYNECOLOGY

## 2019-12-20 PROCEDURE — A9270 NON-COVERED ITEM OR SERVICE: HCPCS | Performed by: OBSTETRICS & GYNECOLOGY

## 2019-12-20 PROCEDURE — 305385 HCHG SURGICAL SERVICES 1/4 HOUR: Performed by: OBSTETRICS & GYNECOLOGY

## 2019-12-20 PROCEDURE — 700102 HCHG RX REV CODE 250 W/ 637 OVERRIDE(OP): Performed by: ANESTHESIOLOGY

## 2019-12-20 PROCEDURE — 304966 HCHG RECOVERY SVSC TIME ADDL 1/2 HR: Performed by: OBSTETRICS & GYNECOLOGY

## 2019-12-20 PROCEDURE — 306828 HCHG ANES-TIME GENERAL: Performed by: OBSTETRICS & GYNECOLOGY

## 2019-12-20 PROCEDURE — 700102 HCHG RX REV CODE 250 W/ 637 OVERRIDE(OP): Performed by: OBSTETRICS & GYNECOLOGY

## 2019-12-20 PROCEDURE — 700111 HCHG RX REV CODE 636 W/ 250 OVERRIDE (IP): Performed by: ANESTHESIOLOGY

## 2019-12-20 PROCEDURE — 700105 HCHG RX REV CODE 258: Performed by: ANESTHESIOLOGY

## 2019-12-20 PROCEDURE — 59200 INSERT CERVICAL DILATOR: CPT

## 2019-12-20 PROCEDURE — 700105 HCHG RX REV CODE 258: Performed by: OBSTETRICS & GYNECOLOGY

## 2019-12-20 PROCEDURE — 59514 CESAREAN DELIVERY ONLY: CPT

## 2019-12-20 PROCEDURE — 700111 HCHG RX REV CODE 636 W/ 250 OVERRIDE (IP)

## 2019-12-20 PROCEDURE — 85027 COMPLETE CBC AUTOMATED: CPT

## 2019-12-20 PROCEDURE — A9270 NON-COVERED ITEM OR SERVICE: HCPCS | Performed by: ANESTHESIOLOGY

## 2019-12-20 PROCEDURE — 85025 COMPLETE CBC W/AUTO DIFF WBC: CPT

## 2019-12-20 RX ORDER — OXYCODONE HCL 5 MG/5 ML
10 SOLUTION, ORAL ORAL
Status: CANCELLED | OUTPATIENT
Start: 2019-12-20

## 2019-12-20 RX ORDER — LABETALOL HYDROCHLORIDE 5 MG/ML
5 INJECTION, SOLUTION INTRAVENOUS
Status: CANCELLED | OUTPATIENT
Start: 2019-12-20

## 2019-12-20 RX ORDER — KETOROLAC TROMETHAMINE 30 MG/ML
30 INJECTION, SOLUTION INTRAMUSCULAR; INTRAVENOUS EVERY 6 HOURS
Status: COMPLETED | OUTPATIENT
Start: 2019-12-20 | End: 2019-12-21

## 2019-12-20 RX ORDER — SODIUM CHLORIDE, SODIUM GLUCONATE, SODIUM ACETATE, POTASSIUM CHLORIDE AND MAGNESIUM CHLORIDE 526; 502; 368; 37; 30 MG/100ML; MG/100ML; MG/100ML; MG/100ML; MG/100ML
1500 INJECTION, SOLUTION INTRAVENOUS ONCE
Status: COMPLETED | OUTPATIENT
Start: 2019-12-20 | End: 2019-12-20

## 2019-12-20 RX ORDER — DIPHENHYDRAMINE HYDROCHLORIDE 50 MG/ML
12.5 INJECTION INTRAMUSCULAR; INTRAVENOUS EVERY 6 HOURS PRN
Status: DISCONTINUED | OUTPATIENT
Start: 2019-12-20 | End: 2019-12-21

## 2019-12-20 RX ORDER — MEPERIDINE HYDROCHLORIDE 25 MG/ML
12.5 INJECTION INTRAMUSCULAR; INTRAVENOUS; SUBCUTANEOUS
Status: CANCELLED | OUTPATIENT
Start: 2019-12-20

## 2019-12-20 RX ORDER — HYDRALAZINE HYDROCHLORIDE 20 MG/ML
5 INJECTION INTRAMUSCULAR; INTRAVENOUS
Status: CANCELLED | OUTPATIENT
Start: 2019-12-20

## 2019-12-20 RX ORDER — OXYCODONE HYDROCHLORIDE 5 MG/1
5 TABLET ORAL EVERY 4 HOURS PRN
Status: DISCONTINUED | OUTPATIENT
Start: 2019-12-20 | End: 2019-12-21

## 2019-12-20 RX ORDER — MISOPROSTOL 200 UG/1
800 TABLET ORAL
Status: DISCONTINUED | OUTPATIENT
Start: 2019-12-20 | End: 2019-12-23 | Stop reason: HOSPADM

## 2019-12-20 RX ORDER — HALOPERIDOL 5 MG/ML
1 INJECTION INTRAMUSCULAR
Status: CANCELLED | OUTPATIENT
Start: 2019-12-20

## 2019-12-20 RX ORDER — KETOROLAC TROMETHAMINE 30 MG/ML
INJECTION, SOLUTION INTRAMUSCULAR; INTRAVENOUS PRN
Status: DISCONTINUED | OUTPATIENT
Start: 2019-12-20 | End: 2019-12-24 | Stop reason: SURG

## 2019-12-20 RX ORDER — BUPIVACAINE HYDROCHLORIDE 7.5 MG/ML
INJECTION, SOLUTION INTRASPINAL
Status: COMPLETED | OUTPATIENT
Start: 2019-12-20 | End: 2019-12-20

## 2019-12-20 RX ORDER — SODIUM CHLORIDE, SODIUM GLUCONATE, SODIUM ACETATE, POTASSIUM CHLORIDE AND MAGNESIUM CHLORIDE 526; 502; 368; 37; 30 MG/100ML; MG/100ML; MG/100ML; MG/100ML; MG/100ML
INJECTION, SOLUTION INTRAVENOUS
Status: DISCONTINUED | OUTPATIENT
Start: 2019-12-20 | End: 2019-12-24 | Stop reason: SURG

## 2019-12-20 RX ORDER — ACETAMINOPHEN 500 MG
1000 TABLET ORAL EVERY 6 HOURS
Status: COMPLETED | OUTPATIENT
Start: 2019-12-20 | End: 2019-12-21

## 2019-12-20 RX ORDER — SODIUM CHLORIDE, SODIUM LACTATE, POTASSIUM CHLORIDE, CALCIUM CHLORIDE 600; 310; 30; 20 MG/100ML; MG/100ML; MG/100ML; MG/100ML
INJECTION, SOLUTION INTRAVENOUS
Status: ACTIVE
Start: 2019-12-20 | End: 2019-12-20

## 2019-12-20 RX ORDER — OXYCODONE HCL 5 MG/5 ML
5 SOLUTION, ORAL ORAL
Status: CANCELLED | OUTPATIENT
Start: 2019-12-20

## 2019-12-20 RX ORDER — MORPHINE SULFATE 2 MG/ML
INJECTION, SOLUTION INTRAMUSCULAR; INTRAVENOUS
Status: COMPLETED | OUTPATIENT
Start: 2019-12-20 | End: 2019-12-20

## 2019-12-20 RX ORDER — METOCLOPRAMIDE HYDROCHLORIDE 5 MG/ML
10 INJECTION INTRAMUSCULAR; INTRAVENOUS ONCE
Status: COMPLETED | OUTPATIENT
Start: 2019-12-20 | End: 2019-12-20

## 2019-12-20 RX ORDER — HYDROMORPHONE HYDROCHLORIDE 1 MG/ML
0.2 INJECTION, SOLUTION INTRAMUSCULAR; INTRAVENOUS; SUBCUTANEOUS
Status: DISCONTINUED | OUTPATIENT
Start: 2019-12-20 | End: 2019-12-21

## 2019-12-20 RX ORDER — KETOROLAC TROMETHAMINE 30 MG/ML
30 INJECTION, SOLUTION INTRAMUSCULAR; INTRAVENOUS EVERY 6 HOURS
Status: CANCELLED | OUTPATIENT
Start: 2019-12-20 | End: 2019-12-21

## 2019-12-20 RX ORDER — SODIUM CHLORIDE, SODIUM GLUCONATE, SODIUM ACETATE, POTASSIUM CHLORIDE AND MAGNESIUM CHLORIDE 526; 502; 368; 37; 30 MG/100ML; MG/100ML; MG/100ML; MG/100ML; MG/100ML
INJECTION, SOLUTION INTRAVENOUS
Status: COMPLETED
Start: 2019-12-20 | End: 2019-12-20

## 2019-12-20 RX ORDER — DIPHENHYDRAMINE HYDROCHLORIDE 50 MG/ML
12.5 INJECTION INTRAMUSCULAR; INTRAVENOUS
Status: CANCELLED | OUTPATIENT
Start: 2019-12-20

## 2019-12-20 RX ORDER — OXYCODONE HYDROCHLORIDE 10 MG/1
10 TABLET ORAL EVERY 4 HOURS PRN
Status: DISCONTINUED | OUTPATIENT
Start: 2019-12-20 | End: 2019-12-21

## 2019-12-20 RX ORDER — HYDROMORPHONE HYDROCHLORIDE 1 MG/ML
0.4 INJECTION, SOLUTION INTRAMUSCULAR; INTRAVENOUS; SUBCUTANEOUS
Status: CANCELLED | OUTPATIENT
Start: 2019-12-20

## 2019-12-20 RX ORDER — HYDROMORPHONE HYDROCHLORIDE 1 MG/ML
0.2 INJECTION, SOLUTION INTRAMUSCULAR; INTRAVENOUS; SUBCUTANEOUS
Status: CANCELLED | OUTPATIENT
Start: 2019-12-20

## 2019-12-20 RX ORDER — VITAMIN A ACETATE, BETA CAROTENE, ASCORBIC ACID, CHOLECALCIFEROL, .ALPHA.-TOCOPHEROL ACETATE, DL-, THIAMINE MONONITRATE, RIBOFLAVIN, NIACINAMIDE, PYRIDOXINE HYDROCHLORIDE, FOLIC ACID, CYANOCOBALAMIN, CALCIUM CARBONATE, FERROUS FUMARATE, ZINC OXIDE, CUPRIC OXIDE 3080; 12; 120; 400; 1; 1.84; 3; 20; 22; 920; 25; 200; 27; 10; 2 [IU]/1; UG/1; MG/1; [IU]/1; MG/1; MG/1; MG/1; MG/1; MG/1; [IU]/1; MG/1; MG/1; MG/1; MG/1; MG/1
1 TABLET, FILM COATED ORAL EVERY MORNING
Status: DISCONTINUED | OUTPATIENT
Start: 2019-12-20 | End: 2019-12-23 | Stop reason: HOSPADM

## 2019-12-20 RX ORDER — DOCUSATE SODIUM 100 MG/1
100 CAPSULE, LIQUID FILLED ORAL 2 TIMES DAILY PRN
Status: DISCONTINUED | OUTPATIENT
Start: 2019-12-20 | End: 2019-12-23 | Stop reason: HOSPADM

## 2019-12-20 RX ORDER — ONDANSETRON 2 MG/ML
4 INJECTION INTRAMUSCULAR; INTRAVENOUS EVERY 6 HOURS PRN
Status: DISCONTINUED | OUTPATIENT
Start: 2019-12-20 | End: 2019-12-21

## 2019-12-20 RX ORDER — SODIUM CHLORIDE, SODIUM LACTATE, POTASSIUM CHLORIDE, CALCIUM CHLORIDE 600; 310; 30; 20 MG/100ML; MG/100ML; MG/100ML; MG/100ML
INJECTION, SOLUTION INTRAVENOUS PRN
Status: DISCONTINUED | OUTPATIENT
Start: 2019-12-20 | End: 2019-12-23 | Stop reason: HOSPADM

## 2019-12-20 RX ORDER — HYDROMORPHONE HYDROCHLORIDE 1 MG/ML
0.4 INJECTION, SOLUTION INTRAMUSCULAR; INTRAVENOUS; SUBCUTANEOUS
Status: DISCONTINUED | OUTPATIENT
Start: 2019-12-20 | End: 2019-12-21

## 2019-12-20 RX ORDER — PHENYLEPHRINE HCL IN 0.9% NACL 0.5 MG/5ML
SYRINGE (ML) INTRAVENOUS PRN
Status: DISCONTINUED | OUTPATIENT
Start: 2019-12-20 | End: 2019-12-24 | Stop reason: SURG

## 2019-12-20 RX ORDER — CEFAZOLIN SODIUM 1 G/3ML
INJECTION, POWDER, FOR SOLUTION INTRAMUSCULAR; INTRAVENOUS PRN
Status: DISCONTINUED | OUTPATIENT
Start: 2019-12-20 | End: 2019-12-24 | Stop reason: SURG

## 2019-12-20 RX ORDER — MIDAZOLAM HYDROCHLORIDE 1 MG/ML
1 INJECTION INTRAMUSCULAR; INTRAVENOUS
Status: CANCELLED | OUTPATIENT
Start: 2019-12-20

## 2019-12-20 RX ORDER — SIMETHICONE 80 MG
80 TABLET,CHEWABLE ORAL 4 TIMES DAILY PRN
Status: DISCONTINUED | OUTPATIENT
Start: 2019-12-20 | End: 2019-12-23 | Stop reason: HOSPADM

## 2019-12-20 RX ORDER — ONDANSETRON 2 MG/ML
4 INJECTION INTRAMUSCULAR; INTRAVENOUS
Status: CANCELLED | OUTPATIENT
Start: 2019-12-20

## 2019-12-20 RX ORDER — OXYTOCIN 10 [USP'U]/ML
INJECTION, SOLUTION INTRAMUSCULAR; INTRAVENOUS PRN
Status: DISCONTINUED | OUTPATIENT
Start: 2019-12-20 | End: 2019-12-24 | Stop reason: SURG

## 2019-12-20 RX ORDER — HYDROMORPHONE HYDROCHLORIDE 1 MG/ML
0.1 INJECTION, SOLUTION INTRAMUSCULAR; INTRAVENOUS; SUBCUTANEOUS
Status: CANCELLED | OUTPATIENT
Start: 2019-12-20

## 2019-12-20 RX ORDER — CITRIC ACID/SODIUM CITRATE 334-500MG
30 SOLUTION, ORAL ORAL ONCE
Status: COMPLETED | OUTPATIENT
Start: 2019-12-20 | End: 2019-12-20

## 2019-12-20 RX ORDER — SODIUM CHLORIDE, SODIUM GLUCONATE, SODIUM ACETATE, POTASSIUM CHLORIDE AND MAGNESIUM CHLORIDE 526; 502; 368; 37; 30 MG/100ML; MG/100ML; MG/100ML; MG/100ML; MG/100ML
500 INJECTION, SOLUTION INTRAVENOUS CONTINUOUS
Status: CANCELLED | OUTPATIENT
Start: 2019-12-20

## 2019-12-20 RX ADMIN — KETOROLAC TROMETHAMINE 30 MG: 30 INJECTION, SOLUTION INTRAMUSCULAR; INTRAVENOUS at 14:28

## 2019-12-20 RX ADMIN — VITAMIN A, VITAMIN C, VITAMIN D-3, VITAMIN E, VITAMIN B-1, VITAMIN B-2, NIACIN, VITAMIN B-6, CALCIUM, IRON, ZINC, COPPER 1 TABLET: 4000; 120; 400; 22; 1.84; 3; 20; 10; 1; 12; 200; 27; 25; 2 TABLET ORAL at 08:09

## 2019-12-20 RX ADMIN — ACETAMINOPHEN 1000 MG: 500 TABLET ORAL at 21:15

## 2019-12-20 RX ADMIN — SODIUM CITRATE AND CITRIC ACID MONOHYDRATE 30 ML: 500; 334 SOLUTION ORAL at 02:51

## 2019-12-20 RX ADMIN — KETOROLAC TROMETHAMINE 30 MG: 30 INJECTION, SOLUTION INTRAMUSCULAR; INTRAVENOUS at 08:09

## 2019-12-20 RX ADMIN — OXYTOCIN 125 ML/HR: 10 INJECTION, SOLUTION INTRAMUSCULAR; INTRAVENOUS at 04:30

## 2019-12-20 RX ADMIN — SODIUM CHLORIDE, SODIUM GLUCONATE, SODIUM ACETATE, POTASSIUM CHLORIDE AND MAGNESIUM CHLORIDE 1500 ML: 526; 502; 368; 37; 30 INJECTION, SOLUTION INTRAVENOUS at 02:52

## 2019-12-20 RX ADMIN — METOCLOPRAMIDE 10 MG: 5 INJECTION, SOLUTION INTRAMUSCULAR; INTRAVENOUS at 02:52

## 2019-12-20 RX ADMIN — SODIUM CHLORIDE, SODIUM GLUCONATE, SODIUM ACETATE, POTASSIUM CHLORIDE AND MAGNESIUM CHLORIDE: 526; 502; 368; 37; 30 INJECTION, SOLUTION INTRAVENOUS at 03:00

## 2019-12-20 RX ADMIN — BUPIVACAINE HYDROCHLORIDE IN DEXTROSE 1.5 ML: 7.5 INJECTION, SOLUTION SUBARACHNOID at 03:03

## 2019-12-20 RX ADMIN — CEFAZOLIN 2 G: 330 INJECTION, POWDER, FOR SOLUTION INTRAMUSCULAR; INTRAVENOUS at 03:08

## 2019-12-20 RX ADMIN — ACETAMINOPHEN 1000 MG: 500 TABLET ORAL at 14:28

## 2019-12-20 RX ADMIN — OXYCODONE HYDROCHLORIDE 5 MG: 5 TABLET ORAL at 11:44

## 2019-12-20 RX ADMIN — MORPHINE SULFATE 0.15 MG: 2 INJECTION, SOLUTION INTRAMUSCULAR; INTRAVENOUS at 03:03

## 2019-12-20 RX ADMIN — METHYLENE BLUE: 5 INJECTION INTRAVENOUS at 04:00

## 2019-12-20 RX ADMIN — FAMOTIDINE 20 MG: 10 INJECTION INTRAVENOUS at 02:52

## 2019-12-20 RX ADMIN — Medication 100 MCG: at 03:15

## 2019-12-20 RX ADMIN — KETOROLAC TROMETHAMINE 30 MG: 30 INJECTION, SOLUTION INTRAMUSCULAR; INTRAVENOUS at 21:15

## 2019-12-20 RX ADMIN — KETOROLAC TROMETHAMINE 30 MG: 30 INJECTION, SOLUTION INTRAMUSCULAR at 04:09

## 2019-12-20 RX ADMIN — OXYTOCIN 30 UNITS: 10 INJECTION, SOLUTION INTRAMUSCULAR; INTRAVENOUS at 03:31

## 2019-12-20 RX ADMIN — FENTANYL CITRATE 15 MCG: 50 INJECTION, SOLUTION INTRAMUSCULAR; INTRAVENOUS at 03:03

## 2019-12-20 RX ADMIN — SIMETHICONE CHEW TAB 80 MG 80 MG: 80 TABLET ORAL at 21:15

## 2019-12-20 RX ADMIN — ACETAMINOPHEN 1000 MG: 500 TABLET ORAL at 08:08

## 2019-12-20 RX ADMIN — DOCUSATE SODIUM 100 MG: 100 CAPSULE, LIQUID FILLED ORAL at 21:15

## 2019-12-20 RX ADMIN — SODIUM CHLORIDE, POTASSIUM CHLORIDE, SODIUM LACTATE AND CALCIUM CHLORIDE 1000 ML: 600; 310; 30; 20 INJECTION, SOLUTION INTRAVENOUS at 19:21

## 2019-12-20 SDOH — ECONOMIC STABILITY: FOOD INSECURITY: WITHIN THE PAST 12 MONTHS, THE FOOD YOU BOUGHT JUST DIDN'T LAST AND YOU DIDN'T HAVE MONEY TO GET MORE.: NEVER TRUE

## 2019-12-20 SDOH — ECONOMIC STABILITY: TRANSPORTATION INSECURITY
IN THE PAST 12 MONTHS, HAS THE LACK OF TRANSPORTATION KEPT YOU FROM MEDICAL APPOINTMENTS OR FROM GETTING MEDICATIONS?: NO

## 2019-12-20 SDOH — ECONOMIC STABILITY: FOOD INSECURITY: WITHIN THE PAST 12 MONTHS, YOU WORRIED THAT YOUR FOOD WOULD RUN OUT BEFORE YOU GOT MONEY TO BUY MORE.: NEVER TRUE

## 2019-12-20 SDOH — ECONOMIC STABILITY: TRANSPORTATION INSECURITY
IN THE PAST 12 MONTHS, HAS LACK OF TRANSPORTATION KEPT YOU FROM MEETINGS, WORK, OR FROM GETTING THINGS NEEDED FOR DAILY LIVING?: NO

## 2019-12-20 ASSESSMENT — LIFESTYLE VARIABLES
DOES PATIENT WANT TO STOP DRINKING: NO
ALCOHOL_USE: NO
TOTAL SCORE: 0
TOTAL SCORE: 0
EVER_SMOKED: NEVER
TOTAL SCORE: 0
CONSUMPTION TOTAL: INCOMPLETE
EVER FELT BAD OR GUILTY ABOUT YOUR DRINKING: NO
HAVE YOU EVER FELT YOU SHOULD CUT DOWN ON YOUR DRINKING: NO
EVER HAD A DRINK FIRST THING IN THE MORNING TO STEADY YOUR NERVES TO GET RID OF A HANGOVER: NO
HAVE PEOPLE ANNOYED YOU BY CRITICIZING YOUR DRINKING: NO

## 2019-12-20 ASSESSMENT — PATIENT HEALTH QUESTIONNAIRE - PHQ9
SUM OF ALL RESPONSES TO PHQ9 QUESTIONS 1 AND 2: 0
1. LITTLE INTEREST OR PLEASURE IN DOING THINGS: NOT AT ALL
2. FEELING DOWN, DEPRESSED, IRRITABLE, OR HOPELESS: NOT AT ALL

## 2019-12-20 NOTE — OR SURGEON
Immediate Post OP Note    PreOp Diagnosis: Uterine pregnancy at 38 weeks and 4 days gestational age.  Breech presentation.  Premature rupture membranes    PostOp Diagnosis: Same    Procedure(s):   SECTION, PRIMARY - Wound Class: Clean Contaminated    Surgeon(s):  Cachorro Chowdary M.D.    Anesthesiologist/Type of Anesthesia:  Anesthesiologist: Jaylyn Martin M.D./Spinal    Surgical Staff:  Circulator: Cora Soares R.N.  Scrub Person: AVIVA Hernandez&NOHEMI Circulator Assistant: Maria C Hinojosa R.N.  L&NOHEMI Baby  Nurse: Shilo Jimenez R.N.    Specimens removed if any:  * No specimens in log *    Estimated Blood Loss: 750 mL    Findings: Male infant, Apgars of 8 and 9.  Weight 4090 g.  Normal placenta with three-vessel cord.  Bilateral normal tubes and ovaries.    Complications: None        2019 4:58 AM Cachorro Chowdary M.D.

## 2019-12-20 NOTE — ANESTHESIA PREPROCEDURE EVALUATION
24yoF  @ 38+4wks presenting for primary c/s for breech presentation    NKDA  NPO (last ate @ 1800)  Plt 164    Relevant Problems   No relevant active problems       Physical Exam    Airway   Mallampati: II       Cardiovascular - normal exam     Dental - normal exam         Pulmonary - normal exam     Abdominal - normal exam     Neurological - normal exam                 Anesthesia Plan    ASA 2       Plan - spinal   Neuraxial block will be primary anesthetic            Postoperative Plan: Postoperative administration of opioids is intended.    Pertinent diagnostic labs and testing reviewed    Informed Consent:    Anesthetic plan and risks discussed with patient.

## 2019-12-20 NOTE — ANESTHESIA PROCEDURE NOTES
Spinal Block  Date/Time: 12/20/2019 3:03 AM  Performed by: Jaylyn Martin M.D.  Authorized by: Jaylyn Martin M.D.     Patient Location:  OR  Start Time:  12/20/2019 3:03 AM  End Time:  12/20/2019 3:07 AM  Reason for Block: primary anesthetic    patient identified, IV checked, site marked, risks and benefits discussed, surgical consent, monitors and equipment checked, pre-op evaluation and timeout performed    Patient Position:  Sitting  Prep: ChloraPrep    Monitoring:  Continuous pulse oximetry, heart rate and blood pressure  Approach:  Midline  Location:  L4-5  Injection Technique:  Single-shot  Skin infiltration:  Lidocaine  Strength:  1%  Dose:  4ml  Needle Type:  Fariha  Needle Gauge:  25 G  CSF flowing pre/post injection:  Yes  Sensory Level:  T4

## 2019-12-20 NOTE — PROGRESS NOTES
0700Received bedside report from JEANNETTE Robins. Discussed plan of care. Patient will call for pain medication as needed. All needs met at this time.

## 2019-12-20 NOTE — PROGRESS NOTES
0230- Bedside report received from AMBER Mcclain RN. POC discussed, care assumed. Pt will have a primary c/s d/t breech fetal lie. Pt verbalizes understanding of POC. Pt now being prepped for surgery.     0300- Pt transferred to OR via wheelchair.    0330- Delivery of viable male infant. Infant to warmer. APGARS 8/9.    0400- Methlyne blue in sterile water 60 mL added to conte per MD orders for suspected bladder not intact. Methlyn blue accidentally injected in to conte bulb balloon by RN. Methlyne blue in sterile water inserted in to correct port, bladder remains intact.  Balloon popped. Conte removed, balloon portion still intact.     0432- Pt transferred to PACU via gurney w/ anesthesia. Report received from anesthesia. Pt stable.    0540- Pt remains stable. Pt transferred to PPU via gurney w/ infant in arms. Bedside report given to JEANNETTE FELIZ. Bands checked, were correct.

## 2019-12-20 NOTE — PROGRESS NOTES
1235 Dangled pt at bedside. Pt got to standing and took a few steps before getting dizzy and lightheaded. This RN and CNA eased pt down to floor. Emergency cord pulled. Extra RNs in room to assist with getting pt back in bed. Pt VSS. Pt now resting in bed comfortably. Pt denies any further dizziness at this time.

## 2019-12-20 NOTE — OP REPORT
DATE OF SERVICE: 2019     PREOPERATIVE DIAGNOSES:  1.  Intrauterine pregnancy at 38w4d  2.  Breech fetal presentation  3.  Premature rupture membranes     POSTOPERATIVE DIAGNOSES:  1.  Intrauterine pregnancy at 38w4d  2.  same    PROCEDURE PERFORMED: Primary low transverse  section.     SURGEON:  Cachorro Chowdary MD     ASSISTANT: Maggie Nguyen MD     ANESTHESIA:  Spinal.     ANESTHESIOLOGIST:  MD Veronica     SPECIMEN:  none     ESTIMATED BLOOD LOSS:  750 mL     FINDINGS:  A viable male infant, Apgars of 8 and 9 in complete breech   presentation with clear amniotic fluid.  There was a normal uterus,   tubes, and ovaries bilaterally.     COMPLICATIONS:  None.     PROCEDURE:  After appropriate consents were obtained, the patient was taken to the operating room where spinal  anesthesia was applied without complications.  The patient was then prepped and draped in the usual sterile manner.  Clamp test on the skin verified adequate anesthesia.  A Pfannenstiel incision was made with a scalpel 3cm superior to the pubic symphysis and extended down to the rectus fascia.  The rectus fascia was incised with the scalpel and tented up. The underlying rectus muscle was  from the fascia first inferiorly then superiorly using the barr scissors.  The rectus muscle was  bluntly in the midline.  The peritoneum was entered bluntly in the midline. The peritoneum incision was extended superiorly and inferiorly with the Metzenbaum scissors with great care to avoid injury to underlying bowel or bladder.  The vesicouterine peritoneum was tented up and entered with Metzenbaum scissors,    and a bladder flap was created.  Bladder blade was placed.  An incision was made into the lower uterine segment transversely and incision was extended bluntly.  Amniotomy was performed and there was noted to be clear amniotic fluid. The infant was in complete breech presentation.  The buttocks was delivered  followed by the trunk of the body.  Once the scapula was seen, the arms were swept forward into the hysterotomy incision and then the head was delivered without any complications.  The mouth and nares were suctioned. The cord was doubly clamped and cut and the infant was handed off to awaiting neonatology team.  The placenta was then allowed to spontaneously deliver. The uterus was exteriorized and cleared of clots and debris.  The hysterotomy incision was reapproximated with 1-0 Vicryl suture in a running locked fashion. A second layer of the same suture was placed to imbricate the incision creating a 2 layer closure.  Afterwards, numerous interrupted sutures of 0 chromic were needed to achieve excellent hemostasis.  As some of the sutures are in close proximity to the bladder, the bladder was then backfilled with saline solution mixed with methylene blue, no extravasation of the methylene blue solution was noted.  Hemostasis was noted.  The tubes and ovaries were examined and noted to be normal. The uterus was returned to the abdomen. The pericolic gutters were examined and any blood clots were removed.  The peritoneum was reapproximated with 3-0 Vicryl suture running.  The rectus muscles were examined and hemostatic.  The fascia was reapproximated with 0 Vicryl suture running.  The subcutaneous fat was irrigated and any small bleeders were bovied for hemostasis.  The subcutaneous fat was then reapproximated with 3-0 Vicryl suture running.  The skin was reapproximated with 3-0 Monocryl suture running. Steri strips and a dressing were placed.  Sponge, needle, instrument, and lap counts were correct x2.  Patient tolerated the procedure well and went to recovery room in stable condition.        ____________________________________  Cachorro Chowdary MD

## 2019-12-20 NOTE — H&P
History and Physical    Merari Mcghee is a 24 y.o. female  at 38w4d who presents for loss of fluid    Subjective:   24-year-old  3 para 2-0-0-2 at 30 weeks 4 days gestational age.  Patient complaining loss of fluid.  Grossly ruptured and examination.    Ultrasound shows breech presentation.    ROS: Pertinent positives documented in HPI and all other systems reviewed & are negative    OB History    Para Term  AB Living   3 2 2     2   SAB TAB Ectopic Molar Multiple Live Births             2      # Outcome Date GA Lbr Per/2nd Weight Sex Delivery Anes PTL Lv   3 Current            2 Term 17 39w3d  3.605 kg (7 lb 15.2 oz) F Vag-Spont   CHRISTIANO   1 Term 14 40w0d  3.487 kg (7 lb 11 oz) M Vag-Spont  N CHRISTIANO      Birth Comments: No complications       PGYNHx: None    History reviewed. No pertinent past medical history.    Past Surgical History:   Procedure Laterality Date   • INDRA BY LAPAROSCOPY N/A 2017    Procedure: INDRA BY LAPAROSCOPY;  Surgeon: Shelton Davis M.D.;  Location: SURGERY SAME DAY [facility];  Service:          Meds: Ferrous sulfate, prenatal vitamins    Allergies: Patient has no known allergies.    Social History     Socioeconomic History   • Marital status: Single     Spouse name: Not on file   • Number of children: Not on file   • Years of education: Not on file   • Highest education level: Not on file   Occupational History   • Not on file   Social Needs   • Financial resource strain: Not on file   • Food insecurity:     Worry: Never true     Inability: Never true   • Transportation needs:     Medical: No     Non-medical: No   Tobacco Use   • Smoking status: Never Smoker   • Smokeless tobacco: Never Used   Substance and Sexual Activity   • Alcohol use: No     Alcohol/week: 0.0 oz   • Drug use: No   • Sexual activity: Yes     Partners: Male     Comment: None    Lifestyle   • Physical activity:     Days per week: Not on file     Minutes per session: Not on  "file   • Stress: Not on file   Relationships   • Social connections:     Talks on phone: Not on file     Gets together: Not on file     Attends Sikhism service: Not on file     Active member of club or organization: Not on file     Attends meetings of clubs or organizations: Not on file     Relationship status: Not on file   • Intimate partner violence:     Fear of current or ex partner: Not on file     Emotionally abused: Not on file     Physically abused: Not on file     Forced sexual activity: Not on file   Other Topics Concern   • Not on file   Social History Narrative   • Not on file         FamHx: Noncontributory      Objective:      /89   Pulse 65   Temp 36.6 °C (97.8 °F) (Temporal)   Resp 20   Ht 1.676 m (5' 6\")   Wt 98 kg (216 lb)     General:   no acute distress, alert, cooperative   Skin:   normal   HEENT:  EOMI, Sclera clear, anicteric and Oropharynx clear, no lesions   Lungs:   CTA bilateral   Heart:   S1, S2 normal, no murmur, click, rub or gallop, regular rate and rhythm, chest is clear without rales or wheezing, no pedal edema, S1, S2 normal, no murmur, regular rate and rhythm   Abdomen:   soft, gravid, NT   EFW:  3500 g   Pelvis:  adequate with gynecoid pelvis   FHT:  130 BPM  Accels present  Decels absent  Variability moderate  Category 1   Uterine Size: S=D   Presentations: Breech   Cervix:     Dilation: 1cm    Effacement: 50%    Station:  -3    Consistency: Medium    Position: Middle     Lab Review        Assessment:     Breech presentation  Premature rupture membranes  Pregnancy at 30 weeks 4 days gestational age     Plan:     Admit to L&D  GBS negative  Fetal monitoring/toco  IV fluids      Discussed with the patient the risks of  delivery. The risks include pain, infection, bleeding, scarring, DVT/pulmonary embolism, pelvic pain, pelvic scarring, pain with intercourse, damage to other organs in the area of operation. Specifically organs that can be damaged are bowel, " bladder, ureters. I also discussed with the patient the risk of wound infection and wound breakdown. We discussed that these risks are greater in people that have diabetes or obesity. I also discussed the risk of emergency blood transfusion during procedure as well as emergency hysterectomy during procedure.    Patient had the opportunity to ask questions regarding procedures. All questions answered to the patient's satisfaction.

## 2019-12-21 PROCEDURE — 700111 HCHG RX REV CODE 636 W/ 250 OVERRIDE (IP): Performed by: ANESTHESIOLOGY

## 2019-12-21 PROCEDURE — A9270 NON-COVERED ITEM OR SERVICE: HCPCS | Performed by: ANESTHESIOLOGY

## 2019-12-21 PROCEDURE — 700112 HCHG RX REV CODE 229: Performed by: OBSTETRICS & GYNECOLOGY

## 2019-12-21 PROCEDURE — 770002 HCHG ROOM/CARE - OB PRIVATE (112)

## 2019-12-21 PROCEDURE — 700102 HCHG RX REV CODE 250 W/ 637 OVERRIDE(OP): Performed by: OBSTETRICS & GYNECOLOGY

## 2019-12-21 PROCEDURE — 700102 HCHG RX REV CODE 250 W/ 637 OVERRIDE(OP): Performed by: ANESTHESIOLOGY

## 2019-12-21 PROCEDURE — A9270 NON-COVERED ITEM OR SERVICE: HCPCS | Performed by: OBSTETRICS & GYNECOLOGY

## 2019-12-21 RX ORDER — ONDANSETRON 4 MG/1
4 TABLET, ORALLY DISINTEGRATING ORAL EVERY 6 HOURS PRN
Status: DISCONTINUED | OUTPATIENT
Start: 2019-12-21 | End: 2019-12-23 | Stop reason: HOSPADM

## 2019-12-21 RX ORDER — OXYCODONE AND ACETAMINOPHEN 10; 325 MG/1; MG/1
1 TABLET ORAL EVERY 4 HOURS PRN
Status: DISCONTINUED | OUTPATIENT
Start: 2019-12-21 | End: 2019-12-23 | Stop reason: HOSPADM

## 2019-12-21 RX ORDER — MORPHINE SULFATE 4 MG/ML
2 INJECTION, SOLUTION INTRAMUSCULAR; INTRAVENOUS
Status: DISCONTINUED | OUTPATIENT
Start: 2019-12-21 | End: 2019-12-23 | Stop reason: HOSPADM

## 2019-12-21 RX ORDER — ONDANSETRON 2 MG/ML
4 INJECTION INTRAMUSCULAR; INTRAVENOUS EVERY 6 HOURS PRN
Status: DISCONTINUED | OUTPATIENT
Start: 2019-12-21 | End: 2019-12-23 | Stop reason: HOSPADM

## 2019-12-21 RX ORDER — OXYCODONE HYDROCHLORIDE AND ACETAMINOPHEN 5; 325 MG/1; MG/1
1 TABLET ORAL EVERY 4 HOURS PRN
Status: DISCONTINUED | OUTPATIENT
Start: 2019-12-21 | End: 2019-12-23 | Stop reason: HOSPADM

## 2019-12-21 RX ORDER — IBUPROFEN 800 MG/1
800 TABLET ORAL EVERY 8 HOURS PRN
Status: DISCONTINUED | OUTPATIENT
Start: 2019-12-21 | End: 2019-12-23 | Stop reason: HOSPADM

## 2019-12-21 RX ORDER — DIPHENHYDRAMINE HCL 25 MG
25 TABLET ORAL EVERY 6 HOURS PRN
Status: DISCONTINUED | OUTPATIENT
Start: 2019-12-21 | End: 2019-12-23 | Stop reason: HOSPADM

## 2019-12-21 RX ORDER — DIPHENHYDRAMINE HYDROCHLORIDE 50 MG/ML
25 INJECTION INTRAMUSCULAR; INTRAVENOUS EVERY 6 HOURS PRN
Status: DISCONTINUED | OUTPATIENT
Start: 2019-12-21 | End: 2019-12-23 | Stop reason: HOSPADM

## 2019-12-21 RX ORDER — ACETAMINOPHEN 325 MG/1
325 TABLET ORAL EVERY 4 HOURS PRN
Status: DISCONTINUED | OUTPATIENT
Start: 2019-12-21 | End: 2019-12-23 | Stop reason: HOSPADM

## 2019-12-21 RX ADMIN — ACETAMINOPHEN 1000 MG: 500 TABLET ORAL at 03:13

## 2019-12-21 RX ADMIN — OXYCODONE HYDROCHLORIDE AND ACETAMINOPHEN 1 TABLET: 10; 325 TABLET ORAL at 20:25

## 2019-12-21 RX ADMIN — OXYCODONE HYDROCHLORIDE AND ACETAMINOPHEN 1 TABLET: 10; 325 TABLET ORAL at 12:21

## 2019-12-21 RX ADMIN — KETOROLAC TROMETHAMINE 30 MG: 30 INJECTION, SOLUTION INTRAMUSCULAR; INTRAVENOUS at 03:14

## 2019-12-21 RX ADMIN — DOCUSATE SODIUM 100 MG: 100 CAPSULE, LIQUID FILLED ORAL at 17:01

## 2019-12-21 RX ADMIN — VITAMIN A, VITAMIN C, VITAMIN D-3, VITAMIN E, VITAMIN B-1, VITAMIN B-2, NIACIN, VITAMIN B-6, CALCIUM, IRON, ZINC, COPPER 1 TABLET: 4000; 120; 400; 22; 1.84; 3; 20; 10; 1; 12; 200; 27; 25; 2 TABLET ORAL at 05:46

## 2019-12-21 RX ADMIN — OXYCODONE HYDROCHLORIDE 10 MG: 10 TABLET ORAL at 02:24

## 2019-12-21 RX ADMIN — OXYCODONE HYDROCHLORIDE AND ACETAMINOPHEN 1 TABLET: 5; 325 TABLET ORAL at 07:49

## 2019-12-21 RX ADMIN — SIMETHICONE CHEW TAB 80 MG 80 MG: 80 TABLET ORAL at 17:01

## 2019-12-21 RX ADMIN — IBUPROFEN 800 MG: 800 TABLET, FILM COATED ORAL at 17:01

## 2019-12-21 ASSESSMENT — EDINBURGH POSTNATAL DEPRESSION SCALE (EPDS)
I HAVE BLAMED MYSELF UNNECESSARILY WHEN THINGS WENT WRONG: NO, NEVER
I HAVE BEEN ANXIOUS OR WORRIED FOR NO GOOD REASON: NO, NOT AT ALL
I HAVE FELT SCARED OR PANICKY FOR NO GOOD REASON: NO, NOT AT ALL
I HAVE BEEN SO UNHAPPY THAT I HAVE HAD DIFFICULTY SLEEPING: NOT AT ALL
THE THOUGHT OF HARMING MYSELF HAS OCCURRED TO ME: NEVER
I HAVE FELT SAD OR MISERABLE: NO, NOT AT ALL
I HAVE BEEN SO UNHAPPY THAT I HAVE BEEN CRYING: NO, NEVER
THINGS HAVE BEEN GETTING ON TOP OF ME: NO, I HAVE BEEN COPING AS WELL AS EVER
I HAVE LOOKED FORWARD WITH ENJOYMENT TO THINGS: AS MUCH AS I EVER DID
I HAVE BEEN ABLE TO LAUGH AND SEE THE FUNNY SIDE OF THINGS: AS MUCH AS I ALWAYS COULD

## 2019-12-21 NOTE — PROGRESS NOTES
0745 Assumed care. Assessment completed, fundus firm, lochia light. ABD incision with dressing intact. POC reviewed, verbalized understanding.   1215 Patient indicated feeling lightheaded when up to the bathroom assisted by CNA. Vital signs; blood pressure 105/70, oxygen 96, pulse 104. Patient ambulated back to bed and indicated feeling better once in bed. Patient indicated voiding without difficulty. Educated patient to call for assistance when getting up.   1300 Patient sleeping, no signs of distress. Significant other at bedside assisting with infant.   1415 Patient resting in bed denies dizziness or discomfort. Denies pain at this time.   1530 Patient sleeping no signs of discomfort noted.  1700 Patient ambulated to restroom, tolerated well, voided without difficulty. Denies lightheadedness. Declines pain med intervention at this time.

## 2019-12-21 NOTE — PROGRESS NOTES
[facility] POSTPARTUM PROGRESS NOTE    PATIENT ID:  NAME:  Merari Mcghee  MRN:               [id number]  YOB: 1995     24 y.o. female G3 now P3 at 38w4d POD#1 s/p pC/S for breech presentation    Subjective: Doing well today. Denies chest pain, SOB, N/V. She has not yet ambulated and is complaining of some incisional pain. She has voided but has not yet had a BM or passed flatus. She has been tolerating a normal diet.     Objective:    Vitals:    12/20/19 1800 12/20/19 2200 12/21/19 0200 12/21/19 0600   BP: 101/65 110/63 115/72 109/72   Pulse: 72 82 85 86   Resp: 16 18 18 17   Temp: 36.6 °C (97.8 °F) 37.1 °C (98.7 °F) 36.3 °C (97.4 °F) 36.7 °C (98 °F)   TempSrc: Temporal Oral Oral Oral   SpO2: 96% 95% 96% 95%   Weight:       Height:         General: No acute distress, resting comfortably in bed.  HEENT: normocephalic, nontraumatic, PERRLA, EOMI  Cardiovascular: Heart RRR with no murmurs, rubs or gallops. Distal Pulses 2+  Respiratory: symmetric chest expansion, lungs CTA bilaterally with no wheezes rales or rhonci  Abdomen: soft, mildly tender around incision which is clean, dry and intact, fundus firm, +BS  Genitourinary: lochia light, denies excessive vaginal bleeding  Musculoskeletal: strength 5/5 in four extremities      Recent Labs     12/20/19  0220 12/20/19  1144   WBC 9.0 9.7   RBC 4.60 3.34*   HEMOGLOBIN 12.1 8.9*   HEMATOCRIT 37.2 27.7*   MCV 80.9* 79.6*   MCH 26.3* 26.6*   RDW 43.4 43.2   PLATELETCT 164 142*   MPV 12.4 11.5   NEUTSPOLYS 70.70  --    LYMPHOCYTES 20.50*  --    MONOCYTES 6.70  --    EOSINOPHILS 1.00  --    BASOPHILS 0.40  --      No results for input(s): SODIUM, POTASSIUM, CHLORIDE, CO2, GLUCOSE, BUN, CPKTOTAL in the last 72 hours.    Current Meds:      Assessment:  24 y.o. female G3 now P3 at 38w4d POD#1 s/p pC/S     Plan:   1. Cont to encourage ambulation  2. Tylenol/motrin and oxy PRN for pain  3. Would like to continue considering her options for contraception   4. Disposition:  Anticipate discharge 72 hours postop

## 2019-12-21 NOTE — CARE PLAN
Problem: Altered physiologic condition related to postoperative  delivery  Goal: Patient physiologically stable as evidenced by normal lochia, palpable uterine involution and vital signs within normal limits  Outcome: PROGRESSING AS EXPECTED  Note:   Fundus firm at U, lochia rubra minimal. Surgical dressing CDI. V/S WNL     Problem: Alteration in comfort related to surgical incision and/or after birth pains  Goal: Patient verbalizes acceptable pain level  Outcome: PROGRESSING AS EXPECTED  Note:   Patient will be medicated as scheduled. Pain controlled at this time.

## 2019-12-21 NOTE — CARE PLAN
Problem: Potential for postpartum infection related to surgical incision, compromised uterine condition, urinary tract or respiratory compromise  Goal: Patient will be afebrile and free from signs and symptoms of infection  Outcome: PROGRESSING AS EXPECTED  Note:   VSS. No signs or symptoms of infection noted.      Problem: Alteration in comfort related to surgical incision and/or after birth pains  Goal: Patient is able to ambulate, care for self and infant with acceptable pain level  Outcome: PROGRESSING AS EXPECTED  Note:   Patient ambulating. Caring for self and infant.

## 2019-12-21 NOTE — PROGRESS NOTES
Up to BR with assistance, ambulating well with steady gait. Perineal care rendered, voided without difficulty. Back to bed comfortably. No dizziness noted.

## 2019-12-21 NOTE — PROGRESS NOTES
Bedside report done, patient in bed socializing with visitors. Denies pain at this time, will continue to monitor.

## 2019-12-22 PROCEDURE — 700112 HCHG RX REV CODE 229: Performed by: OBSTETRICS & GYNECOLOGY

## 2019-12-22 PROCEDURE — 770002 HCHG ROOM/CARE - OB PRIVATE (112)

## 2019-12-22 PROCEDURE — 700102 HCHG RX REV CODE 250 W/ 637 OVERRIDE(OP): Performed by: OBSTETRICS & GYNECOLOGY

## 2019-12-22 PROCEDURE — A9270 NON-COVERED ITEM OR SERVICE: HCPCS | Performed by: OBSTETRICS & GYNECOLOGY

## 2019-12-22 PROCEDURE — 700111 HCHG RX REV CODE 636 W/ 250 OVERRIDE (IP): Performed by: OBSTETRICS & GYNECOLOGY

## 2019-12-22 RX ADMIN — OXYCODONE HYDROCHLORIDE AND ACETAMINOPHEN 1 TABLET: 10; 325 TABLET ORAL at 17:55

## 2019-12-22 RX ADMIN — SIMETHICONE CHEW TAB 80 MG 80 MG: 80 TABLET ORAL at 10:31

## 2019-12-22 RX ADMIN — OXYCODONE HYDROCHLORIDE AND ACETAMINOPHEN 1 TABLET: 10; 325 TABLET ORAL at 10:31

## 2019-12-22 RX ADMIN — IBUPROFEN 800 MG: 800 TABLET, FILM COATED ORAL at 02:13

## 2019-12-22 RX ADMIN — VITAMIN A, VITAMIN C, VITAMIN D-3, VITAMIN E, VITAMIN B-1, VITAMIN B-2, NIACIN, VITAMIN B-6, CALCIUM, IRON, ZINC, COPPER 1 TABLET: 4000; 120; 400; 22; 1.84; 3; 20; 10; 1; 12; 200; 27; 25; 2 TABLET ORAL at 06:31

## 2019-12-22 RX ADMIN — OXYCODONE HYDROCHLORIDE AND ACETAMINOPHEN 1 TABLET: 10; 325 TABLET ORAL at 06:31

## 2019-12-22 RX ADMIN — OXYCODONE HYDROCHLORIDE AND ACETAMINOPHEN 1 TABLET: 10; 325 TABLET ORAL at 02:13

## 2019-12-22 RX ADMIN — ONDANSETRON 4 MG: 4 TABLET, ORALLY DISINTEGRATING ORAL at 22:59

## 2019-12-22 RX ADMIN — DOCUSATE SODIUM 100 MG: 100 CAPSULE, LIQUID FILLED ORAL at 10:31

## 2019-12-22 RX ADMIN — IBUPROFEN 800 MG: 800 TABLET, FILM COATED ORAL at 10:31

## 2019-12-22 NOTE — PROGRESS NOTES
0830 Assessment completed, fundus firm, lochia light. ABD incision with dressing intact. In [place]ian, POC reviewed, verbalized understanding. Denies pain at this time, will call if pain med intervention needed. Denies dizziness.   1700 Discharge instructions handout reviewed with patient in [place]ian, verbalized understanding. INJOY dirk information provided, declined assistance to active at this time.

## 2019-12-22 NOTE — CARE PLAN
Problem: Alteration in comfort related to surgical incision and/or after birth pains  Goal: Patient is able to ambulate, care for self and infant with acceptable pain level  Outcome: PROGRESSING AS EXPECTED  Note:   Patient ambulating, caring for self and infant      Problem: Potential knowledge deficit related to lack of understanding of self and  care  Goal: Patient will verbalize understanding of self and infant care  Outcome: PROGRESSING AS EXPECTED  Note:   Patient verbalizes and understands self and infant care.

## 2019-12-22 NOTE — PROGRESS NOTES
[facility] POSTPARTUM PROGRESS NOTE    Name:   Merari Mcghee   Date/Time:  12/22/2019 6:58 AM  Gestational Age:  38w4d  Admit Date:   12/20/2019  Admitting Dx:   Pregnancy  Indication for care in labor or delivery    POD# 2 S/P primary C/S breech presentation    S:  Abdominal pain no   Ambulating   yes  Tolerating PO  yes  Flatus    yes  Bleeding   no  Voiding   yes   Dizziness   no  Breast feeding  no  Breast tenderness  yes    O:  Vitals:    12/21/19 0600 12/21/19 1400 12/21/19 1800 12/22/19 0600   BP: 109/72 110/73 113/73 117/77   Pulse: 86 96 72 94   Resp: 17 18 18 17   Temp: 36.7 °C (98 °F) 36.6 °C (97.8 °F) 36.6 °C (97.8 °F) 36.4 °C (97.6 °F)   TempSrc: Oral Temporal Temporal Temporal   SpO2: 95% 96% 95% 99%   Weight:       Height:         No intake or output data in the 24 hours ending 12/22/19 0658  General: No acute distress, resting comfortably in bed.  HEENT: normocephalic, nontraumatic, EOMI  Cardiovascular: Heart RRR without murmur, no rubs or gallops. Distal Pulses 2+  Respiratory: symmetric chest expansion, lungs CTA bilaterally with no wheezes rales or rhonci  Abdomen: soft, mildly tender around incision which is clean, dry and intact, fundus firm, +BS  Genitourinary: lochia light, denies excessive vaginal bleeding  Musculoskeletal: strength 5/5 in four extremities, no calf tenderness  Neuro: no focal deficits noted    Recent Labs     12/20/19  0220 12/20/19  1144   WBC 9.0 9.7   RBC 4.60 3.34*   HEMOGLOBIN 12.1 8.9*   HEMATOCRIT 37.2 27.7*   MCV 80.9* 79.6*   MCH 26.3* 26.6*   RDW 43.4 43.2   PLATELETCT 164 142*   MPV 12.4 11.5   NEUTSPOLYS 70.70  --    LYMPHOCYTES 20.50*  --    MONOCYTES 6.70  --    EOSINOPHILS 1.00  --    BASOPHILS 0.40  --      No results for input(s): SODIUM, POTASSIUM, CHLORIDE, CO2, GLUCOSE, BUN, CPKTOTAL in the last 72 hours.    Current Meds:   Current Facility-Administered Medications   Medication Dose Frequency Provider Last Rate Last Dose   • ibuprofen (MOTRIN) tablet 800  mg  800 mg Q8HRS PRN Cachorro Chowdary M.D.   800 mg at 12/22/19 0213   • acetaminophen (TYLENOL) tablet 325 mg  325 mg Q4HRS PRN Cachorro Chowdary M.D.       • oxyCODONE-acetaminophen (PERCOCET) 5-325 MG per tablet 1 Tab  1 Tab Q4HRS PRN Cachorro Chowdary M.D.   1 Tab at 12/21/19 0749   • oxyCODONE-acetaminophen (PERCOCET-10)  MG per tablet 1 Tab  1 Tab Q4HRS PRN Cachorro Chowdary M.D.   1 Tab at 12/22/19 0631   • morphine (pf) 4 MG/ML injection 2 mg  2 mg Q3HRS PRN Cachorro Chowdary M.D.       • ondansetron (ZOFRAN) syringe/vial injection 4 mg  4 mg Q6HRS PRN Cachorro Chowdary M.D.        Or   • ondansetron (ZOFRAN ODT) dispertab 4 mg  4 mg Q6HRS PRN Cachorro Chowdary M.D.       • diphenhydrAMINE (BENADRYL) tablet/capsule 25 mg  25 mg Q6HRS PRN Cachorro Chowdary M.D.        Or   • diphenhydrAMINE (BENADRYL) injection 25 mg  25 mg Q6HRS PRN Cachorro Chowdary M.D.       • LR infusion   PRN Cachorro Chowdary M.D. 125 mL/hr at 12/20/19 1921 1,000 mL at 12/20/19 1921   • PRN oxytocin (PITOCIN) (20 Units/1000 mL) PRN for excessive uterine bleeding - See Admin Instr  125-999 mL/hr Once PRN Cachorro Chowdary M.D.       • miSOPROStol (CYTOTEC) tablet 800 mcg  800 mcg Once PRN Cachorro Chowdary M.D.       • docusate sodium (COLACE) capsule 100 mg  100 mg BID PRN Cachorro Chowdary M.D.   100 mg at 12/21/19 1701   • simethicone (MYLICON) chewable tab 80 mg  80 mg 4X/DAY PRN Cachorro Chowdary M.D.   80 mg at 12/21/19 1701   • prenatal plus vitamin (STUARTNATAL 1+1) 27-1 MG tablet 1 Tab  1 Tab QAM Cachorro Chowdary M.D.   1 Tab at 12/22/19 0631   • oxytocin (PITOCIN) 20 UNITS/1000ML LR (postpartum)   mL/hr Continuous Cachorro Chowdary M.D. 125 mL/hr at 12/20/19 0430 125 mL/hr at 12/20/19 0430     Facility-Administered Medications Ordered in Other Encounters   Medication Dose Frequency Provider Last Rate Last Dose   • electrolyte-A (PLASMALYTE-A) infusion   Intra-Op Continuous Jaylyn  DAMON Martin       • ceFAZolin (ANCEF) injection   PRJIM Martin M.D.   2 g at 19 0308   • phenylephrine (KOURTNEY-SYNEPHRINE) 100 mcg/mL inj (IV Push Syringe)   MARILYN Martin M.D.   100 mcg at 19 0315   • oxytocin (PITOCIN) injection   PRJIM Martin M.D.   30 Units at 19 0331   • ketorolac (TORADOL) injection   PRJIM Martin M.D.   30 mg at 19 0409   Last reviewed on 2019  4:55 AM by Cora Soares R.N.        A:   24 y.o. female  at 38w4d POD#2 s/p pC/S for breech presentation. Patient only with minimal ambulation and no BM to date.       P:  Routine C/S Postpartum care, continue pain management, encourage ambulation, anticipate DC POD#3     Maggie Nguyen M.D.

## 2019-12-22 NOTE — CARE PLAN
Problem: Altered physiologic condition related to postoperative  delivery  Goal: Patient physiologically stable as evidenced by normal lochia, palpable uterine involution and vital signs within normal limits  Outcome: PROGRESSING AS EXPECTED  Note:   Fundus firm at U, lochia rubra minimal. Surgical incision with tegaderm and dry gauze CDI. V/S WNL     Problem: Potential for postpartum infection related to surgical incision, compromised uterine condition, urinary tract or respiratory compromise  Goal: Patient will be afebrile and free from signs and symptoms of infection  Outcome: PROGRESSING AS EXPECTED  Note:   Patient has no signs of infection noted at this time.

## 2019-12-23 VITALS
BODY MASS INDEX: 34.72 KG/M2 | DIASTOLIC BLOOD PRESSURE: 71 MMHG | RESPIRATION RATE: 17 BRPM | WEIGHT: 216 LBS | HEART RATE: 82 BPM | HEIGHT: 66 IN | SYSTOLIC BLOOD PRESSURE: 112 MMHG | TEMPERATURE: 97.8 F | OXYGEN SATURATION: 98 %

## 2019-12-23 PROCEDURE — 700102 HCHG RX REV CODE 250 W/ 637 OVERRIDE(OP): Performed by: OBSTETRICS & GYNECOLOGY

## 2019-12-23 PROCEDURE — A9270 NON-COVERED ITEM OR SERVICE: HCPCS | Performed by: OBSTETRICS & GYNECOLOGY

## 2019-12-23 RX ORDER — PSEUDOEPHEDRINE HCL 30 MG
100 TABLET ORAL 2 TIMES DAILY PRN
Qty: 60 CAP | Refills: 3 | Status: SHIPPED | OUTPATIENT
Start: 2019-12-23 | End: 2023-04-26

## 2019-12-23 RX ORDER — IBUPROFEN 800 MG/1
800 TABLET ORAL EVERY 8 HOURS PRN
Qty: 30 TAB | Refills: 3 | Status: SHIPPED | OUTPATIENT
Start: 2019-12-23 | End: 2022-05-31

## 2019-12-23 RX ORDER — FERROUS SULFATE 325(65) MG
325 TABLET ORAL 3 TIMES DAILY
Qty: 90 TAB | Refills: 2 | Status: SHIPPED | OUTPATIENT
Start: 2019-12-23 | End: 2023-04-26

## 2019-12-23 RX ORDER — OXYCODONE HYDROCHLORIDE AND ACETAMINOPHEN 5; 325 MG/1; MG/1
1 TABLET ORAL EVERY 4 HOURS PRN
Qty: 20 TAB | Refills: 0 | Status: SHIPPED | OUTPATIENT
Start: 2019-12-23 | End: 2019-12-28

## 2019-12-23 RX ADMIN — IBUPROFEN 800 MG: 800 TABLET, FILM COATED ORAL at 12:30

## 2019-12-23 RX ADMIN — VITAMIN A, VITAMIN C, VITAMIN D-3, VITAMIN E, VITAMIN B-1, VITAMIN B-2, NIACIN, VITAMIN B-6, CALCIUM, IRON, ZINC, COPPER 1 TABLET: 4000; 120; 400; 22; 1.84; 3; 20; 10; 1; 12; 200; 27; 25; 2 TABLET ORAL at 06:01

## 2019-12-23 RX ADMIN — OXYCODONE HYDROCHLORIDE AND ACETAMINOPHEN 1 TABLET: 10; 325 TABLET ORAL at 06:02

## 2019-12-23 RX ADMIN — OXYCODONE HYDROCHLORIDE AND ACETAMINOPHEN 1 TABLET: 10; 325 TABLET ORAL at 12:30

## 2019-12-23 RX ADMIN — OXYCODONE HYDROCHLORIDE AND ACETAMINOPHEN 1 TABLET: 10; 325 TABLET ORAL at 01:36

## 2019-12-23 RX ADMIN — IBUPROFEN 800 MG: 800 TABLET, FILM COATED ORAL at 01:36

## 2019-12-23 NOTE — DISCHARGE SUMMARY
Discharge Summary:      Merari Mcghee      Admit Date:   2019  Discharge Date:  2019     Admitting diagnosis:  Pregnancy  Indication for care in labor or delivery  Discharge Diagnosis: Status post primary C/S for breech  Pregnancy Complications: none  Tubal Ligation:  no        History:  History reviewed. No pertinent past medical history.  OB History    Para Term  AB Living   3 3 3     3   SAB TAB Ectopic Molar Multiple Live Births           0 3      # Outcome Date GA Lbr Per/2nd Weight Sex Delivery Anes PTL Lv   3 Term 19 38w4d  4.09 kg (9 lb 0.3 oz) M CS-LTranv Spinal N CHRISTIANO   2 Term 17 39w3d  3.605 kg (7 lb 15.2 oz) F Vag-Spont   CHRISTIANO   1 Term 14 40w0d  3.487 kg (7 lb 11 oz) M Vag-Spont  N CHRISTIANO      Birth Comments: No complications        Patient has no known allergies.  Patient Active Problem List    Diagnosis Date Noted   • Supervision of other normal pregnancy 07/15/2019        Hospital Course:   24 y.o. now , was admitted with the above mentioned diagnosis, underwent primary C/S for breech presentation. Patient postpartum course was unremarkable, with progressive advancement in diet, ambulation and toleration of oral analgesia. Patient has passed bowel movement. Patient without complaints today and desires discharge.      Abdominal pain: well controlled  Ambulating: yes  tolerating liquids: yes  tolerating regular diet: yes  Flatus: yes  BM: yes  Bleeding: very light  Voiding: yes  Dizziness: no  Breast feeding: no (bottle)  Breast tenderness: no    Vitals:    19 1400 19 1800 19 0600 19 1800   BP: 110/73 113/73 117/77 104/69   Pulse: 96 72 94 95   Resp: 18 18 17 17   Temp: 36.6 °C (97.8 °F) 36.6 °C (97.8 °F) 36.4 °C (97.6 °F) 36.5 °C (97.7 °F)   TempSrc: Temporal Temporal Temporal Temporal   SpO2: 96% 95% 99% 100%   Weight:       Height:           Current Facility-Administered Medications   Medication Dose   • ibuprofen (MOTRIN)  tablet 800 mg  800 mg   • acetaminophen (TYLENOL) tablet 325 mg  325 mg   • oxyCODONE-acetaminophen (PERCOCET) 5-325 MG per tablet 1 Tab  1 Tab   • oxyCODONE-acetaminophen (PERCOCET-10)  MG per tablet 1 Tab  1 Tab   • morphine (pf) 4 MG/ML injection 2 mg  2 mg   • ondansetron (ZOFRAN) syringe/vial injection 4 mg  4 mg    Or   • ondansetron (ZOFRAN ODT) dispertab 4 mg  4 mg   • diphenhydrAMINE (BENADRYL) tablet/capsule 25 mg  25 mg    Or   • diphenhydrAMINE (BENADRYL) injection 25 mg  25 mg   • LR infusion     • PRN oxytocin (PITOCIN) (20 Units/1000 mL) PRN for excessive uterine bleeding - See Admin Instr  125-999 mL/hr   • miSOPROStol (CYTOTEC) tablet 800 mcg  800 mcg   • docusate sodium (COLACE) capsule 100 mg  100 mg   • simethicone (MYLICON) chewable tab 80 mg  80 mg   • prenatal plus vitamin (STUARTNATAL 1+1) 27-1 MG tablet 1 Tab  1 Tab   • oxytocin (PITOCIN) 20 UNITS/1000ML LR (postpartum)   mL/hr     Facility-Administered Medications Ordered in Other Encounters   Medication Dose   • electrolyte-A (PLASMALYTE-A) infusion     • ceFAZolin (ANCEF) injection     • phenylephrine (KOURTNEY-SYNEPHRINE) 100 mcg/mL inj (IV Push Syringe)     • oxytocin (PITOCIN) injection     • ketorolac (TORADOL) injection         Exam:  Vitals:    12/21/19 1400 12/21/19 1800 12/22/19 0600 12/22/19 1800   BP: 110/73 113/73 117/77 104/69   Pulse: 96 72 94 95   Resp: 18 18 17 17   Temp: 36.6 °C (97.8 °F) 36.6 °C (97.8 °F) 36.4 °C (97.6 °F) 36.5 °C (97.7 °F)   TempSrc: Temporal Temporal Temporal Temporal   SpO2: 96% 95% 99% 100%   Weight:       Height:         General: No acute distress, resting comfortably in bed.  HEENT: normocephalic, nontraumatic, EOMI  Cardiovascular: Heart RRR with no murmurs, rubs or gallops. Distal Pulses 2+  Respiratory: symmetric chest expansion, lungs CTA bilaterally with no wheezes rales or rhonci  Abdomen: soft, mildly tender around incision which is clean, dry and intact, fundus firm,  +BS  Genitourinary: lochia light  Musculoskeletal: strength 5/5 in four extremities, no calf tenderness  Neuro: no focal deficits noted       Labs:  Recent Labs     12/20/19  1144   WBC 9.7   RBC 3.34*   HEMOGLOBIN 8.9*   HEMATOCRIT 27.7*   MCV 79.6*   MCH 26.6*   MCHC 33.5*   RDW 43.2   PLATELETCT 142*   MPV 11.5        Activity:   Discharge to home  Pelvic Rest x 6 weeks       Follow up: .  [facility] in 5 weeks for post partum follow up; 1 week for incision check.   To resume daily PNV and iron supplement if needed with hydration.     Patient to return to [facility] or ER if any of the following occur:   Fever over 100.5   Severe abdominal pain   Red streaks or painful masses in the breasts   Foul smelling discharge or lochia   Heavy vaginal bleeding saturating a pad per hour   S/s of PP depression     Discharge Meds:      Medication List      ASK your doctor about these medications      Instructions   PRENATAL VITAMINS PO   Take 1 Tab by mouth every day.  Dose:  1 Tab            Maggie Nguyen M.D.

## 2019-12-23 NOTE — PROGRESS NOTES
0830 Assessment completed, fundus firm, lochia scant. ABD incision with dressing intact. In Kyrgyz, POC reviewed, verbalized understanding. Denies pain at this time, will call if pain med intervention needed.   1240 In Kyrgyz, discharge instructions reviewed, verbalized understanding, papers signed. Prescribed med scripts given, reviewed, verbalized understanding.   1400 Patient left facility on wheelchair escorted by staff.

## 2019-12-23 NOTE — CARE PLAN
Problem: Altered physiologic condition related to postoperative  delivery  Goal: Patient physiologically stable as evidenced by normal lochia, palpable uterine involution and vital signs within normal limits  Outcome: PROGRESSING AS EXPECTED  Note:   Fundus firm, lochia scant     Problem: Alteration in comfort related to surgical incision and/or after birth pains  Goal: Patient is able to ambulate, care for self and infant with acceptable pain level  Outcome: PROGRESSING AS EXPECTED  Note:   Patient ambulating, caring for self and infant.      Problem: Potential knowledge deficit related to lack of understanding of self and  care  Goal: Patient will demonstrate ability to care for self and infant  Outcome: PROGRESSING AS EXPECTED  Note:   Patient demonstrates proper       Problem: Potential knowledge deficit related to lack of understanding of self and  care  Goal: Patient will demonstrate ability to care for self and infant  Outcome: PROGRESSING AS EXPECTED  Note:   Patient demonstrates proper care for self and infant.

## 2019-12-23 NOTE — DISCHARGE INSTRUCTIONS
POSTPARTUM DISCHARGE INSTRUCTIONS FOR MOM    YOB: 1995   Age: 24 y.o.               Admit Date: 2019     Discharge Date: 2019  Attending Doctor:  Cachorro Chowdary M.D.                  Allergies:  Patient has no known allergies.    Discharged to home by car. Discharged via wheelchair, hospital escort: Yes.  Special equipment needed: Not Applicable  Belongings with: Personal  Be sure to schedule a follow-up appointment with your primary care doctor or any specialists as instructed.     Discharge Plan:   Diet Plan: Discussed  Activity Level: Discussed  Confirmed Follow up Appointment: Patient to Call and Schedule Appointment  Confirmed Symptoms Management: Discussed  Medication Reconciliation Updated: Yes  Influenza Vaccine Indication: Not indicated: Previously immunized this influenza season and > 8 years of age    REASONS TO CALL YOUR OBSTETRICIAN:  1.   Persistent fever or shaking chills (Temperature higher than 100.4)  2.   Heavy bleeding (soaking more than 1 pad per hour); Passing clots  3.   Foul odor from vagina  4.   Mastitis (Breast infection; breast pain, chills, fever, redness)  5.   Urinary pain, burning or frequency  6.   Episiotomy infection  7.   Abdominal incision infection  8.   Severe depression longer than 24 hours    HAND WASHING  · Prior to handling the baby.  · Before breastfeeding or bottle feeding baby.  · After using the bathroom or changing the baby's diaper.    WOUND CARE  Ask your physician for additional care instructions.  In general:    ·  Incision:      · Keep clean and dry.    · Do NOT lift anything heavier than your baby for up to 6 weeks.    · There should not be any opening or pus.      VAGINAL CARE  · Nothing inside vagina for 6 weeks: no sexual intercourse, tampons or douching.  · Bleeding may continue for 2-4 weeks.  Amount may vary.    · Call your physician for heavy bleeding which means soaking more than 1 pad per hour    BIRTH CONTROL  · It  "is possible to become pregnant at any time after delivery and while breastfeeding.  · Plan to discuss a method of birth control with your physician at your follow up visit. visit.    DIET AND ELIMINATION  · Eating more fiber (bran cereal, fruits, and vegetables) and drinking plenty of fluids will help to avoid constipation.  · Urinary frequency after childbirth is normal.    POSTPARTUM BLUES  During the first few days after birth, you may experience a sense of the \"blues\" which may include impatience, irritability or even crying.  These feeling come and go quickly.  However, as many as 1 in 10 women experience emotional symptoms known as postpartum depression.    Postpartum depression:  May start as early as the second or third day after delivery or take several weeks or months to develop.  Symptoms of \"blues\" are present, but are more intense:  Crying spells; loss of appetite; feelings of hopelessness or loss of control; fear of touching the baby; over concern or no concern at all about the baby; little or no concern about your own appearance/caring for yourself; and/or inability to sleep or excessive sleeping.  Contact your physician if you are experiencing any of these symptoms.    Crisis Hotline:  · [place] Crisis Hotline:  8-312-POJRIVE  Or 1-389.916.7888  · [place] Crisis Hotline:  1-671.112.3907  Or 901-145-5101    PREVENTING SHAKEN BABY:  If you are angry or stressed, PUT THE BABY IN THE CRIB, step away, take some deep breaths, and wait until you are calm to care for the baby.  DO NOT SHAKE THE BABY.  You are not alone, call a supporter for help.    · [facility] Center 24/7 crisis line 866-703-7007 or 1-120.134.4621  · You can also text them, text \"ANSWER\" to 664743    QUIT SMOKING/TOBACCO USE:  I understand the use of any tobacco products increases my chance of suffering from future heart disease and could cause other illnesses which may shorten my life. Quitting the use of tobacco products is the single " most important thing I can do to improve my health. For further information on smoking / tobacco cessation call a Toll Free Quit Line at 1-494.885.3264 (*[facility]) or 1-629.425.1528 (American Lung Association) or you can access the web based program at www.lungusa.org.    · [place] Tobacco Users Help Line:  (622) 564-5579       Toll Free: 1-429.600.8354  · Quit Tobacco Program [facility] (940)695-0198    DEPRESSION / SUICIDE RISK:  As you are discharged from this [facility], it is important to learn how to keep safe from harming yourself.    Recognize the warning signs:  · Abrupt changes in personality, positive or negative- including increase in energy   · Giving away possessions  · Change in eating patterns- significant weight changes-  positive or negative  · Change in sleeping patterns- unable to sleep or sleeping all the time   · Unwillingness or inability to communicate  · Depression  · Unusual sadness, discouragement and loneliness  · Talk of wanting to die  · Neglect of personal appearance   · Rebelliousness- reckless behavior  · Withdrawal from people/activities they love  · Confusion- inability to concentrate     If you or a loved one observes any of these behaviors or has concerns about self-harm, here's what you can do:  · Talk about it- your feelings and reasons for harming yourself  · Remove any means that you might use to hurt yourself (examples: pills, rope, extension cords, firearm)  · Get professional help from the community (Mental Health, Substance Abuse, psychological counseling)  · Do not be alone:Call your Safe Contact- someone whom you trust who will be there for you.  · Call your local CRISIS HOTLINE 359-4340 or 865-709-7049  · Call your local Children's Mobile Crisis Response Team [place] (277) 143-2483 or www.[employer]  · Call the toll free National Suicide Prevention Hotlines   · National Suicide Prevention Lifeline  560-040-UYKV (3158)  · [facility] 800-SUICIDE (201-6888)    DISCHARGE SURVEY:  Thank you for choosing [facility].  We hope we provided you with very good care.  You may be receiving a survey in the mail.  Please fill it out.  Your opinion is valuable to us.    ADDITIONAL EDUCATIONAL MATERIALS GIVEN TO PATIENT: [facility] Discharge Instructions Handout         My signature on this form indicates that:  1.  I have reviewed and understand the above information  2.  My questions regarding this information have been answered to my satisfaction.  3.  I have formulated a plan with my discharge nurse to obtain my prescribed medication for home.

## 2019-12-24 ASSESSMENT — PAIN SCALES - GENERAL: PAIN_LEVEL: 0

## 2019-12-24 NOTE — ANESTHESIA QCDR
2019 [facility] Clinical Data Registry (for Quality Improvement)     Postoperative nausea/vomiting risk protocol (Adult = 18 yrs and Pediatric 3-17 yrs)- (430 and 463)  General inhalation anesthetic (NOT TIVA) with PONV risk factors: No  Provision of anti-emetic therapy with at least 2 different classes of agents: N/A  Patient DID NOT receive anti-emetic therapy and reason is documented in Medical Record: N/A    Multimodal Pain Management- (AQI59)  Patient undergoing Elective Surgery (i.e. Outpatient, or ASC, or Prescheduled Surgery prior to Hospital Admission): No  Use of Multimodal Pain Management, two or more drugs and/or interventions, NOT including systemic opioids: N/A  Exception: Documented allergy to multiple classes of analgesics: N/A    PACU assessment of acute postoperative pain prior to Anesthesia Care End- Applies to Patients Age = 18- (ABG7)  Initial PACU pain score is which of the following: < 7/10  Patient unable to report pain score: N/A    Post-anesthetic transfer of care checklist/protocol to PACU/ICU- (426 and 427)  Upon conclusion of case, patient transferred to which of the following locations: PACU/Non-ICU  Use of transfer checklist/protocol: Yes  Exclusion: Service Performed in Patient Hospital Room (and thus did not require transfer): N/A    PACU Reintubation- (AQI31)  General anesthesia requiring endotracheal intubation (ETT) along with subsequent extubation in OR or PACU: No  Required reintubation in the PACU: N/A  Extubation was a planned trial documented in the medical record prior to removal of the original airway device: N/A    Unplanned admission to ICU related to anesthesia service up through end of PACU care- (MD51)  Unplanned admission to ICU (not initially anticipated at anesthesia start time): No

## 2019-12-24 NOTE — ANESTHESIA POSTPROCEDURE EVALUATION
Patient: Merari Mcghee    Procedure Summary     Date:  19 Room / Location:  [facility] OR 01 / LABOR AND DELIVERY    Anesthesia Start:  030 Anesthesia Stop:      Procedure:   SECTION, PRIMARY (N/A Abdomen) Diagnosis:  (same, ginette)    Surgeon:  Cachorro Chowdary M.D. Responsible Provider:  Jaylyn Martin M.D.    Anesthesia Type:  spinal ASA Status:  2          Final Anesthesia Type: spinal  Last vitals  BP   Blood Pressure: 112/71    Temp   36.6 °C (97.8 °F)    Pulse   Pulse: 82   Resp   17    SpO2   98 %      Anesthesia Post Evaluation    Patient location during evaluation: PACU  Patient participation: complete - patient participated  Level of consciousness: awake and alert  Pain score: 0    Airway patency: patent  Anesthetic complications: no  Cardiovascular status: adequate  Respiratory status: acceptable  Hydration status: acceptable    PONV: none           Nurse Pain Score: 2 (NPRS)

## 2019-12-24 NOTE — ANESTHESIA TIME REPORT
Anesthesia Start and Stop Event Times     Date Time Event    12/20/2019 0235 Ready for Procedure     0300 Anesthesia Start     0437 Anesthesia Stop        Responsible Staff  12/20/19    Name Role Begin End    Jaylyn Martin M.D. Anesth 0300 0437        Preop Diagnosis (Free Text):  Pre-op Diagnosis     breech        Preop Diagnosis (Codes):    Post op Diagnosis  Breech presentation      Premium Reason  A. 3PM - 7AM    Comments:

## 2019-12-30 ENCOUNTER — POST PARTUM (OUTPATIENT)
Dept: OBGYN | Facility: CLINIC | Age: 24
End: 2019-12-30

## 2019-12-30 VITALS — WEIGHT: 203 LBS | DIASTOLIC BLOOD PRESSURE: 70 MMHG | SYSTOLIC BLOOD PRESSURE: 120 MMHG | BODY MASS INDEX: 32.77 KG/M2

## 2019-12-30 DIAGNOSIS — Z09 POSTOP CHECK: ICD-10-CM

## 2019-12-30 DIAGNOSIS — Z98.891 S/P CESAREAN SECTION: ICD-10-CM

## 2019-12-30 DIAGNOSIS — Z30.014 ENCOUNTER FOR INITIAL PRESCRIPTION OF INTRAUTERINE CONTRACEPTIVE DEVICE (IUD): ICD-10-CM

## 2019-12-30 PROCEDURE — 99024 POSTOP FOLLOW-UP VISIT: CPT | Performed by: OBSTETRICS & GYNECOLOGY

## 2019-12-30 NOTE — PROGRESS NOTES
Subjective:      Merari Mcghee is a 24 y.o. female who presents for Other (C/Section check)            HPI patient is postoperative day #10 status post repeat  who presents today for postop check.  She is doing well without any complaints or concerns.  Pain is adequately controlled.  Bleeding is scant.  She reports normal bowel and bladder functions.  Denies any nausea vomiting or dysuria.  Denies any depression symptoms.    ROS all organ systems were reviewed and were negative       Objective:     /70   Wt 92.1 kg (203 lb)   LMP 2019 (Exact Date)   BMI 32.77 kg/m²      Physical Exam  Vitals signs and nursing note reviewed. Exam conducted with a chaperone present.   Constitutional:       General: She is not in acute distress.     Appearance: Normal appearance. She is obese. She is not toxic-appearing.   Neck:      Musculoskeletal: Normal range of motion and neck supple.   Cardiovascular:      Rate and Rhythm: Normal rate and regular rhythm.      Pulses: Normal pulses.      Heart sounds: Normal heart sounds.   Pulmonary:      Effort: Pulmonary effort is normal. No respiratory distress.      Breath sounds: Normal breath sounds. No wheezing.   Abdominal:      General: Abdomen is flat. Bowel sounds are normal. There is no distension.      Palpations: Abdomen is soft.      Comments: Abdomen is appropriately tender to palpation.  Silver dressing was removed and incision is noted to be intact and healing well without sign of infection   Musculoskeletal:         General: No swelling or tenderness.      Right lower leg: No edema.      Left lower leg: No edema.   Skin:     General: Skin is warm and dry.      Coloration: Skin is not jaundiced.      Findings: No bruising.   Neurological:      General: No focal deficit present.      Mental Status: She is alert and oriented to person, place, and time.      Motor: No weakness.      Gait: Gait normal.   Psychiatric:         Mood and Affect: Mood  normal.         Behavior: Behavior normal.         Thought Content: Thought content normal.         Judgment: Judgment normal.          Discussion:  Patient has questions regarding contraception options and we discussed contraception options available including pills patches vaginal ring implant and IUDs.  I discussed the pros and cons risk and benefits failure rates and complication with all of these options.  After discussion patient states she would like to have the copper IUD and was counseled on this device.  Insurance authorization requested       Assessment/Plan:       1. Postop check  Patient presents today for postop check status post repeat  postoperative day #10.  Incision is intact and healing well  Patient will continue postoperative restrictions    2. S/P  section    3.  Patient counseled on contraception options.  She desires copper IUD.  Patient was counseled on this IUD.  Insurance authorization requested    Precautions and plan of care were reviewed

## 2019-12-30 NOTE — PROGRESS NOTES
Pt here today for C/Section check  Operation date: 12/20/2019  WT: 203 lb  BP: 120/70  Pt states incision pain has decreased. States no concerns today.   Good # [phone]

## 2020-01-23 ENCOUNTER — POST PARTUM (OUTPATIENT)
Dept: OBGYN | Facility: CLINIC | Age: 25
End: 2020-01-23
Payer: MEDICAID

## 2020-01-23 ENCOUNTER — HOSPITAL ENCOUNTER (OUTPATIENT)
Facility: MEDICAL CENTER | Age: 25
End: 2020-01-23
Attending: PHYSICIAN ASSISTANT
Payer: MEDICAID

## 2020-01-23 VITALS — WEIGHT: 190 LBS | SYSTOLIC BLOOD PRESSURE: 110 MMHG | BODY MASS INDEX: 30.67 KG/M2 | DIASTOLIC BLOOD PRESSURE: 72 MMHG

## 2020-01-23 PROBLEM — Z98.891 S/P CESAREAN SECTION: Status: RESOLVED | Noted: 2019-12-30 | Resolved: 2020-01-23

## 2020-01-23 PROBLEM — Z34.80 SUPERVISION OF OTHER NORMAL PREGNANCY, ANTEPARTUM: Status: RESOLVED | Noted: 2019-07-15 | Resolved: 2020-01-23

## 2020-01-23 LAB — CYTOLOGY REG CYTOL: NORMAL

## 2020-01-23 PROCEDURE — 0503F POSTPARTUM CARE VISIT: CPT | Performed by: PHYSICIAN ASSISTANT

## 2020-01-23 PROCEDURE — 88175 CYTOPATH C/V AUTO FLUID REDO: CPT

## 2020-01-23 ASSESSMENT — EDINBURGH POSTNATAL DEPRESSION SCALE (EPDS)
I HAVE BEEN ANXIOUS OR WORRIED FOR NO GOOD REASON: NO, NOT AT ALL
I HAVE BLAMED MYSELF UNNECESSARILY WHEN THINGS WENT WRONG: NO, NEVER
I HAVE LOOKED FORWARD WITH ENJOYMENT TO THINGS: AS MUCH AS I EVER DID
TOTAL SCORE: 0
I HAVE BEEN ABLE TO LAUGH AND SEE THE FUNNY SIDE OF THINGS: AS MUCH AS I ALWAYS COULD
THINGS HAVE BEEN GETTING ON TOP OF ME: NO, I HAVE BEEN COPING AS WELL AS EVER
I HAVE BEEN SO UNHAPPY THAT I HAVE BEEN CRYING: NO, NEVER
THE THOUGHT OF HARMING MYSELF HAS OCCURRED TO ME: NEVER
I HAVE FELT SAD OR MISERABLE: NO, NOT AT ALL
I HAVE BEEN SO UNHAPPY THAT I HAVE HAD DIFFICULTY SLEEPING: NOT AT ALL
I HAVE FELT SCARED OR PANICKY FOR NO GOOD REASON: NO, NOT AT ALL

## 2020-01-23 ASSESSMENT — ENCOUNTER SYMPTOMS
GASTROINTESTINAL NEGATIVE: 1
RESPIRATORY NEGATIVE: 1
MUSCULOSKELETAL NEGATIVE: 1
PSYCHIATRIC NEGATIVE: 1
CONSTITUTIONAL NEGATIVE: 1
CARDIOVASCULAR NEGATIVE: 1
EYES NEGATIVE: 1
NEUROLOGICAL NEGATIVE: 1

## 2020-01-23 NOTE — PROGRESS NOTES
Pt here today for postpartum exam.  Delivery type: C Section 12/20/2019  Currently : bottle feeding  Desired BCM: Paragard. She is scheduled for 1/31/2020  LMP: n/a  Last pap:1/2017. Pap today  Phone # [phone]

## 2020-01-23 NOTE — PROGRESS NOTES
Subjective:      Merari Mcghee is a 24 y.o. female who presents with Postpartum visit today. 4wk s/p primary C/S at 38w4d for breech presentation, no complications. Pt has no complaints- denies heavy vaginal bleeding, depression, intercourse, pain or BF. PAP wnl 1/17 - repeat PAP done today. BCM desired is Paragard IUD, as appt in 1 wk for placement. Pt adamant she doesn't want any more children, so very happy with this plan. Work release note given for pt to return 2/14/20.            HPI    Review of Systems   Constitutional: Negative.    HENT: Negative.    Eyes: Negative.    Respiratory: Negative.    Cardiovascular: Negative.    Gastrointestinal: Negative.    Genitourinary: Negative.    Musculoskeletal: Negative.    Skin: Negative.    Neurological: Negative.    Endo/Heme/Allergies: Negative.    Psychiatric/Behavioral: Negative.    All other systems reviewed and are negative.         Objective:     /72   Wt 86.2 kg (190 lb)   LMP 03/25/2019 (Exact Date)   BMI 30.67 kg/m²      Physical Exam  Vitals signs reviewed.   Constitutional:       Appearance: She is well-developed.   HENT:      Head: Normocephalic and atraumatic.   Eyes:      Pupils: Pupils are equal, round, and reactive to light.   Neck:      Musculoskeletal: Normal range of motion and neck supple.      Thyroid: No thyromegaly.   Cardiovascular:      Rate and Rhythm: Normal rate and regular rhythm.      Heart sounds: Normal heart sounds.   Pulmonary:      Effort: Pulmonary effort is normal. No respiratory distress.      Breath sounds: Normal breath sounds.   Abdominal:      General: Bowel sounds are normal. There is no distension.      Palpations: Abdomen is soft.      Tenderness: There is no tenderness.      Comments: C/S incision: C/D/I without erythema or induration   Genitourinary:     Exam position: Supine.      Labia:         Right: No rash or tenderness.         Left: No rash or tenderness.       Vagina: Normal. No signs of injury and  foreign body. No vaginal discharge or erythema.      Cervix: No cervical motion tenderness.      Uterus: Not deviated, not enlarged and not tender.       Adnexa:         Right: No mass or tenderness.          Left: No mass or tenderness.     Skin:     General: Skin is warm and dry.      Findings: No erythema.   Neurological:      Mental Status: She is alert.      Deep Tendon Reflexes: Reflexes are normal and symmetric.   Psychiatric:         Behavior: Behavior normal.         Thought Content: Thought content normal.                 Assessment/Plan:       1. Postpartum care following  delivery  - F/u 1 wk for Paragard IUD placment  - Work release note given for   - THINPREP PAP,REFLEX HPV ON ASC-US ONLY; Future

## 2020-01-31 ENCOUNTER — HOSPITAL ENCOUNTER (OUTPATIENT)
Dept: LAB | Facility: MEDICAL CENTER | Age: 25
End: 2020-01-31
Attending: OBSTETRICS & GYNECOLOGY
Payer: MEDICAID

## 2020-01-31 ENCOUNTER — GYNECOLOGY VISIT (OUTPATIENT)
Dept: OBGYN | Facility: CLINIC | Age: 25
End: 2020-01-31
Payer: MEDICAID

## 2020-01-31 VITALS — DIASTOLIC BLOOD PRESSURE: 62 MMHG | SYSTOLIC BLOOD PRESSURE: 106 MMHG | WEIGHT: 193 LBS | BODY MASS INDEX: 31.15 KG/M2

## 2020-01-31 DIAGNOSIS — Z30.430 ENCOUNTER FOR INSERTION OF PARAGARD IUD: ICD-10-CM

## 2020-01-31 LAB — B-HCG SERPL-ACNC: <0.6 MIU/ML (ref 0–5)

## 2020-01-31 PROCEDURE — 84702 CHORIONIC GONADOTROPIN TEST: CPT

## 2020-01-31 PROCEDURE — 36415 COLL VENOUS BLD VENIPUNCTURE: CPT

## 2020-01-31 PROCEDURE — 58300 INSERT INTRAUTERINE DEVICE: CPT | Performed by: OBSTETRICS & GYNECOLOGY

## 2020-01-31 RX ORDER — COPPER 313.4 MG/1
1 INTRAUTERINE DEVICE INTRAUTERINE ONCE
Status: COMPLETED | OUTPATIENT
Start: 2020-01-31 | End: 2020-01-31

## 2020-01-31 RX ADMIN — COPPER 1 EACH: 313.4 INTRAUTERINE DEVICE INTRAUTERINE at 15:19

## 2020-01-31 NOTE — PROCEDURES
IUD Insertion  Date/Time: 1/31/2020 3:16 PM  Performed by: Gerardo Ortiz D.O.  Authorized by: Gerardo Ortiz D.O.     Consent:     Consent obtained:  Verbal and written    Procedure risks and benefits discussed: yes      Patient questions answered: yes      Patient agrees, verbalizes understanding, and wants to proceed: yes    Pre-procedure details:     Negative GC/chlamydia test: yes      Negative serum pregnancy test: yes    Procedure:     Pelvic exam performed: yes      Sterile speculum placed in vagina: yes      Cervix visualized: yes      Cervix cleaned and prepped in sterile fashion: yes      Tenaculum applied to cervix: yes      Dilation needed: no      Uterus sounded: yes      Uterus sound depth (cm):  7    IUD type:  ParaGard    Strings trimmed: yes    Post-procedure:     Patient tolerated procedure well: yes      Patient will follow up after next period: yes    Comments:       no [id number]

## 2020-03-20 ENCOUNTER — GYNECOLOGY VISIT (OUTPATIENT)
Dept: OBGYN | Facility: CLINIC | Age: 25
End: 2020-03-20
Payer: MEDICAID

## 2020-03-20 DIAGNOSIS — Z30.431 IUD CHECK UP: ICD-10-CM

## 2020-03-20 PROCEDURE — 99212 OFFICE O/P EST SF 10 MIN: CPT | Performed by: OBSTETRICS & GYNECOLOGY

## 2020-03-20 NOTE — NON-PROVIDER
Pt is here for a paragard check.   Inserted on 1/31/2020 by Dr. Otriz   Only on period, it is very heavy bleeding  Pharmacy Verified:   Phone number: [phone]   [id number]

## 2020-03-20 NOTE — PROGRESS NOTES
S: This is a 24 y.o. year old  who is s/p Paragard placement. She has had some spotting and cramping since the insertion, but currently has no complaints. Denies pelvic pain, heavy vaginal bleeding, or abnormal vaginal discharge.     O: There were no vitals taken for this visit.    GENERAL: Alert, in no apparent distress  PSYCHIATRIC: Appropriate affect, intact insight and judgement.  ABDOMEN: Soft, nontender, nondistended.  No palpable masses.  No rebound or guarding.  No inguinal lymphadenopathy.  No hepatosplenomegaly.  No hernias.    GENITOURINARY:  Normal external genitalia, no lesions.  Normal urethral meatus, no masses or tenderness.  Normal bladder without fullness or masses.  Vagina well estrogenized, no vaginal discharge or lesions.  Cervix without lesions or discharge, nontender. IUD string present at cervical os.   Uterus normal size, shape, and contour, nontender.  Adnexa nontender, no masses.  Normal anus and perineum.    Rectal Exam - not indicated.    ASSESSMENT: s/p Paragard  IUD Placement, in proper position.  No side effects.      PLAN: F/U PRN

## 2020-12-12 NOTE — TELEPHONE ENCOUNTER
Pt walk in requesting a note to be off work. Was informed that she doesn't have a medical reason to be off work but we can write a letter to start maternity leave.  Agreed w/letter.     Cough

## 2021-09-21 ENCOUNTER — HOSPITAL ENCOUNTER (EMERGENCY)
Facility: MEDICAL CENTER | Age: 26
End: 2021-09-21
Attending: STUDENT IN AN ORGANIZED HEALTH CARE EDUCATION/TRAINING PROGRAM
Payer: MEDICAID

## 2021-09-21 VITALS
HEART RATE: 61 BPM | RESPIRATION RATE: 18 BRPM | WEIGHT: 166.89 LBS | BODY MASS INDEX: 26.82 KG/M2 | DIASTOLIC BLOOD PRESSURE: 70 MMHG | TEMPERATURE: 98.1 F | HEIGHT: 66 IN | SYSTOLIC BLOOD PRESSURE: 110 MMHG | OXYGEN SATURATION: 98 %

## 2021-09-21 DIAGNOSIS — R10.12 LEFT UPPER QUADRANT ABDOMINAL PAIN: ICD-10-CM

## 2021-09-21 LAB
ALBUMIN SERPL BCP-MCNC: 4.2 G/DL (ref 3.2–4.9)
ALBUMIN/GLOB SERPL: 1.4 G/DL
ALP SERPL-CCNC: 70 U/L (ref 30–99)
ALT SERPL-CCNC: 12 U/L (ref 2–50)
ANION GAP SERPL CALC-SCNC: 11 MMOL/L (ref 7–16)
APPEARANCE UR: CLEAR
AST SERPL-CCNC: 16 U/L (ref 12–45)
BASOPHILS # BLD AUTO: 0.6 % (ref 0–1.8)
BASOPHILS # BLD: 0.05 K/UL (ref 0–0.12)
BILIRUB SERPL-MCNC: 0.8 MG/DL (ref 0.1–1.5)
BILIRUB UR QL STRIP.AUTO: NEGATIVE
BUN SERPL-MCNC: 14 MG/DL (ref 8–22)
CALCIUM SERPL-MCNC: 8.9 MG/DL (ref 8.5–10.5)
CHLORIDE SERPL-SCNC: 104 MMOL/L (ref 96–112)
CO2 SERPL-SCNC: 23 MMOL/L (ref 20–33)
COLOR UR: YELLOW
CREAT SERPL-MCNC: 0.54 MG/DL (ref 0.5–1.4)
EOSINOPHIL # BLD AUTO: 0.55 K/UL (ref 0–0.51)
EOSINOPHIL NFR BLD: 6.4 % (ref 0–6.9)
ERYTHROCYTE [DISTWIDTH] IN BLOOD BY AUTOMATED COUNT: 47.8 FL (ref 35.9–50)
GLOBULIN SER CALC-MCNC: 3 G/DL (ref 1.9–3.5)
GLUCOSE SERPL-MCNC: 105 MG/DL (ref 65–99)
GLUCOSE UR STRIP.AUTO-MCNC: NEGATIVE MG/DL
HCG SERPL QL: NEGATIVE
HCT VFR BLD AUTO: 40.7 % (ref 37–47)
HGB BLD-MCNC: 12.5 G/DL (ref 12–16)
IMM GRANULOCYTES # BLD AUTO: 0.04 K/UL (ref 0–0.11)
IMM GRANULOCYTES NFR BLD AUTO: 0.5 % (ref 0–0.9)
KETONES UR STRIP.AUTO-MCNC: NEGATIVE MG/DL
LEUKOCYTE ESTERASE UR QL STRIP.AUTO: NEGATIVE
LIPASE SERPL-CCNC: 28 U/L (ref 11–82)
LYMPHOCYTES # BLD AUTO: 1.9 K/UL (ref 1–4.8)
LYMPHOCYTES NFR BLD: 21.9 % (ref 22–41)
MCH RBC QN AUTO: 23.1 PG (ref 27–33)
MCHC RBC AUTO-ENTMCNC: 30.7 G/DL (ref 33.6–35)
MCV RBC AUTO: 75.4 FL (ref 81.4–97.8)
MICRO URNS: NORMAL
MONOCYTES # BLD AUTO: 0.46 K/UL (ref 0–0.85)
MONOCYTES NFR BLD AUTO: 5.3 % (ref 0–13.4)
NEUTROPHILS # BLD AUTO: 5.66 K/UL (ref 2–7.15)
NEUTROPHILS NFR BLD: 65.3 % (ref 44–72)
NITRITE UR QL STRIP.AUTO: NEGATIVE
NRBC # BLD AUTO: 0 K/UL
NRBC BLD-RTO: 0 /100 WBC
PH UR STRIP.AUTO: 6 [PH] (ref 5–8)
PLATELET # BLD AUTO: 303 K/UL (ref 164–446)
PMV BLD AUTO: 10.8 FL (ref 9–12.9)
POTASSIUM SERPL-SCNC: 3.9 MMOL/L (ref 3.6–5.5)
PROT SERPL-MCNC: 7.2 G/DL (ref 6–8.2)
PROT UR QL STRIP: NEGATIVE MG/DL
RBC # BLD AUTO: 5.4 M/UL (ref 4.2–5.4)
RBC UR QL AUTO: NEGATIVE
SODIUM SERPL-SCNC: 138 MMOL/L (ref 135–145)
SP GR UR STRIP.AUTO: 1.01
UROBILINOGEN UR STRIP.AUTO-MCNC: 0.2 MG/DL
WBC # BLD AUTO: 8.7 K/UL (ref 4.8–10.8)

## 2021-09-21 PROCEDURE — 700102 HCHG RX REV CODE 250 W/ 637 OVERRIDE(OP): Performed by: STUDENT IN AN ORGANIZED HEALTH CARE EDUCATION/TRAINING PROGRAM

## 2021-09-21 PROCEDURE — 83690 ASSAY OF LIPASE: CPT

## 2021-09-21 PROCEDURE — 99284 EMERGENCY DEPT VISIT MOD MDM: CPT

## 2021-09-21 PROCEDURE — 85025 COMPLETE CBC W/AUTO DIFF WBC: CPT

## 2021-09-21 PROCEDURE — A9270 NON-COVERED ITEM OR SERVICE: HCPCS | Performed by: STUDENT IN AN ORGANIZED HEALTH CARE EDUCATION/TRAINING PROGRAM

## 2021-09-21 PROCEDURE — 80053 COMPREHEN METABOLIC PANEL: CPT

## 2021-09-21 PROCEDURE — 81003 URINALYSIS AUTO W/O SCOPE: CPT

## 2021-09-21 PROCEDURE — 84703 CHORIONIC GONADOTROPIN ASSAY: CPT

## 2021-09-21 RX ORDER — OMEPRAZOLE 20 MG/1
20 CAPSULE, DELAYED RELEASE ORAL DAILY
Qty: 30 CAPSULE | Refills: 0 | Status: SHIPPED | OUTPATIENT
Start: 2021-09-21 | End: 2022-05-31

## 2021-09-21 RX ORDER — OMEPRAZOLE 20 MG/1
20 CAPSULE, DELAYED RELEASE ORAL ONCE
Status: COMPLETED | OUTPATIENT
Start: 2021-09-21 | End: 2021-09-21

## 2021-09-21 RX ADMIN — OMEPRAZOLE 20 MG: 20 CAPSULE, DELAYED RELEASE ORAL at 22:03

## 2021-09-21 ASSESSMENT — LIFESTYLE VARIABLES
EVER HAD A DRINK FIRST THING IN THE MORNING TO STEADY YOUR NERVES TO GET RID OF A HANGOVER: NO
DO YOU DRINK ALCOHOL: NO
AVERAGE NUMBER OF DAYS PER WEEK YOU HAVE A DRINK CONTAINING ALCOHOL: 0
CONSUMPTION TOTAL: NEGATIVE
HOW MANY TIMES IN THE PAST YEAR HAVE YOU HAD 5 OR MORE DRINKS IN A DAY: 0
TOTAL SCORE: 0
HAVE YOU EVER FELT YOU SHOULD CUT DOWN ON YOUR DRINKING: NO
HAVE PEOPLE ANNOYED YOU BY CRITICIZING YOUR DRINKING: NO
ON A TYPICAL DAY WHEN YOU DRINK ALCOHOL HOW MANY DRINKS DO YOU HAVE: 0
TOTAL SCORE: 0
EVER FELT BAD OR GUILTY ABOUT YOUR DRINKING: NO
TOTAL SCORE: 0

## 2021-09-21 ASSESSMENT — PAIN DESCRIPTION - PAIN TYPE: TYPE: ACUTE PAIN

## 2021-09-22 NOTE — ED TRIAGE NOTES
Merari Mcghee  26 y.o. female  Chief Complaint   Patient presents with   • Abdominal Pain     Patient refused to use [place] language line . Family member translated chief compliant.     Patient reports middle abdominal pain since last Thursday described as tight associated with vomiting and diarrhea.     Vitals:    09/21/21 1940   BP: 102/72   Pulse: 64   Resp: 18   Temp: 36.6 °C (97.8 °F)   SpO2: 97%

## 2021-09-22 NOTE — ED PROVIDER NOTES
"ED Provider Note    CHIEF COMPLAINT  Chief Complaint   Patient presents with   • Abdominal Pain       HPI  Merari Mcghee is a 26 y.o. female who presents left upper quadrant abdominal pain since Thursday.  Patient states pain is worse a little bit of time after she eats.  Pain is described as burning.  Denies fever, nausea, vomiting, diarrhea.  States she stools every day and this is normal for her.  Has a history of gallstones and has had her gallbladder removed.  Has been using ibuprofen for pain.  Denies heavy alcohol or caffeine use.  No known history of ulcers in the past.  Denies dysuria, abnormal vaginal bleeding or discharge.      REVIEW OF SYSTEMS  See HPI for further details. All other systems are negative.     PAST MEDICAL HISTORY   Status post cholecystectomy, no chronic medical problems    SOCIAL HISTORY  Social History     Tobacco Use   • Smoking status: Never Smoker   • Smokeless tobacco: Never Used   Vaping Use   • Vaping Use: Never used   Substance and Sexual Activity   • Alcohol use: No     Alcohol/week: 0.0 oz   • Drug use: No   • Sexual activity: Yes     Partners: Male     Comment: None        SURGICAL HISTORY   has a past surgical history that includes beatriz by laparoscopy (N/A, 2017); other;  delivery only (N/A, 2019); and primary c section (2019).    CURRENT MEDICATIONS  Home Medications    **Home medications have not yet been reviewed for this encounter**         ALLERGIES  No Known Allergies    PHYSICAL EXAM  VITAL SIGNS: /70   Pulse 61   Temp 36.7 °C (98.1 °F) (Temporal)   Resp 18   Ht 1.676 m (5' 6\")   Wt 75.7 kg (166 lb 14.2 oz)   LMP 2021   SpO2 98%   BMI 26.94 kg/m²     Pulse ox interpretation: I interpret this pulse ox as normal.  Constitutional: Alert in no apparent distress.  HENT: No signs of trauma, Bilateral external ears normal, Nose normal.   Eyes: Pupils are equal and reactive, Conjunctiva normal, Non-icteric.   Neck: Normal " range of motion, No tenderness, Supple, No stridor.   Cardiovascular: Regular rate and rhythm, no murmurs.   Thorax & Lungs: Normal breath sounds, No respiratory distress, No wheezing, No chest tenderness.   Abdomen: Soft, mild left upper quadrant tenderness, No masses, No pulsatile masses. No peritoneal signs.  Skin: Warm, Dry, No erythema, No rash.   Extremities: Intact distal pulses, No edema, No tenderness, No cyanosis.  Musculoskeletal: Good range of motion in all major joints. No major deformities noted.   Neurologic: Alert , Normal motor function, Normal sensory function, No focal deficits noted.   Psychiatric: Affect normal, Judgment normal, Mood normal.       DIAGNOSTIC STUDIES / PROCEDURES      LABS  Results for orders placed or performed during the hospital encounter of 09/21/21   CBC WITH DIFFERENTIAL   Result Value Ref Range    WBC 8.7 4.8 - 10.8 K/uL    RBC 5.40 4.20 - 5.40 M/uL    Hemoglobin 12.5 12.0 - 16.0 g/dL    Hematocrit 40.7 37.0 - 47.0 %    MCV 75.4 (L) 81.4 - 97.8 fL    MCH 23.1 (L) 27.0 - 33.0 pg    MCHC 30.7 (L) 33.6 - 35.0 g/dL    RDW 47.8 35.9 - 50.0 fL    Platelet Count 303 164 - 446 K/uL    MPV 10.8 9.0 - 12.9 fL    Neutrophils-Polys 65.30 44.00 - 72.00 %    Lymphocytes 21.90 (L) 22.00 - 41.00 %    Monocytes 5.30 0.00 - 13.40 %    Eosinophils 6.40 0.00 - 6.90 %    Basophils 0.60 0.00 - 1.80 %    Immature Granulocytes 0.50 0.00 - 0.90 %    Nucleated RBC 0.00 /100 WBC    Neutrophils (Absolute) 5.66 2.00 - 7.15 K/uL    Lymphs (Absolute) 1.90 1.00 - 4.80 K/uL    Monos (Absolute) 0.46 0.00 - 0.85 K/uL    Eos (Absolute) 0.55 (H) 0.00 - 0.51 K/uL    Baso (Absolute) 0.05 0.00 - 0.12 K/uL    Immature Granulocytes (abs) 0.04 0.00 - 0.11 K/uL    NRBC (Absolute) 0.00 K/uL   COMP METABOLIC PANEL   Result Value Ref Range    Sodium 138 135 - 145 mmol/L    Potassium 3.9 3.6 - 5.5 mmol/L    Chloride 104 96 - 112 mmol/L    Co2 23 20 - 33 mmol/L    Anion Gap 11.0 7.0 - 16.0    Glucose 105 (H) 65 - 99  mg/dL    Bun 14 8 - 22 mg/dL    Creatinine 0.54 0.50 - 1.40 mg/dL    Calcium 8.9 8.5 - 10.5 mg/dL    AST(SGOT) 16 12 - 45 U/L    ALT(SGPT) 12 2 - 50 U/L    Alkaline Phosphatase 70 30 - 99 U/L    Total Bilirubin 0.8 0.1 - 1.5 mg/dL    Albumin 4.2 3.2 - 4.9 g/dL    Total Protein 7.2 6.0 - 8.2 g/dL    Globulin 3.0 1.9 - 3.5 g/dL    A-G Ratio 1.4 g/dL   LIPASE   Result Value Ref Range    Lipase 28 11 - 82 U/L   HCG QUAL SERUM   Result Value Ref Range    Beta-Hcg Qualitative Serum Negative Negative   URINALYSIS,CULTURE IF INDICATED    Specimen: Urine   Result Value Ref Range    Color Yellow     Character Clear     Specific Gravity 1.012 <1.035    Ph 6.0 5.0 - 8.0    Glucose Negative Negative mg/dL    Ketones Negative Negative mg/dL    Protein Negative Negative mg/dL    Bilirubin Negative Negative    Urobilinogen, Urine 0.2 Negative    Nitrite Negative Negative    Leukocyte Esterase Negative Negative    Occult Blood Negative Negative    Micro Urine Req see below    ESTIMATED GFR   Result Value Ref Range    GFR If African American >60 >60 mL/min/1.73 m 2    GFR If Non African American >60 >60 mL/min/1.73 m 2         RADIOLOGY  No orders to display           COURSE & MEDICAL DECISION MAKING  Pertinent Labs & Imaging studies reviewed. (See chart for details)    26-year-old female presenting with multiple days of left upper quadrant abdominal pain worse with eating.  Normal vital signs in ED.  Mild left upper quadrant tenderness with no peritoneal signs.  History is consistent with gastritis versus developing ulcer.  Her labs show no leukocytosis or left shift making infectious etiology less likely.  Lipase is normal unlikely pancreatitis and LFTs are normal do not suspect choledocholithiasis with biliary obstruction.  No tenderness in the right lower quadrant suspect appendicitis.  Reportedly stooling normally less likely constipation.  Starting on proton pump inhibitor and discharged with close return precautions and  advised to follow-up closely with her PCP.      The patient will not drink alcohol nor drive with prescribed medications. The patient will return for worsening symptoms and is stable at the time of discharge. The patient verbalizes understanding and will comply.    FINAL IMPRESSION  1. Left upper quadrant abdominal pain           Electronically signed by: Ariella Chavez M.D., 9/21/2021 9:45 PM

## 2021-09-22 NOTE — DISCHARGE INSTRUCTIONS
Start taking the medication we have prescribed once daily for acid.  Follow-up with your doctor in the next 1 to 2 days for recheck.  If the pain is worsening or you develop fevers, return to the emergency department regular reevaluated in 12 hours.

## 2022-02-23 ENCOUNTER — OFFICE VISIT (OUTPATIENT)
Dept: MEDICAL GROUP | Facility: MEDICAL CENTER | Age: 27
End: 2022-02-23
Attending: NURSE PRACTITIONER
Payer: COMMERCIAL

## 2022-02-23 VITALS
WEIGHT: 166.8 LBS | HEIGHT: 66 IN | OXYGEN SATURATION: 98 % | DIASTOLIC BLOOD PRESSURE: 74 MMHG | HEART RATE: 79 BPM | SYSTOLIC BLOOD PRESSURE: 102 MMHG | RESPIRATION RATE: 16 BRPM | TEMPERATURE: 96.9 F | BODY MASS INDEX: 26.81 KG/M2

## 2022-02-23 DIAGNOSIS — Z76.89 ENCOUNTER TO ESTABLISH CARE: ICD-10-CM

## 2022-02-23 DIAGNOSIS — B35.1 ONYCHOMYCOSIS: ICD-10-CM

## 2022-02-23 DIAGNOSIS — Z13.21 SCREENING FOR ENDOCRINE, NUTRITIONAL, METABOLIC AND IMMUNITY DISORDER: ICD-10-CM

## 2022-02-23 DIAGNOSIS — Z13.29 SCREENING FOR ENDOCRINE, NUTRITIONAL, METABOLIC AND IMMUNITY DISORDER: ICD-10-CM

## 2022-02-23 DIAGNOSIS — Z13.0 SCREENING FOR ENDOCRINE, NUTRITIONAL, METABOLIC AND IMMUNITY DISORDER: ICD-10-CM

## 2022-02-23 DIAGNOSIS — Z13.228 SCREENING FOR ENDOCRINE, NUTRITIONAL, METABOLIC AND IMMUNITY DISORDER: ICD-10-CM

## 2022-02-23 DIAGNOSIS — Z23 NEED FOR VACCINATION: ICD-10-CM

## 2022-02-23 PROCEDURE — 99204 OFFICE O/P NEW MOD 45 MIN: CPT | Performed by: NURSE PRACTITIONER

## 2022-02-23 PROCEDURE — 99212 OFFICE O/P EST SF 10 MIN: CPT | Performed by: NURSE PRACTITIONER

## 2022-02-23 PROCEDURE — 90471 IMMUNIZATION ADMIN: CPT

## 2022-02-23 ASSESSMENT — PATIENT HEALTH QUESTIONNAIRE - PHQ9: CLINICAL INTERPRETATION OF PHQ2 SCORE: 0

## 2022-02-23 ASSESSMENT — FIBROSIS 4 INDEX: FIB4 SCORE: 0.41

## 2022-02-23 NOTE — ASSESSMENT & PLAN NOTE
Discussed health history and maintenance   Flu vaccine - Provided  Colon Ca screening - Not applicable   Mammogram- Not Applicable   Pap smear - Done within last 3 years   STD Screening- Declined   Preventative screening labs ordered   Will only follow up if there are abnormal lab values - otherwise follow up in 6 months

## 2022-02-23 NOTE — ASSESSMENT & PLAN NOTE
Acute-  Discussed treatment - Will obtain labs including liver enzymes prior to treatment.   Anticipate terbinafine 250mg po daily for 12 weeks once CMP completed and normal

## 2022-02-24 ENCOUNTER — HOSPITAL ENCOUNTER (OUTPATIENT)
Dept: LAB | Facility: MEDICAL CENTER | Age: 27
End: 2022-02-24
Attending: NURSE PRACTITIONER
Payer: COMMERCIAL

## 2022-02-24 DIAGNOSIS — Z13.21 SCREENING FOR ENDOCRINE, NUTRITIONAL, METABOLIC AND IMMUNITY DISORDER: ICD-10-CM

## 2022-02-24 DIAGNOSIS — Z13.228 SCREENING FOR ENDOCRINE, NUTRITIONAL, METABOLIC AND IMMUNITY DISORDER: ICD-10-CM

## 2022-02-24 DIAGNOSIS — Z13.29 SCREENING FOR ENDOCRINE, NUTRITIONAL, METABOLIC AND IMMUNITY DISORDER: ICD-10-CM

## 2022-02-24 DIAGNOSIS — Z13.0 SCREENING FOR ENDOCRINE, NUTRITIONAL, METABOLIC AND IMMUNITY DISORDER: ICD-10-CM

## 2022-02-24 LAB
25(OH)D3 SERPL-MCNC: 9 NG/ML (ref 30–100)
ALBUMIN SERPL BCP-MCNC: 4.4 G/DL (ref 3.2–4.9)
ALBUMIN/GLOB SERPL: 1.5 G/DL
ALP SERPL-CCNC: 76 U/L (ref 30–99)
ALT SERPL-CCNC: 16 U/L (ref 2–50)
ANION GAP SERPL CALC-SCNC: 9 MMOL/L (ref 7–16)
AST SERPL-CCNC: 16 U/L (ref 12–45)
BASOPHILS # BLD AUTO: 1 % (ref 0–1.8)
BASOPHILS # BLD: 0.05 K/UL (ref 0–0.12)
BILIRUB SERPL-MCNC: 1.3 MG/DL (ref 0.1–1.5)
BUN SERPL-MCNC: 11 MG/DL (ref 8–22)
CALCIUM SERPL-MCNC: 8.9 MG/DL (ref 8.5–10.5)
CHLORIDE SERPL-SCNC: 107 MMOL/L (ref 96–112)
CHOLEST SERPL-MCNC: 137 MG/DL (ref 100–199)
CO2 SERPL-SCNC: 23 MMOL/L (ref 20–33)
CREAT SERPL-MCNC: 0.64 MG/DL (ref 0.5–1.4)
EOSINOPHIL # BLD AUTO: 0.12 K/UL (ref 0–0.51)
EOSINOPHIL NFR BLD: 2.5 % (ref 0–6.9)
ERYTHROCYTE [DISTWIDTH] IN BLOOD BY AUTOMATED COUNT: 46.3 FL (ref 35.9–50)
EST. AVERAGE GLUCOSE BLD GHB EST-MCNC: 108 MG/DL
FASTING STATUS PATIENT QL REPORTED: NORMAL
GLOBULIN SER CALC-MCNC: 3 G/DL (ref 1.9–3.5)
GLUCOSE SERPL-MCNC: 88 MG/DL (ref 65–99)
HBA1C MFR BLD: 5.4 % (ref 4–5.6)
HCT VFR BLD AUTO: 40 % (ref 37–47)
HCV AB SER QL: NORMAL
HDLC SERPL-MCNC: 56 MG/DL
HGB BLD-MCNC: 12.8 G/DL (ref 12–16)
IMM GRANULOCYTES # BLD AUTO: 0.01 K/UL (ref 0–0.11)
IMM GRANULOCYTES NFR BLD AUTO: 0.2 % (ref 0–0.9)
LDLC SERPL CALC-MCNC: 68 MG/DL
LYMPHOCYTES # BLD AUTO: 1.37 K/UL (ref 1–4.8)
LYMPHOCYTES NFR BLD: 28.4 % (ref 22–41)
MCH RBC QN AUTO: 25.2 PG (ref 27–33)
MCHC RBC AUTO-ENTMCNC: 32 G/DL (ref 33.6–35)
MCV RBC AUTO: 78.7 FL (ref 81.4–97.8)
MONOCYTES # BLD AUTO: 0.44 K/UL (ref 0–0.85)
MONOCYTES NFR BLD AUTO: 9.1 % (ref 0–13.4)
NEUTROPHILS # BLD AUTO: 2.84 K/UL (ref 2–7.15)
NEUTROPHILS NFR BLD: 58.8 % (ref 44–72)
NRBC # BLD AUTO: 0 K/UL
NRBC BLD-RTO: 0 /100 WBC
PLATELET # BLD AUTO: 261 K/UL (ref 164–446)
PMV BLD AUTO: 10.2 FL (ref 9–12.9)
POTASSIUM SERPL-SCNC: 3.9 MMOL/L (ref 3.6–5.5)
PROT SERPL-MCNC: 7.4 G/DL (ref 6–8.2)
RBC # BLD AUTO: 5.08 M/UL (ref 4.2–5.4)
SODIUM SERPL-SCNC: 139 MMOL/L (ref 135–145)
T4 FREE SERPL-MCNC: 1.2 NG/DL (ref 0.93–1.7)
TRIGL SERPL-MCNC: 65 MG/DL (ref 0–149)
TSH SERPL DL<=0.005 MIU/L-ACNC: 0.97 UIU/ML (ref 0.38–5.33)
WBC # BLD AUTO: 4.8 K/UL (ref 4.8–10.8)

## 2022-02-24 PROCEDURE — 36415 COLL VENOUS BLD VENIPUNCTURE: CPT

## 2022-02-24 PROCEDURE — 84439 ASSAY OF FREE THYROXINE: CPT

## 2022-02-24 PROCEDURE — 83036 HEMOGLOBIN GLYCOSYLATED A1C: CPT

## 2022-02-24 PROCEDURE — 85025 COMPLETE CBC W/AUTO DIFF WBC: CPT

## 2022-02-24 PROCEDURE — 84443 ASSAY THYROID STIM HORMONE: CPT

## 2022-02-24 PROCEDURE — 86803 HEPATITIS C AB TEST: CPT

## 2022-02-24 PROCEDURE — 80053 COMPREHEN METABOLIC PANEL: CPT

## 2022-02-24 PROCEDURE — 82306 VITAMIN D 25 HYDROXY: CPT

## 2022-02-24 PROCEDURE — 80061 LIPID PANEL: CPT

## 2022-02-24 NOTE — PROGRESS NOTES
Chief Complaint   Patient presents with   • Establish Care       Subjective:     HPI:   Merari Mcghee is a 26 y.o. female here to discuss the evaluation and management of:        Problem   Encounter to Establish Care    Patient here to establish care. Has not been followed by primary in the past except for OB with her prior pregnancies. Has decided to establish care secondary to fungal infection to her toes.      Onychomycosis    For about the last 3 months patient has noted that her toenails are changing colors and thickening. Nothing has helped this. She would like to pursue treatment for this.          ROS  See HPI       No Known Allergies    Current medicines (including changes today)  Current Outpatient Medications   Medication Sig Dispense Refill   • omeprazole (PRILOSEC) 20 MG delayed-release capsule Take 1 Capsule by mouth every day. 30 Capsule 0   • docusate sodium 100 MG Cap Take 100 mg by mouth 2 times a day as needed for Constipation. 60 Cap 3   • ibuprofen (MOTRIN) 800 MG Tab Take 1 Tab by mouth every 8 hours as needed (For cramping after delivery; do not give if patient is receiving ketorolac (Toradol)). 30 Tab 3   • ferrous sulfate 325 (65 Fe) MG tablet Take 1 Tab by mouth 3 times a day. 90 Tab 2   • Prenatal Multivit-Min-Fe-FA (PRENATAL VITAMINS PO) Take 1 Tab by mouth every day.       No current facility-administered medications for this visit.       Social History     Tobacco Use   • Smoking status: Never Smoker   • Smokeless tobacco: Never Used   Vaping Use   • Vaping Use: Never used   Substance Use Topics   • Alcohol use: No     Alcohol/week: 0.0 oz   • Drug use: No       Patient Active Problem List    Diagnosis Date Noted   • Onychomycosis 2022   • Encounter to establish care 2022   • Encounter for insertion of ParaGard IUD 2020   • Postpartum care following  delivery 2020       Family History   Problem Relation Age of Onset   • No Known Problems Mother    •  "No Known Problems Father    • No Known Problems Sister    • No Known Problems Sister    • Other Sister 2        unknown   • Other Brother 8        car accident           Objective:     /74 (BP Location: Left arm, Patient Position: Sitting, BP Cuff Size: Adult)   Pulse 79   Temp 36.1 °C (96.9 °F) (Temporal)   Resp 16   Ht 1.676 m (5' 6\")   Wt 75.7 kg (166 lb 12.8 oz)   SpO2 98%  Body mass index is 26.92 kg/m².    Physical Exam:  Physical Exam  Vitals reviewed.   Constitutional:       General: She is awake.      Appearance: Normal appearance. She is well-developed.   HENT:      Head: Normocephalic.      Nose: Nose normal.      Mouth/Throat:      Mouth: Mucous membranes are moist.      Pharynx: Oropharynx is clear. No oropharyngeal exudate.   Eyes:      Conjunctiva/sclera: Conjunctivae normal.      Pupils: Pupils are equal, round, and reactive to light.   Cardiovascular:      Rate and Rhythm: Normal rate and regular rhythm.      Heart sounds: Normal heart sounds.   Pulmonary:      Effort: Pulmonary effort is normal. No respiratory distress.      Breath sounds: Normal breath sounds. No wheezing.   Musculoskeletal:      Cervical back: Neck supple. No tenderness.   Feet:      Right foot:      Toenail Condition: Right toenails are abnormally thick. Fungal disease present.     Left foot:      Toenail Condition: Left toenails are abnormally thick. Fungal disease present.  Lymphadenopathy:      Cervical: No cervical adenopathy.   Skin:     General: Skin is warm and dry.   Neurological:      Mental Status: She is alert and oriented to person, place, and time.   Psychiatric:         Mood and Affect: Mood normal.         Behavior: Behavior normal. Behavior is cooperative.         Assessment and Plan:     The following treatment plan was discussed:    Problem List Items Addressed This Visit     Encounter to establish care     Discussed health history and maintenance   Flu vaccine - Provided  Colon Ca screening - Not " applicable   Mammogram- Not Applicable   Pap smear - Done within last 3 years   STD Screening- Declined   Preventative screening labs ordered   Will only follow up if there are abnormal lab values - otherwise follow up in 6 months          Onychomycosis     Acute-  Discussed treatment - Will obtain labs including liver enzymes prior to treatment.   Anticipate terbinafine 250mg po daily for 12 weeks once CMP completed and normal            Other Visit Diagnoses     Need for vaccination        Relevant Orders    INFLUENZA VACCINE QUAD INJ (PF)    Screening for endocrine, nutritional, metabolic and immunity disorder        Relevant Orders    HEMOGLOBIN A1C    FREE THYROXINE    TSH    Lipid Profile    Comp Metabolic Panel    CBC WITH DIFFERENTIAL    VITAMIN D,25 HYDROXY    HEP C VIRUS ANTIBODY          Any change or worsening of signs or symptoms, patient encouraged to follow-up or report to emergency room for further evaluation. Patient verbalizes understanding and agrees.    Follow-Up: Return in about 6 months (around 8/23/2022) for Follow up Labs.      PLEASE NOTE: This dictation was created using voice recognition software. I have made every reasonable attempt to correct obvious errors, but I expect that there are errors of grammar and possibly content that I did not discover before finalizing the note.

## 2022-02-28 DIAGNOSIS — E55.9 VITAMIN D DEFICIENCY: ICD-10-CM

## 2022-02-28 DIAGNOSIS — B35.1 ONYCHOMYCOSIS: ICD-10-CM

## 2022-02-28 RX ORDER — TERBINAFINE HYDROCHLORIDE 250 MG/1
250 TABLET ORAL DAILY
Qty: 30 TABLET | Refills: 3 | Status: SHIPPED | OUTPATIENT
Start: 2022-02-28 | End: 2023-04-26

## 2022-02-28 RX ORDER — ERGOCALCIFEROL 1.25 MG/1
50000 CAPSULE ORAL
Qty: 12 CAPSULE | Refills: 0 | Status: SHIPPED | OUTPATIENT
Start: 2022-02-28 | End: 2023-04-26

## 2022-05-31 ENCOUNTER — OFFICE VISIT (OUTPATIENT)
Dept: MEDICAL GROUP | Facility: MEDICAL CENTER | Age: 27
End: 2022-05-31
Attending: NURSE PRACTITIONER
Payer: COMMERCIAL

## 2022-05-31 VITALS
TEMPERATURE: 98 F | SYSTOLIC BLOOD PRESSURE: 102 MMHG | HEART RATE: 90 BPM | BODY MASS INDEX: 27.24 KG/M2 | WEIGHT: 169.5 LBS | OXYGEN SATURATION: 96 % | RESPIRATION RATE: 16 BRPM | DIASTOLIC BLOOD PRESSURE: 64 MMHG | HEIGHT: 66 IN

## 2022-05-31 DIAGNOSIS — B35.1 ONYCHOMYCOSIS: ICD-10-CM

## 2022-05-31 PROCEDURE — 99212 OFFICE O/P EST SF 10 MIN: CPT | Performed by: NURSE PRACTITIONER

## 2022-05-31 PROCEDURE — 99214 OFFICE O/P EST MOD 30 MIN: CPT | Performed by: NURSE PRACTITIONER

## 2022-05-31 ASSESSMENT — FIBROSIS 4 INDEX: FIB4 SCORE: 0.43

## 2022-06-01 NOTE — ASSESSMENT & PLAN NOTE
Ongoing-  Some improvement with therapy, will transition to topical to allow for the body to rest after 12 weeks of therapy.  Referral to podiatry for possible toenail removal for alternative treatment.

## 2022-06-01 NOTE — PROGRESS NOTES
Chief Complaint   Patient presents with   • Follow-Up   • Medication Refill       Subjective:     HPI:   Merari Mcghee is a 27 y.o. female here to discuss the evaluation and management of:      Problem   Onychomycosis    Patient completed 3 months or 12 weeks of terbinafine oral therapy.  Patient continues to have onychomycosis to 3 separate toenails spread over bilateral toes.  Patient states some improvement and would like to continue therapy.         ROS  See HPI     No Known Allergies    Current medicines (including changes today)  Current Outpatient Medications   Medication Sig Dispense Refill   • Terbinafine (LAMISIL ADVANCED) 1 % Gel Apply 1 Application topically 2 times a day. 12 g 1   • terbinafine (LAMISIL) 250 MG Tab Take 1 Tablet by mouth every day. For 12 weeks 30 Tablet 3   • ergocalciferol (DRISDOL) 51338 UNIT capsule Take 1 Capsule by mouth every 7 days. 12 Capsule 0   • docusate sodium 100 MG Cap Take 100 mg by mouth 2 times a day as needed for Constipation. 60 Cap 3   • ferrous sulfate 325 (65 Fe) MG tablet Take 1 Tab by mouth 3 times a day. 90 Tab 2     No current facility-administered medications for this visit.       Social History     Tobacco Use   • Smoking status: Never Smoker   • Smokeless tobacco: Never Used   Vaping Use   • Vaping Use: Never used   Substance Use Topics   • Alcohol use: No     Alcohol/week: 0.0 oz   • Drug use: No       Patient Active Problem List    Diagnosis Date Noted   • Encounter to establish care 2022   • Onychomycosis 2022   • Postpartum depression 2020   • Encounter for insertion of ParaGard IUD 2020   • Postpartum care following  delivery 2020       Family History   Problem Relation Age of Onset   • No Known Problems Mother    • No Known Problems Father    • No Known Problems Sister    • No Known Problems Sister    • Other Sister 2        unknown   • Other Brother 8        car accident           Objective:     /64  "(BP Location: Left arm, Patient Position: Sitting, BP Cuff Size: Adult)   Pulse 90   Temp 36.7 °C (98 °F) (Temporal)   Resp 16   Ht 1.676 m (5' 6\")   Wt 76.9 kg (169 lb 8 oz)   SpO2 96%  Body mass index is 27.36 kg/m².    Physical Exam:  Physical Exam  Vitals reviewed.   Constitutional:       General: She is awake.      Appearance: Normal appearance. She is well-developed.   HENT:      Head: Normocephalic.   Eyes:      Conjunctiva/sclera: Conjunctivae normal.   Cardiovascular:      Rate and Rhythm: Normal rate.   Pulmonary:      Effort: Pulmonary effort is normal. No respiratory distress.   Musculoskeletal:      Cervical back: Neck supple.   Feet:      Right foot:      Toenail Condition: Right toenails are abnormally thick. Fungal disease present.     Left foot:      Toenail Condition: Left toenails are abnormally thick. Fungal disease present.  Skin:     General: Skin is warm and dry.   Neurological:      Mental Status: She is alert and oriented to person, place, and time.   Psychiatric:         Mood and Affect: Mood normal.         Behavior: Behavior normal. Behavior is cooperative.              Assessment and Plan:     The following treatment plan was discussed:    Problem List Items Addressed This Visit     Onychomycosis     Ongoing-  Some improvement with therapy, will transition to topical to allow for the body to rest after 12 weeks of therapy.  Referral to podiatry for possible toenail removal for alternative treatment.           Relevant Medications    Terbinafine (LAMISIL ADVANCED) 1 % Gel    Other Relevant Orders    Referral to Podiatry          Any change or worsening of signs or symptoms, patient encouraged to follow-up or report to emergency room for further evaluation. Patient verbalizes understanding and agrees.    Follow-Up: As needed after podiatry     PLEASE NOTE: This dictation was created using voice recognition software. I have made every reasonable attempt to correct obvious errors, but " I expect that there are errors of grammar and possibly content that I did not discover before finalizing the note.

## 2023-04-26 ENCOUNTER — APPOINTMENT (OUTPATIENT)
Dept: RADIOLOGY | Facility: MEDICAL CENTER | Age: 28
DRG: 439 | End: 2023-04-26
Attending: EMERGENCY MEDICINE
Payer: COMMERCIAL

## 2023-04-26 ENCOUNTER — HOSPITAL ENCOUNTER (INPATIENT)
Facility: MEDICAL CENTER | Age: 28
LOS: 3 days | DRG: 439 | End: 2023-04-29
Attending: EMERGENCY MEDICINE | Admitting: STUDENT IN AN ORGANIZED HEALTH CARE EDUCATION/TRAINING PROGRAM
Payer: COMMERCIAL

## 2023-04-26 DIAGNOSIS — R74.01 TRANSAMINITIS: ICD-10-CM

## 2023-04-26 DIAGNOSIS — R17 ELEVATED BILIRUBIN: ICD-10-CM

## 2023-04-26 DIAGNOSIS — K85.90 ACUTE PANCREATITIS, UNSPECIFIED COMPLICATION STATUS, UNSPECIFIED PANCREATITIS TYPE: ICD-10-CM

## 2023-04-26 LAB
ALBUMIN SERPL BCP-MCNC: 4.3 G/DL (ref 3.2–4.9)
ALBUMIN/GLOB SERPL: 1.3 G/DL
ALP SERPL-CCNC: 142 U/L (ref 30–99)
ALT SERPL-CCNC: 280 U/L (ref 2–50)
ANION GAP SERPL CALC-SCNC: 11 MMOL/L (ref 7–16)
APPEARANCE UR: ABNORMAL
AST SERPL-CCNC: 431 U/L (ref 12–45)
BACTERIA #/AREA URNS HPF: ABNORMAL /HPF
BASOPHILS # BLD AUTO: 0.3 % (ref 0–1.8)
BASOPHILS # BLD: 0.03 K/UL (ref 0–0.12)
BILIRUB SERPL-MCNC: 2.7 MG/DL (ref 0.1–1.5)
BILIRUB UR QL STRIP.AUTO: ABNORMAL
BUN SERPL-MCNC: 11 MG/DL (ref 8–22)
CALCIUM ALBUM COR SERPL-MCNC: 8.5 MG/DL (ref 8.5–10.5)
CALCIUM SERPL-MCNC: 8.7 MG/DL (ref 8.5–10.5)
CHLORIDE SERPL-SCNC: 107 MMOL/L (ref 96–112)
CO2 SERPL-SCNC: 22 MMOL/L (ref 20–33)
COLOR UR: ABNORMAL
CREAT SERPL-MCNC: 0.52 MG/DL (ref 0.5–1.4)
EOSINOPHIL # BLD AUTO: 0.05 K/UL (ref 0–0.51)
EOSINOPHIL NFR BLD: 0.6 % (ref 0–6.9)
EPI CELLS #/AREA URNS HPF: ABNORMAL /HPF
ERYTHROCYTE [DISTWIDTH] IN BLOOD BY AUTOMATED COUNT: 42.4 FL (ref 35.9–50)
GFR SERPLBLD CREATININE-BSD FMLA CKD-EPI: 129 ML/MIN/1.73 M 2
GLOBULIN SER CALC-MCNC: 3.3 G/DL (ref 1.9–3.5)
GLUCOSE SERPL-MCNC: 107 MG/DL (ref 65–99)
GLUCOSE UR STRIP.AUTO-MCNC: NEGATIVE MG/DL
HCG SERPL QL: NEGATIVE
HCT VFR BLD AUTO: 41.5 % (ref 37–47)
HGB BLD-MCNC: 13.5 G/DL (ref 12–16)
HYALINE CASTS #/AREA URNS LPF: ABNORMAL /LPF
IMM GRANULOCYTES # BLD AUTO: 0.05 K/UL (ref 0–0.11)
IMM GRANULOCYTES NFR BLD AUTO: 0.6 % (ref 0–0.9)
KETONES UR STRIP.AUTO-MCNC: ABNORMAL MG/DL
LEUKOCYTE ESTERASE UR QL STRIP.AUTO: ABNORMAL
LIPASE SERPL-CCNC: 351 U/L (ref 11–82)
LYMPHOCYTES # BLD AUTO: 0.79 K/UL (ref 1–4.8)
LYMPHOCYTES NFR BLD: 8.8 % (ref 22–41)
MCH RBC QN AUTO: 26 PG (ref 27–33)
MCHC RBC AUTO-ENTMCNC: 32.5 G/DL (ref 33.6–35)
MCV RBC AUTO: 80 FL (ref 81.4–97.8)
MICRO URNS: ABNORMAL
MONOCYTES # BLD AUTO: 0.49 K/UL (ref 0–0.85)
MONOCYTES NFR BLD AUTO: 5.5 % (ref 0–13.4)
MUCOUS THREADS #/AREA URNS HPF: ABNORMAL /HPF
NEUTROPHILS # BLD AUTO: 7.54 K/UL (ref 2–7.15)
NEUTROPHILS NFR BLD: 84.2 % (ref 44–72)
NITRITE UR QL STRIP.AUTO: POSITIVE
NRBC # BLD AUTO: 0 K/UL
NRBC BLD-RTO: 0 /100 WBC
PH UR STRIP.AUTO: 5.5 [PH] (ref 5–8)
PLATELET # BLD AUTO: 279 K/UL (ref 164–446)
PMV BLD AUTO: 9.3 FL (ref 9–12.9)
POTASSIUM SERPL-SCNC: 3.7 MMOL/L (ref 3.6–5.5)
PROT SERPL-MCNC: 7.6 G/DL (ref 6–8.2)
PROT UR QL STRIP: 30 MG/DL
RBC # BLD AUTO: 5.19 M/UL (ref 4.2–5.4)
RBC # URNS HPF: >150 /HPF
RBC UR QL AUTO: ABNORMAL
SODIUM SERPL-SCNC: 140 MMOL/L (ref 135–145)
SP GR UR STRIP.AUTO: 1.04
UROBILINOGEN UR STRIP.AUTO-MCNC: 1 MG/DL
WBC # BLD AUTO: 9 K/UL (ref 4.8–10.8)
WBC #/AREA URNS HPF: ABNORMAL /HPF

## 2023-04-26 PROCEDURE — 99285 EMERGENCY DEPT VISIT HI MDM: CPT

## 2023-04-26 PROCEDURE — 84703 CHORIONIC GONADOTROPIN ASSAY: CPT

## 2023-04-26 PROCEDURE — 36415 COLL VENOUS BLD VENIPUNCTURE: CPT

## 2023-04-26 PROCEDURE — 770001 HCHG ROOM/CARE - MED/SURG/GYN PRIV*

## 2023-04-26 PROCEDURE — A9270 NON-COVERED ITEM OR SERVICE: HCPCS | Performed by: EMERGENCY MEDICINE

## 2023-04-26 PROCEDURE — 700105 HCHG RX REV CODE 258: Mod: UD | Performed by: EMERGENCY MEDICINE

## 2023-04-26 PROCEDURE — 76705 ECHO EXAM OF ABDOMEN: CPT

## 2023-04-26 PROCEDURE — 700111 HCHG RX REV CODE 636 W/ 250 OVERRIDE (IP): Mod: UD | Performed by: EMERGENCY MEDICINE

## 2023-04-26 PROCEDURE — 770006 HCHG ROOM/CARE - MED/SURG/GYN SEMI*

## 2023-04-26 PROCEDURE — 81001 URINALYSIS AUTO W/SCOPE: CPT

## 2023-04-26 PROCEDURE — 700102 HCHG RX REV CODE 250 W/ 637 OVERRIDE(OP): Performed by: EMERGENCY MEDICINE

## 2023-04-26 PROCEDURE — 83690 ASSAY OF LIPASE: CPT

## 2023-04-26 PROCEDURE — 96375 TX/PRO/DX INJ NEW DRUG ADDON: CPT

## 2023-04-26 PROCEDURE — 85025 COMPLETE CBC W/AUTO DIFF WBC: CPT

## 2023-04-26 PROCEDURE — 80053 COMPREHEN METABOLIC PANEL: CPT

## 2023-04-26 PROCEDURE — 96374 THER/PROPH/DIAG INJ IV PUSH: CPT

## 2023-04-26 RX ORDER — PROMETHAZINE HYDROCHLORIDE 25 MG/1
12.5-25 SUPPOSITORY RECTAL EVERY 4 HOURS PRN
Status: DISCONTINUED | OUTPATIENT
Start: 2023-04-26 | End: 2023-04-29 | Stop reason: HOSPADM

## 2023-04-26 RX ORDER — SODIUM CHLORIDE, SODIUM LACTATE, POTASSIUM CHLORIDE, CALCIUM CHLORIDE 600; 310; 30; 20 MG/100ML; MG/100ML; MG/100ML; MG/100ML
1000 INJECTION, SOLUTION INTRAVENOUS ONCE
Status: COMPLETED | OUTPATIENT
Start: 2023-04-26 | End: 2023-04-26

## 2023-04-26 RX ORDER — ONDANSETRON 4 MG/1
4 TABLET, ORALLY DISINTEGRATING ORAL EVERY 4 HOURS PRN
Status: DISCONTINUED | OUTPATIENT
Start: 2023-04-26 | End: 2023-04-29 | Stop reason: HOSPADM

## 2023-04-26 RX ORDER — ONDANSETRON 2 MG/ML
4 INJECTION INTRAMUSCULAR; INTRAVENOUS EVERY 4 HOURS PRN
Status: DISCONTINUED | OUTPATIENT
Start: 2023-04-26 | End: 2023-04-29 | Stop reason: HOSPADM

## 2023-04-26 RX ORDER — HYDROMORPHONE HYDROCHLORIDE 1 MG/ML
1 INJECTION, SOLUTION INTRAMUSCULAR; INTRAVENOUS; SUBCUTANEOUS EVERY 4 HOURS PRN
Status: DISCONTINUED | OUTPATIENT
Start: 2023-04-26 | End: 2023-04-29 | Stop reason: HOSPADM

## 2023-04-26 RX ORDER — PROMETHAZINE HYDROCHLORIDE 25 MG/1
12.5-25 TABLET ORAL EVERY 4 HOURS PRN
Status: DISCONTINUED | OUTPATIENT
Start: 2023-04-26 | End: 2023-04-29 | Stop reason: HOSPADM

## 2023-04-26 RX ORDER — ONDANSETRON 2 MG/ML
4 INJECTION INTRAMUSCULAR; INTRAVENOUS ONCE
Status: COMPLETED | OUTPATIENT
Start: 2023-04-26 | End: 2023-04-26

## 2023-04-26 RX ORDER — SODIUM CHLORIDE 9 MG/ML
INJECTION, SOLUTION INTRAVENOUS CONTINUOUS
Status: DISCONTINUED | OUTPATIENT
Start: 2023-04-26 | End: 2023-04-29 | Stop reason: HOSPADM

## 2023-04-26 RX ORDER — PROCHLORPERAZINE EDISYLATE 5 MG/ML
5-10 INJECTION INTRAMUSCULAR; INTRAVENOUS EVERY 4 HOURS PRN
Status: DISCONTINUED | OUTPATIENT
Start: 2023-04-26 | End: 2023-04-29 | Stop reason: HOSPADM

## 2023-04-26 RX ORDER — SODIUM CHLORIDE, SODIUM LACTATE, POTASSIUM CHLORIDE, AND CALCIUM CHLORIDE .6; .31; .03; .02 G/100ML; G/100ML; G/100ML; G/100ML
1500 INJECTION, SOLUTION INTRAVENOUS ONCE
Status: COMPLETED | OUTPATIENT
Start: 2023-04-26 | End: 2023-04-27

## 2023-04-26 RX ADMIN — FAMOTIDINE 20 MG: 10 INJECTION, SOLUTION INTRAVENOUS at 17:29

## 2023-04-26 RX ADMIN — ONDANSETRON HYDROCHLORIDE 4 MG: 2 SOLUTION INTRAMUSCULAR; INTRAVENOUS at 17:29

## 2023-04-26 RX ADMIN — SODIUM CHLORIDE, POTASSIUM CHLORIDE, SODIUM LACTATE AND CALCIUM CHLORIDE 1000 ML: 600; 310; 30; 20 INJECTION, SOLUTION INTRAVENOUS at 17:29

## 2023-04-26 RX ADMIN — LIDOCAINE HYDROCHLORIDE 30 ML: 20 SOLUTION OROPHARYNGEAL at 17:29

## 2023-04-26 ASSESSMENT — FIBROSIS 4 INDEX
FIB4 SCORE: .4444444444444444444
FIB4 SCORE: 2.68

## 2023-04-26 NOTE — ED TRIAGE NOTES
Chief Complaint   Patient presents with    Abdominal Pain     Pt bib remsa c/o mid abdominal pain x 2 days. Describes pain as cramping. Also reported n/v/d all day. Unable to tolerate po. Rates pain as 7/10. NAD.     Denies dysuria.    Vitals:    04/26/23 1524   BP: 114/68   Pulse: 70   Resp: 16   Temp: 37.3 °C (99.2 °F)   SpO2: 98%

## 2023-04-27 ENCOUNTER — APPOINTMENT (OUTPATIENT)
Dept: RADIOLOGY | Facility: MEDICAL CENTER | Age: 28
DRG: 439 | End: 2023-04-27
Attending: FAMILY MEDICINE
Payer: COMMERCIAL

## 2023-04-27 PROBLEM — N39.0 UTI (URINARY TRACT INFECTION): Status: ACTIVE | Noted: 2023-04-27

## 2023-04-27 PROBLEM — R74.01 TRANSAMINITIS: Status: ACTIVE | Noted: 2023-04-27

## 2023-04-27 PROBLEM — D64.9 ANEMIA: Status: ACTIVE | Noted: 2023-04-27

## 2023-04-27 PROBLEM — D72.819 LEUKOPENIA: Status: ACTIVE | Noted: 2023-04-27

## 2023-04-27 PROBLEM — R11.2 INTRACTABLE NAUSEA AND VOMITING: Status: ACTIVE | Noted: 2023-04-27

## 2023-04-27 PROBLEM — E87.6 HYPOKALEMIA: Status: ACTIVE | Noted: 2023-04-27

## 2023-04-27 LAB
ALBUMIN SERPL BCP-MCNC: 3.3 G/DL (ref 3.2–4.9)
ALBUMIN/GLOB SERPL: 1.4 G/DL
ALP SERPL-CCNC: 156 U/L (ref 30–99)
ALT SERPL-CCNC: 726 U/L (ref 2–50)
ANION GAP SERPL CALC-SCNC: 8 MMOL/L (ref 7–16)
AST SERPL-CCNC: 539 U/L (ref 12–45)
BILIRUB SERPL-MCNC: 2.1 MG/DL (ref 0.1–1.5)
BUN SERPL-MCNC: 8 MG/DL (ref 8–22)
CALCIUM ALBUM COR SERPL-MCNC: 8.5 MG/DL (ref 8.5–10.5)
CALCIUM SERPL-MCNC: 7.9 MG/DL (ref 8.5–10.5)
CHLORIDE SERPL-SCNC: 111 MMOL/L (ref 96–112)
CO2 SERPL-SCNC: 23 MMOL/L (ref 20–33)
CREAT SERPL-MCNC: 0.63 MG/DL (ref 0.5–1.4)
CRP SERPL HS-MCNC: 0.61 MG/DL (ref 0–0.75)
ERYTHROCYTE [DISTWIDTH] IN BLOOD BY AUTOMATED COUNT: 41.1 FL (ref 35.9–50)
FLUAV RNA SPEC QL NAA+PROBE: NEGATIVE
FLUBV RNA SPEC QL NAA+PROBE: NEGATIVE
GFR SERPLBLD CREATININE-BSD FMLA CKD-EPI: 123 ML/MIN/1.73 M 2
GLOBULIN SER CALC-MCNC: 2.4 G/DL (ref 1.9–3.5)
GLUCOSE SERPL-MCNC: 98 MG/DL (ref 65–99)
HAV IGM SERPL QL IA: NORMAL
HBV CORE IGM SER QL: NORMAL
HBV SURFACE AG SER QL: NORMAL
HCT VFR BLD AUTO: 31.9 % (ref 37–47)
HCV AB SER QL: NORMAL
HGB BLD-MCNC: 10.7 G/DL (ref 12–16)
INR PPP: 1.2 (ref 0.87–1.13)
LIPASE SERPL-CCNC: 37 U/L (ref 11–82)
MCH RBC QN AUTO: 26.2 PG (ref 27–33)
MCHC RBC AUTO-ENTMCNC: 33.5 G/DL (ref 33.6–35)
MCV RBC AUTO: 78 FL (ref 81.4–97.8)
PLATELET # BLD AUTO: 182 K/UL (ref 164–446)
PMV BLD AUTO: 9.4 FL (ref 9–12.9)
POTASSIUM SERPL-SCNC: 3.5 MMOL/L (ref 3.6–5.5)
PROT SERPL-MCNC: 5.7 G/DL (ref 6–8.2)
PROTHROMBIN TIME: 15 SEC (ref 12–14.6)
RBC # BLD AUTO: 4.09 M/UL (ref 4.2–5.4)
RSV RNA SPEC QL NAA+PROBE: NEGATIVE
SARS-COV-2 RNA RESP QL NAA+PROBE: NOTDETECTED
SODIUM SERPL-SCNC: 142 MMOL/L (ref 135–145)
SPECIMEN SOURCE: NORMAL
WBC # BLD AUTO: 3.3 K/UL (ref 4.8–10.8)

## 2023-04-27 PROCEDURE — C9803 HOPD COVID-19 SPEC COLLECT: HCPCS | Performed by: FAMILY MEDICINE

## 2023-04-27 PROCEDURE — 700102 HCHG RX REV CODE 250 W/ 637 OVERRIDE(OP): Performed by: FAMILY MEDICINE

## 2023-04-27 PROCEDURE — A9270 NON-COVERED ITEM OR SERVICE: HCPCS | Performed by: FAMILY MEDICINE

## 2023-04-27 PROCEDURE — 86140 C-REACTIVE PROTEIN: CPT

## 2023-04-27 PROCEDURE — 85652 RBC SED RATE AUTOMATED: CPT

## 2023-04-27 PROCEDURE — 83690 ASSAY OF LIPASE: CPT

## 2023-04-27 PROCEDURE — 80074 ACUTE HEPATITIS PANEL: CPT

## 2023-04-27 PROCEDURE — 99223 1ST HOSP IP/OBS HIGH 75: CPT | Performed by: STUDENT IN AN ORGANIZED HEALTH CARE EDUCATION/TRAINING PROGRAM

## 2023-04-27 PROCEDURE — 80053 COMPREHEN METABOLIC PANEL: CPT

## 2023-04-27 PROCEDURE — 85027 COMPLETE CBC AUTOMATED: CPT

## 2023-04-27 PROCEDURE — 36415 COLL VENOUS BLD VENIPUNCTURE: CPT

## 2023-04-27 PROCEDURE — 770001 HCHG ROOM/CARE - MED/SURG/GYN PRIV*

## 2023-04-27 PROCEDURE — 74181 MRI ABDOMEN W/O CONTRAST: CPT

## 2023-04-27 PROCEDURE — 700101 HCHG RX REV CODE 250: Performed by: FAMILY MEDICINE

## 2023-04-27 PROCEDURE — 770006 HCHG ROOM/CARE - MED/SURG/GYN SEMI*

## 2023-04-27 PROCEDURE — 700105 HCHG RX REV CODE 258: Performed by: STUDENT IN AN ORGANIZED HEALTH CARE EDUCATION/TRAINING PROGRAM

## 2023-04-27 PROCEDURE — 700111 HCHG RX REV CODE 636 W/ 250 OVERRIDE (IP): Performed by: FAMILY MEDICINE

## 2023-04-27 PROCEDURE — 0241U HCHG SARS-COV-2 COVID-19 NFCT DS RESP RNA 4 TRGT MIC: CPT

## 2023-04-27 PROCEDURE — 85610 PROTHROMBIN TIME: CPT

## 2023-04-27 RX ORDER — POTASSIUM CHLORIDE 20 MEQ/1
20 TABLET, EXTENDED RELEASE ORAL DAILY
Status: DISCONTINUED | OUTPATIENT
Start: 2023-04-27 | End: 2023-04-29 | Stop reason: HOSPADM

## 2023-04-27 RX ORDER — OXYCODONE HYDROCHLORIDE 5 MG/1
5-10 TABLET ORAL EVERY 4 HOURS PRN
Status: DISCONTINUED | OUTPATIENT
Start: 2023-04-27 | End: 2023-04-29 | Stop reason: HOSPADM

## 2023-04-27 RX ORDER — IBUPROFEN 400 MG/1
600 TABLET ORAL EVERY 6 HOURS PRN
Status: DISCONTINUED | OUTPATIENT
Start: 2023-04-27 | End: 2023-04-29 | Stop reason: HOSPADM

## 2023-04-27 RX ORDER — OXYCODONE HYDROCHLORIDE 10 MG/1
10 TABLET ORAL EVERY 4 HOURS PRN
Status: DISCONTINUED | OUTPATIENT
Start: 2023-04-27 | End: 2023-04-27

## 2023-04-27 RX ADMIN — POTASSIUM CHLORIDE 20 MEQ: 1500 TABLET, EXTENDED RELEASE ORAL at 10:50

## 2023-04-27 RX ADMIN — SODIUM CHLORIDE: 9 INJECTION, SOLUTION INTRAVENOUS at 06:27

## 2023-04-27 RX ADMIN — SODIUM CHLORIDE, POTASSIUM CHLORIDE, SODIUM LACTATE AND CALCIUM CHLORIDE 1500 ML: 600; 310; 30; 20 INJECTION, SOLUTION INTRAVENOUS at 00:13

## 2023-04-27 RX ADMIN — IBUPROFEN 600 MG: 400 TABLET, FILM COATED ORAL at 16:10

## 2023-04-27 RX ADMIN — CEFTRIAXONE SODIUM 1000 MG: 10 INJECTION, POWDER, FOR SOLUTION INTRAVENOUS at 12:35

## 2023-04-27 RX ADMIN — SODIUM CHLORIDE: 9 INJECTION, SOLUTION INTRAVENOUS at 21:19

## 2023-04-27 RX ADMIN — SODIUM CHLORIDE: 9 INJECTION, SOLUTION INTRAVENOUS at 12:35

## 2023-04-27 ASSESSMENT — COGNITIVE AND FUNCTIONAL STATUS - GENERAL
SUGGESTED CMS G CODE MODIFIER DAILY ACTIVITY: CH
SUGGESTED CMS G CODE MODIFIER MOBILITY: CH
MOBILITY SCORE: 24
DAILY ACTIVITIY SCORE: 24

## 2023-04-27 ASSESSMENT — PAIN DESCRIPTION - PAIN TYPE
TYPE: ACUTE PAIN
TYPE: ACUTE PAIN

## 2023-04-27 ASSESSMENT — ENCOUNTER SYMPTOMS
DEPRESSION: 0
ABDOMINAL PAIN: 1
NAUSEA: 1
NAUSEA: 0
HEMOPTYSIS: 0
BLURRED VISION: 0
FOCAL WEAKNESS: 0
MYALGIAS: 0
WEAKNESS: 1
SORE THROAT: 0
FLANK PAIN: 0
CHILLS: 0
DIAPHORESIS: 0
HEADACHES: 0
BRUISES/BLEEDS EASILY: 0
DIARRHEA: 0
BACK PAIN: 0
DIZZINESS: 0
SHORTNESS OF BREATH: 0
COUGH: 0
PALPITATIONS: 0
DOUBLE VISION: 0
HEARTBURN: 0
FEVER: 0
SENSORY CHANGE: 0
WHEEZING: 0
SPEECH CHANGE: 0
VOMITING: 1
VOMITING: 0
NERVOUS/ANXIOUS: 0
NECK PAIN: 0

## 2023-04-27 ASSESSMENT — LIFESTYLE VARIABLES
HAVE PEOPLE ANNOYED YOU BY CRITICIZING YOUR DRINKING: NO
HOW MANY TIMES IN THE PAST YEAR HAVE YOU HAD 5 OR MORE DRINKS IN A DAY: 0
CONSUMPTION TOTAL: NEGATIVE
TOTAL SCORE: 0
DOES PATIENT WANT TO STOP DRINKING: NO
TOTAL SCORE: 0
ON A TYPICAL DAY WHEN YOU DRINK ALCOHOL HOW MANY DRINKS DO YOU HAVE: 0
TOTAL SCORE: 0
HAVE YOU EVER FELT YOU SHOULD CUT DOWN ON YOUR DRINKING: NO
EVER HAD A DRINK FIRST THING IN THE MORNING TO STEADY YOUR NERVES TO GET RID OF A HANGOVER: NO
EVER FELT BAD OR GUILTY ABOUT YOUR DRINKING: NO
ALCOHOL_USE: NO
AVERAGE NUMBER OF DAYS PER WEEK YOU HAVE A DRINK CONTAINING ALCOHOL: 0

## 2023-04-27 ASSESSMENT — FIBROSIS 4 INDEX: FIB4 SCORE: 2.68

## 2023-04-27 NOTE — PROGRESS NOTES
Hospital Medicine Daily Progress Note    Date of Service  4/27/2023    Chief Complaint  Merari Mcghee is a 28 y.o. female admitted 4/26/2023 with pancreatitis    Hospital Course  Admitted with possible pancreatitis, elevated LFTs.  History of cholecystectomy.  Patient was started on IVF hydration.    Interval Problem Update  Pancreatitis - less pain, lipase trended down  Elev LFTs - hepatitis panel negative, MRCP pending  UTI - positive urine, mild dysuria  Leukopenia - wbc 3300  Low potassium    A qualified  was used to interpret   during this encounter.  ’s name/ID number was [id number]  and mode of interpretation was iPAD.    I have discussed this patient's plan of care and discharge plan at IDT rounds today with Case Management, Nursing, Nursing leadership, and other members of the IDT team.    Consultants/Specialty  None    Code Status  Full Code    Disposition  Patient is not medically cleared for discharge.   Anticipate discharge to to home with close outpatient follow-up.  I have placed the appropriate orders for post-discharge needs.    Review of Systems  Review of Systems   Constitutional:  Positive for malaise/fatigue. Negative for chills, diaphoresis and fever.   HENT:  Negative for congestion, hearing loss and sore throat.    Eyes:  Negative for blurred vision.   Respiratory:  Negative for cough, shortness of breath and wheezing.    Cardiovascular:  Negative for chest pain, palpitations and leg swelling.   Gastrointestinal:  Positive for abdominal pain. Negative for diarrhea, heartburn, nausea and vomiting.   Genitourinary:  Positive for dysuria. Negative for flank pain and hematuria.   Musculoskeletal:  Negative for back pain, joint pain, myalgias and neck pain.   Skin:  Negative for rash.   Neurological:  Positive for weakness. Negative for dizziness, sensory change, speech change, focal weakness and headaches.   Psychiatric/Behavioral:  The patient is not  nervous/anxious.       Physical Exam  Temp:  [36.5 °C (97.7 °F)-37.3 °C (99.2 °F)] 36.5 °C (97.7 °F)  Pulse:  [60-73] 62  Resp:  [16-18] 18  BP: ()/(48-68) 102/50  SpO2:  [92 %-99 %] 97 %    Physical Exam  Vitals and nursing note reviewed.   HENT:      Head: Normocephalic and atraumatic.      Nose: No congestion.      Mouth/Throat:      Mouth: Mucous membranes are moist.   Eyes:      Extraocular Movements: Extraocular movements intact.      Conjunctiva/sclera: Conjunctivae normal.   Cardiovascular:      Rate and Rhythm: Normal rate and regular rhythm.   Pulmonary:      Effort: Pulmonary effort is normal.      Breath sounds: Normal breath sounds.   Abdominal:      General: There is no distension.      Tenderness: There is abdominal tenderness. There is no right CVA tenderness, left CVA tenderness, guarding or rebound.   Musculoskeletal:      Cervical back: No tenderness.      Right lower leg: No edema.      Left lower leg: No edema.   Skin:     General: Skin is warm and dry.   Neurological:      General: No focal deficit present.      Mental Status: She is alert and oriented to person, place, and time.      Cranial Nerves: No cranial nerve deficit.       Fluids    Intake/Output Summary (Last 24 hours) at 4/27/2023 1046  Last data filed at 4/26/2023 1829  Gross per 24 hour   Intake 1000 ml   Output --   Net 1000 ml       Laboratory  Recent Labs     04/26/23  1711 04/27/23  0718   WBC 9.0 3.3*   RBC 5.19 4.09*   HEMOGLOBIN 13.5 10.7*   HEMATOCRIT 41.5 31.9*   MCV 80.0* 78.0*   MCH 26.0* 26.2*   MCHC 32.5* 33.5*   RDW 42.4 41.1   PLATELETCT 279 182   MPV 9.3 9.4     Recent Labs     04/26/23  1711 04/27/23  0719   SODIUM 140 142   POTASSIUM 3.7 3.5*   CHLORIDE 107 111   CO2 22 23   GLUCOSE 107* 98   BUN 11 8   CREATININE 0.52 0.63   CALCIUM 8.7 7.9*     Recent Labs     04/27/23  0718   INR 1.20*               Imaging  [id number]   Final Result      1. Mildly prominent CBD could relate to post cholecystectomy status.    2. Homogeneous liver with no discrete mass.         [id number]-H/O    (Results Pending)        Assessment/Plan  * Pancreatitis, unspecified pancreatitis type- (present on admission)  Assessment & Plan  IVF hydration  MRCP pending  Check lipid panel    UTI (urinary tract infection)- (present on admission)  Assessment & Plan  Start IV Rocephin  Follow culture    Transaminitis- (present on admission)  Assessment & Plan  IVF hydration  MRCP pending  May need autoimmune hepatitis work-up      Intractable nausea and vomiting- (present on admission)  Assessment & Plan  IVF hydration    Hypokalemia- (present on admission)  Assessment & Plan  Start Kdur  Follow cmp, Mg, Ph    Anemia- (present on admission)  Assessment & Plan  Check iron/tibc, b12, TSH  Follow cbc    Leukopenia- (present on admission)  Assessment & Plan  Check iron/tibc, b12, TSH  Follow cbc         VTE prophylaxis: SCDs/TEDs    I have performed a physical exam and reviewed and updated ROS and Plan today (4/27/2023). In review of yesterday's note (4/26/2023), there are no changes except as documented above.

## 2023-04-27 NOTE — ED PROVIDER NOTES
ED Provider Note    CHIEF COMPLAINT  Chief Complaint   Patient presents with    Abdominal Pain     Pt bib remsa c/o mid abdominal pain x 2 days. Describes pain as cramping. Also reported n/v/d all day. Unable to tolerate po. Rates pain as 7/10. NAD.       EXTERNAL RECORDS REVIEWED  Outpatient Notes patient with primary care visit May 2022 for onychomycosis as well as prior emergency department visit for upper abdominal pain with unremarkable labs in 2021    HPI/ROS  LIMITATION TO HISTORY   Limitation to history with language,  used  OUTSIDE HISTORIAN(S):  none    Merari Mcghee is a 28 y.o. female who presents with abdominal pain, nausea vomiting and diarrhea as well.  Patient reports that when she woke this morning she began to feel nauseated and had some vomiting and diarrhea, nonbloody.  Later in the day she began to have some sharp and crampy upper abdominal pain.  No real radiation to the pain or alleviating or aggravating factors.  She has had prior similar episodes  She reports no fevers or chills, no recent travel or antibiotic use.  Prior history of cholecystectomy    PAST MEDICAL HISTORY       SURGICAL HISTORY   has a past surgical history that includes beatriz by laparoscopy (N/A, 2017); other;  delivery only (N/A, 2019); and primary c section (2019).    FAMILY HISTORY  Family History   Problem Relation Age of Onset    No Known Problems Mother     No Known Problems Father     No Known Problems Sister     No Known Problems Sister     Other Sister 2        unknown    Other Brother 8        car accident        SOCIAL HISTORY  Social History     Tobacco Use    Smoking status: Never    Smokeless tobacco: Never   Vaping Use    Vaping Use: Never used   Substance and Sexual Activity    Alcohol use: No     Alcohol/week: 0.0 oz    Drug use: No    Sexual activity: Yes     Partners: Male     Birth control/protection: I.U.D.     Comment: None        CURRENT  "MEDICATIONS  Home Medications       Reviewed by Larry Livingston (Pharmacy Tech) on 04/26/23 at 2134  Med List Status: Complete     Medication Last Dose Status        Patient Edison Taking any Medications                           ALLERGIES  No Known Allergies    PHYSICAL EXAM  VITAL SIGNS: BP 94/55   Pulse 73   Temp 37.3 °C (99.2 °F) (Temporal)   Resp 16   Ht 1.676 m (5' 6\")   Wt 72.6 kg (160 lb)   LMP 04/25/2023   SpO2 98%   BMI 25.82 kg/m²      Pulse ox interpretation: I interpret this pulse ox as normal.  Constitutional: Alert in no apparent distress.  HENT: No signs of trauma, Bilateral external ears normal, Nose normal.   Eyes: Pupils are equal and reactive, Conjunctiva normal, Non-icteric.   Neck: Normal range of motion, No tenderness, Supple, No stridor.   Cardiovascular: Regular rate and rhythm, no murmurs.   Thorax & Lungs: Normal breath sounds, No respiratory distress, No wheezing, No chest tenderness.   Abdomen: Bowel sounds normal, Soft, mild epigastric tenderness, No masses, No pulsatile masses. No peritoneal signs.  Skin: Warm, Dry, No erythema, No rash.   Back: No bony tenderness, No CVA tenderness.   Extremities: Intact distal pulses, No edema, No tenderness, No cyanosis,  Negative Michelle's sign.   Musculoskeletal: Good range of motion in all major joints. No tenderness to palpation or major deformities noted.   Neurologic: Alert , Normal motor function, Normal sensory function, No focal deficits noted.   Psychiatric: Affect normal, Judgment normal, Mood normal.               DIAGNOSTIC STUDIES / PROCEDURES  Labs Reviewed   CBC WITH DIFFERENTIAL - Abnormal; Notable for the following components:       Result Value    MCV 80.0 (*)     MCH 26.0 (*)     MCHC 32.5 (*)     Neutrophils-Polys 84.20 (*)     Lymphocytes 8.80 (*)     Neutrophils (Absolute) 7.54 (*)     Lymphs (Absolute) 0.79 (*)     All other components within normal limits   COMP METABOLIC PANEL - Abnormal; Notable for the " following components:    Glucose 107 (*)     AST(SGOT) 431 (*)     ALT(SGPT) 280 (*)     Alkaline Phosphatase 142 (*)     Total Bilirubin 2.7 (*)     All other components within normal limits   LIPASE - Abnormal; Notable for the following components:    Lipase 351 (*)     All other components within normal limits   URINALYSIS,CULTURE IF INDICATED - Abnormal; Notable for the following components:    Character Cloudy (*)     Ketones Trace (*)     Protein 30 (*)     Bilirubin Moderate (*)     Nitrite Positive (*)     Leukocyte Esterase Trace (*)     Occult Blood Large (*)     All other components within normal limits    Narrative:     Indication for culture:->Patient WITHOUT an indwelling Maynard  catheter in place with new onset of Dysuria, Frequency,  Urgency, and/or Suprapubic pain   URINE MICROSCOPIC (W/UA) - Abnormal; Notable for the following components:    RBC >150 (*)     Bacteria Few (*)     All other components within normal limits    Narrative:     Indication for culture:->Patient WITHOUT an indwelling Maynard  catheter in place with new onset of Dysuria, Frequency,  Urgency, and/or Suprapubic pain   HCG QUAL SERUM   CORRECTED CALCIUM   ESTIMATED GFR         RADIOLOGY  I have independently interpreted the diagnostic imaging associated with this visit and am waiting the final reading from the radiologist.   My preliminary interpretation is as follows: Prior cholecystectomy  Radiologist interpretation:   US-RUQ   Final Result      1. Mildly prominent CBD could relate to post cholecystectomy status.   2. Homogeneous liver with no discrete mass.               COURSE & MEDICAL DECISION MAKING      INITIAL ASSESSMENT, COURSE AND PLAN  Care Narrative: 5:23 PM  Patient presenting with abdominal pain.  At this point differential includes but is not limited to acute pathology such as gastroenteritis, metabolic disturbance, pancreatitis, gastritis, seems less likely perforation obstruction given her exam, and treat with IV  fluids, GI cocktail, famotidine for symptoms.  Diagnostic labs ordered additionally we will plan for observation to monitor symptoms and treatment and serial abdominal examinations.    6:27 PM  Patient is reevaluated, she reports she is currently comfortable, the nausea appears well controlled, pending ultrasound    7:30 PM  Patient is reevaluated again, she is comfortable, discussed plan for hospitalization to which she is agreeable    HYDRATION: Based on the patient's presentation of Inability to take oral fluids the patient was given IV fluids. IV Hydration was used because oral hydration was not as rapid as required. Upon recheck following hydration, the patient was improved.      ADDITIONAL PROBLEM LIST  #Pancreatitis.  Patient with nausea vomiting and abdominal pain and elevated lipase consistent with this.  Potential for gallstone pancreatitis, she does not have her gallbladder but does have a transaminitis and elevated bilirubin as well.  Symptoms have been controlled with antiemetics, she is started on IV fluids, plan for hospitalization for further evaluation and likely MRCP    #Transaminitis, as above      DISPOSITION AND DISCUSSIONS  I have discussed management of the patient with the following physicians and EDUARDO's:  Dr Akins for admission    Patient is admitted in stable condition    FINAL DIAGNOSIS  1. Acute pancreatitis, unspecified complication status, unspecified pancreatitis type    2. Transaminitis    3. Elevated bilirubin           Electronically signed by: Shelton Tenorio M.D., 4/26/2023 5:03 PM

## 2023-04-27 NOTE — H&P
Hospital Medicine History & Physical Note    Date of Service  4/26/2023    Primary Care Physician  DEEPA Elise    Consultants  none    Specialist Names: none    Code Status  Full Code    Chief Complaint  Chief Complaint   Patient presents with    Abdominal Pain     Pt bib remsa c/o mid abdominal pain x 2 days. Describes pain as cramping. Also reported n/v/d all day. Unable to tolerate po. Rates pain as 7/10. NAD.       History of Presenting Illness  Merari Mcghee is a 28 y.o. female past medical history of cholecystectomy who presented 4/26/2023 with intense abdominal pain that started 2 PM today with associated nausea and vomiting.  Describes the pain as sharp, radiating to back, not relieved with anything and unable to tolerate liquids or diet.  Denies any fevers or chills.  Denies any trauma.  Is not on any blood thinners.  She does report having similar episodes in the past. Denies any urinary frequency, discharge or urinary discomfort. In the ER, she was noted to have elevated liver function test and elevated lipase level.  Right upper quadrant sonogram showing mildly prominent CBD.  She will be hospitalized for acute pancreatitis for aggressive IV hydration MRCP given elevated LFTs.    I discussed the plan of care with patient.    Review of Systems  Review of Systems   Constitutional:  Negative for chills and fever.   HENT:  Negative for hearing loss and tinnitus.    Eyes:  Negative for blurred vision and double vision.   Respiratory:  Negative for cough and hemoptysis.    Cardiovascular:  Negative for chest pain and palpitations.   Gastrointestinal:  Positive for abdominal pain, nausea and vomiting. Negative for heartburn.   Genitourinary:  Negative for dysuria and urgency.   Musculoskeletal:  Negative for myalgias and neck pain.   Skin:  Negative for rash.   Neurological:  Negative for dizziness and headaches.   Endo/Heme/Allergies:  Does not bruise/bleed easily.    Psychiatric/Behavioral:  Negative for depression and suicidal ideas.      Past Medical History  No significant pmhx    Surgical History   has a past surgical history that includes beatriz by laparoscopy (N/A, 2017); other; pr  delivery only (N/A, 2019); and primary c section (2019).     Family History  family history includes No Known Problems in her father, mother, sister, and sister; Other (age of onset: 2) in her sister; Other (age of onset: 8) in her brother.   Family history reviewed with patient. There is no family history that is pertinent to the chief complaint.     Social History   reports that she has never smoked. She has never used smokeless tobacco. She reports that she does not drink alcohol and does not use drugs.    Allergies  No Known Allergies    Medications  None       Physical Exam  Temp:  [37.1 °C (98.7 °F)-37.3 °C (99.2 °F)] 37.1 °C (98.7 °F)  Pulse:  [60-73] 72  Resp:  [16-18] 18  BP: ()/(48-68) 104/65  SpO2:  [96 %-99 %] 97 %  Blood Pressure: 104/65   Temperature: 37.1 °C (98.7 °F)   Pulse: 72   Respiration: 18   Pulse Oximetry: 97 %       Physical Exam  Vitals and nursing note reviewed.   Constitutional:       Appearance: Normal appearance.   HENT:      Head: Normocephalic and atraumatic.      Right Ear: Tympanic membrane normal.      Left Ear: Tympanic membrane normal.      Nose: Nose normal.      Mouth/Throat:      Mouth: Mucous membranes are dry.      Pharynx: Oropharynx is clear.   Eyes:      Extraocular Movements: Extraocular movements intact.      Pupils: Pupils are equal, round, and reactive to light.   Cardiovascular:      Rate and Rhythm: Normal rate and regular rhythm.      Pulses: Normal pulses.      Heart sounds: Normal heart sounds.   Pulmonary:      Effort: Pulmonary effort is normal.      Breath sounds: Normal breath sounds.   Abdominal:      General: Bowel sounds are normal. There is no distension.      Palpations: Abdomen is soft. There is no  mass.      Tenderness: There is abdominal tenderness.   Musculoskeletal:         General: Normal range of motion.      Cervical back: Neck supple.   Skin:     General: Skin is warm.      Capillary Refill: Capillary refill takes 2 to 3 seconds.      Coloration: Skin is not jaundiced or pale.   Neurological:      General: No focal deficit present.      Mental Status: She is alert and oriented to person, place, and time. Mental status is at baseline.   Psychiatric:         Mood and Affect: Mood normal.         Behavior: Behavior normal.       Laboratory:  Recent Labs     04/26/23  1711   WBC 9.0   RBC 5.19   HEMOGLOBIN 13.5   HEMATOCRIT 41.5   MCV 80.0*   MCH 26.0*   MCHC 32.5*   RDW 42.4   PLATELETCT 279   MPV 9.3     Recent Labs     04/26/23  1711   SODIUM 140   POTASSIUM 3.7   CHLORIDE 107   CO2 22   GLUCOSE 107*   BUN 11   CREATININE 0.52   CALCIUM 8.7     Recent Labs     04/26/23  1711   ALTSGPT 280*   ASTSGOT 431*   ALKPHOSPHAT 142*   TBILIRUBIN 2.7*   LIPASE 351*   GLUCOSE 107*         No results for input(s): NTPROBNP in the last 72 hours.      No results for input(s): TROPONINT in the last 72 hours.    Imaging:  US-RUQ   Final Result      1. Mildly prominent CBD could relate to post cholecystectomy status.   2. Homogeneous liver with no discrete mass.         [id number]    (Results Pending)       no X-Ray or EKG requiring interpretation    Assessment/Plan:  Justification for Admission Status  I anticipate this patient will require at least two midnights for appropriate medical management, necessitating inpatient admission because acute pancreatitis, transaminitis, intractable nausea and vomiting.    Patient will need a Med/Surg bed on MEDICAL service .  The need is secondary to see above.    * Pancreatitis, unspecified pancreatitis type- (present on admission)  Assessment & Plan  Serial abdominal exams  Aggressive IV hydration  N.p.o.  MRCP in a.m.  Analgesics     Transaminitis  Assessment & Plan  MRCP    Check hepatitis panel   IV Fluids  Avoid hepatotoxic agents      Intractable nausea and vomiting  Assessment & Plan  IV Fluids  IV anti emetics         VTE prophylaxis: SCDs/TEDs

## 2023-04-27 NOTE — PROGRESS NOTES
Patient received from ER following initial treatment for abdominal pain and diagnosis of acute pancreatitis  vital signs stable upon admission to the unit denies chest pain or discomfort at this time.

## 2023-04-27 NOTE — PROGRESS NOTES
4 Eyes Skin Assessment Completed by Lily RN and JEANNETTE Lamb.    Head WDL  Ears WDL  Nose WDL  Mouth WDL  Neck WDL  Breast/Chest WDL  Shoulder Blades WDL  Spine WDL  (R) Arm/Elbow/Hand WDL  (L) Arm/Elbow/Hand WDL  Abdomen WDL  Groin WDL  Scrotum/Coccyx/Buttocks WDL  (R) Leg WDL  (L) Leg WDL  (R) Heel/Foot/Toe WDL  (L) Heel/Foot/Toe WDL          Devices In Places N/A      Interventions In Place N/A    Possible Skin Injury No    Pictures Uploaded Into Epic No, needs to be completed  Wound Consult Placed N/A  RN Wound Prevention Protocol Ordered No

## 2023-04-27 NOTE — PROGRESS NOTES
Received patient from ED following initial treatment of  abdominal pain nausea and vomiting with diagnosis of  unspecified pancreatitis  pt care assumed. VSS  pt admission assessment completed. Pt A&Ox4 Malay speaking however able to make basic needs known in english. Denies pain at this time. POC discussed with pt and verbalizes no questions. Pt denies any additional needs at this time. Bed locked and in lowest position, bed alarm off  Pt educated on fall risk and verbalized understanding, call light within reach Commenced on iv hydration and NPO from midnight with sips of water with medications.

## 2023-04-28 PROBLEM — E83.42 HYPOMAGNESEMIA: Status: ACTIVE | Noted: 2023-04-28

## 2023-04-28 LAB
ALBUMIN SERPL BCP-MCNC: 3.2 G/DL (ref 3.2–4.9)
ALBUMIN/GLOB SERPL: 1.3 G/DL
ALP SERPL-CCNC: 150 U/L (ref 30–99)
ALT SERPL-CCNC: 541 U/L (ref 2–50)
ANION GAP SERPL CALC-SCNC: 11 MMOL/L (ref 7–16)
AST SERPL-CCNC: 233 U/L (ref 12–45)
BASOPHILS # BLD AUTO: 0.5 % (ref 0–1.8)
BASOPHILS # BLD: 0.02 K/UL (ref 0–0.12)
BILIRUB SERPL-MCNC: 0.9 MG/DL (ref 0.1–1.5)
BUN SERPL-MCNC: 7 MG/DL (ref 8–22)
CALCIUM ALBUM COR SERPL-MCNC: 8.4 MG/DL (ref 8.5–10.5)
CALCIUM SERPL-MCNC: 7.8 MG/DL (ref 8.5–10.5)
CHLORIDE SERPL-SCNC: 110 MMOL/L (ref 96–112)
CHOLEST SERPL-MCNC: 101 MG/DL (ref 100–199)
CO2 SERPL-SCNC: 19 MMOL/L (ref 20–33)
CREAT SERPL-MCNC: 0.59 MG/DL (ref 0.5–1.4)
EOSINOPHIL # BLD AUTO: 0.22 K/UL (ref 0–0.51)
EOSINOPHIL NFR BLD: 5.8 % (ref 0–6.9)
ERYTHROCYTE [DISTWIDTH] IN BLOOD BY AUTOMATED COUNT: 43.3 FL (ref 35.9–50)
ERYTHROCYTE [SEDIMENTATION RATE] IN BLOOD BY WESTERGREN METHOD: 11 MM/HOUR (ref 0–25)
GFR SERPLBLD CREATININE-BSD FMLA CKD-EPI: 125 ML/MIN/1.73 M 2
GLOBULIN SER CALC-MCNC: 2.5 G/DL (ref 1.9–3.5)
GLUCOSE SERPL-MCNC: 77 MG/DL (ref 65–99)
HCT VFR BLD AUTO: 32.8 % (ref 37–47)
HDLC SERPL-MCNC: 44 MG/DL
HGB BLD-MCNC: 10.5 G/DL (ref 12–16)
IMM GRANULOCYTES # BLD AUTO: 0.02 K/UL (ref 0–0.11)
IMM GRANULOCYTES NFR BLD AUTO: 0.5 % (ref 0–0.9)
INR PPP: 1.11 (ref 0.87–1.13)
IRON SATN MFR SERPL: 9 % (ref 15–55)
IRON SERPL-MCNC: 27 UG/DL (ref 40–170)
LDLC SERPL CALC-MCNC: 46 MG/DL
LIPASE SERPL-CCNC: 16 U/L (ref 11–82)
LYMPHOCYTES # BLD AUTO: 1.55 K/UL (ref 1–4.8)
LYMPHOCYTES NFR BLD: 40.6 % (ref 22–41)
MAGNESIUM SERPL-MCNC: 1.8 MG/DL (ref 1.5–2.5)
MCH RBC QN AUTO: 25.5 PG (ref 27–33)
MCHC RBC AUTO-ENTMCNC: 32 G/DL (ref 33.6–35)
MCV RBC AUTO: 79.6 FL (ref 81.4–97.8)
MONOCYTES # BLD AUTO: 0.37 K/UL (ref 0–0.85)
MONOCYTES NFR BLD AUTO: 9.7 % (ref 0–13.4)
NEUTROPHILS # BLD AUTO: 1.64 K/UL (ref 2–7.15)
NEUTROPHILS NFR BLD: 42.9 % (ref 44–72)
NRBC # BLD AUTO: 0 K/UL
NRBC BLD-RTO: 0 /100 WBC
PHOSPHATE SERPL-MCNC: 2.5 MG/DL (ref 2.5–4.5)
PLATELET # BLD AUTO: 192 K/UL (ref 164–446)
PMV BLD AUTO: 9.9 FL (ref 9–12.9)
POTASSIUM SERPL-SCNC: 3.5 MMOL/L (ref 3.6–5.5)
PROT SERPL-MCNC: 5.7 G/DL (ref 6–8.2)
PROTHROMBIN TIME: 14.2 SEC (ref 12–14.6)
RBC # BLD AUTO: 4.12 M/UL (ref 4.2–5.4)
SODIUM SERPL-SCNC: 140 MMOL/L (ref 135–145)
TIBC SERPL-MCNC: 295 UG/DL (ref 250–450)
TRIGL SERPL-MCNC: 53 MG/DL (ref 0–149)
TSH SERPL DL<=0.005 MIU/L-ACNC: 1.17 UIU/ML (ref 0.38–5.33)
UIBC SERPL-MCNC: 268 UG/DL (ref 110–370)
VIT B12 SERPL-MCNC: >2000 PG/ML (ref 211–911)
WBC # BLD AUTO: 3.8 K/UL (ref 4.8–10.8)

## 2023-04-28 PROCEDURE — 85610 PROTHROMBIN TIME: CPT

## 2023-04-28 PROCEDURE — 80053 COMPREHEN METABOLIC PANEL: CPT

## 2023-04-28 PROCEDURE — 700105 HCHG RX REV CODE 258: Performed by: STUDENT IN AN ORGANIZED HEALTH CARE EDUCATION/TRAINING PROGRAM

## 2023-04-28 PROCEDURE — A9270 NON-COVERED ITEM OR SERVICE: HCPCS | Performed by: FAMILY MEDICINE

## 2023-04-28 PROCEDURE — 83690 ASSAY OF LIPASE: CPT

## 2023-04-28 PROCEDURE — 82607 VITAMIN B-12: CPT

## 2023-04-28 PROCEDURE — 99232 SBSQ HOSP IP/OBS MODERATE 35: CPT | Performed by: FAMILY MEDICINE

## 2023-04-28 PROCEDURE — 84100 ASSAY OF PHOSPHORUS: CPT

## 2023-04-28 PROCEDURE — 80061 LIPID PANEL: CPT

## 2023-04-28 PROCEDURE — 770006 HCHG ROOM/CARE - MED/SURG/GYN SEMI*

## 2023-04-28 PROCEDURE — 85025 COMPLETE CBC W/AUTO DIFF WBC: CPT

## 2023-04-28 PROCEDURE — 84443 ASSAY THYROID STIM HORMONE: CPT

## 2023-04-28 PROCEDURE — 700102 HCHG RX REV CODE 250 W/ 637 OVERRIDE(OP): Performed by: FAMILY MEDICINE

## 2023-04-28 PROCEDURE — 83550 IRON BINDING TEST: CPT

## 2023-04-28 PROCEDURE — 700111 HCHG RX REV CODE 636 W/ 250 OVERRIDE (IP): Performed by: FAMILY MEDICINE

## 2023-04-28 PROCEDURE — 83735 ASSAY OF MAGNESIUM: CPT

## 2023-04-28 PROCEDURE — 83540 ASSAY OF IRON: CPT

## 2023-04-28 RX ORDER — MAGNESIUM SULFATE HEPTAHYDRATE 40 MG/ML
2 INJECTION, SOLUTION INTRAVENOUS ONCE
Status: COMPLETED | OUTPATIENT
Start: 2023-04-28 | End: 2023-04-28

## 2023-04-28 RX ADMIN — MAGNESIUM SULFATE HEPTAHYDRATE 2 G: 40 INJECTION, SOLUTION INTRAVENOUS at 08:15

## 2023-04-28 RX ADMIN — SODIUM CHLORIDE: 9 INJECTION, SOLUTION INTRAVENOUS at 23:57

## 2023-04-28 RX ADMIN — CEFTRIAXONE SODIUM 1000 MG: 10 INJECTION, POWDER, FOR SOLUTION INTRAVENOUS at 05:54

## 2023-04-28 RX ADMIN — SODIUM CHLORIDE: 9 INJECTION, SOLUTION INTRAVENOUS at 17:02

## 2023-04-28 RX ADMIN — SODIUM CHLORIDE: 9 INJECTION, SOLUTION INTRAVENOUS at 05:55

## 2023-04-28 RX ADMIN — POTASSIUM CHLORIDE 20 MEQ: 1500 TABLET, EXTENDED RELEASE ORAL at 05:53

## 2023-04-28 ASSESSMENT — ENCOUNTER SYMPTOMS
HEARTBURN: 0
WEAKNESS: 1
MYALGIAS: 0
SORE THROAT: 0
BACK PAIN: 0
SHORTNESS OF BREATH: 0
ABDOMINAL PAIN: 1
NERVOUS/ANXIOUS: 0
FEVER: 0
DIARRHEA: 0
DIZZINESS: 0
FOCAL WEAKNESS: 0
NAUSEA: 0
FLANK PAIN: 0
BLURRED VISION: 0
COUGH: 0
DIAPHORESIS: 0
NECK PAIN: 0
WHEEZING: 0
SENSORY CHANGE: 0
SPEECH CHANGE: 0
CHILLS: 0
HEADACHES: 0
VOMITING: 0
PALPITATIONS: 0

## 2023-04-28 ASSESSMENT — PAIN DESCRIPTION - PAIN TYPE
TYPE: ACUTE PAIN
TYPE: ACUTE PAIN

## 2023-04-28 NOTE — PROGRESS NOTES
Hospital Medicine Daily Progress Note    Date of Service  4/28/2023    Chief Complaint  Merari Mcghee is a 28 y.o. female admitted 4/26/2023 with pancreatitis    Hospital Course  Admitted with possible pancreatitis, elevated LFTs.  History of cholecystectomy.  Patient was started on IVF hydration.    Interval Problem Update  Pancreatitis - lipase trended down  Elev LFTs - MRCP negative  UTI - culture pending  Low potassium and magnesium    A qualified  was used to interpret   during this encounter.  ’s name/ID number was [id number]  and mode of interpretation was iPAD    I have discussed this patient's plan of care and discharge plan at IDT rounds today with Case Management, Nursing, Nursing leadership, and other members of the IDT team.    Consultants/Specialty  None    Code Status  Full Code    Disposition  Patient is not medically cleared for discharge.   Anticipate discharge to to home with close outpatient follow-up.  I have placed the appropriate orders for post-discharge needs.    Review of Systems  Review of Systems   Constitutional:  Positive for malaise/fatigue. Negative for chills, diaphoresis and fever.   HENT:  Negative for congestion, hearing loss and sore throat.    Eyes:  Negative for blurred vision.   Respiratory:  Negative for cough, shortness of breath and wheezing.    Cardiovascular:  Negative for chest pain, palpitations and leg swelling.   Gastrointestinal:  Positive for abdominal pain. Negative for diarrhea, heartburn, nausea and vomiting.   Genitourinary:  Negative for dysuria, flank pain and hematuria.   Musculoskeletal:  Negative for back pain, joint pain, myalgias and neck pain.   Skin:  Negative for rash.   Neurological:  Positive for weakness. Negative for dizziness, sensory change, speech change, focal weakness and headaches.   Psychiatric/Behavioral:  The patient is not nervous/anxious.       Physical Exam  Temp:  [36.6 °C (97.8 °F)-36.8 °C (98.2 °F)]  36.6 °C (97.8 °F)  Pulse:  [63-78] 63  Resp:  [18] 18  BP: ()/(53-83) 115/83  SpO2:  [96 %-99 %] 98 %    Physical Exam  Vitals and nursing note reviewed.   HENT:      Head: Normocephalic and atraumatic.      Nose: No congestion.      Mouth/Throat:      Mouth: Mucous membranes are moist.   Eyes:      Extraocular Movements: Extraocular movements intact.      Conjunctiva/sclera: Conjunctivae normal.   Cardiovascular:      Rate and Rhythm: Normal rate and regular rhythm.   Pulmonary:      Effort: Pulmonary effort is normal.      Breath sounds: Normal breath sounds.   Abdominal:      General: There is no distension.      Tenderness: There is abdominal tenderness. There is no right CVA tenderness, left CVA tenderness, guarding or rebound.   Musculoskeletal:      Cervical back: No tenderness.      Right lower leg: No edema.      Left lower leg: No edema.   Skin:     General: Skin is warm and dry.   Neurological:      General: No focal deficit present.      Mental Status: She is alert and oriented to person, place, and time.      Cranial Nerves: No cranial nerve deficit.       Fluids    Intake/Output Summary (Last 24 hours) at 4/28/2023 1451  Last data filed at 4/28/2023 1400  Gross per 24 hour   Intake 1620 ml   Output --   Net 1620 ml         Laboratory  Recent Labs     04/26/23  1711 04/27/23  0718 04/28/23  0321   WBC 9.0 3.3* 3.8*   RBC 5.19 4.09* 4.12*   HEMOGLOBIN 13.5 10.7* 10.5*   HEMATOCRIT 41.5 31.9* 32.8*   MCV 80.0* 78.0* 79.6*   MCH 26.0* 26.2* 25.5*   MCHC 32.5* 33.5* 32.0*   RDW 42.4 41.1 43.3   PLATELETCT 279 182 192   MPV 9.3 9.4 9.9       Recent Labs     04/26/23  1711 04/27/23  0719 04/28/23  0321   SODIUM 140 142 140   POTASSIUM 3.7 3.5* 3.5*   CHLORIDE 107 111 110   CO2 22 23 19*   GLUCOSE 107* 98 77   BUN 11 8 7*   CREATININE 0.52 0.63 0.59   CALCIUM 8.7 7.9* 7.8*       Recent Labs     04/27/23  0718 04/28/23  0321   INR 1.20* 1.11           Recent Labs     04/28/23  0321   TRIGLYCERIDE 53   HDL  44   LDL 46       Imaging  [id number]   Final Result      1.  Surgically absent gallbladder without biliary dilatation. No choledocholithiasis.   2.  No pancreatitis.      US-RUQ   Final Result      1. Mildly prominent CBD could relate to post cholecystectomy status.   2. Homogeneous liver with no discrete mass.                Assessment/Plan  * Pancreatitis, unspecified pancreatitis type- (present on admission)  Assessment & Plan  IVF hydration  Start Clear liquids, advance diet as tolerated to GI soft, Low fat    UTI (urinary tract infection)- (present on admission)  Assessment & Plan  IV Rocephin  Follow culture    Transaminitis- (present on admission)  Assessment & Plan  IVF hydration  Follow cmp      Intractable nausea and vomiting- (present on admission)  Assessment & Plan  IVF hydration    Hypomagnesemia- (present on admission)  Assessment & Plan  IV Mg 2 g  Follow level    Hypokalemia- (present on admission)  Assessment & Plan  Kdur  Follow cmp    Anemia- (present on admission)  Assessment & Plan  Follow cbc    Leukopenia- (present on admission)  Assessment & Plan  Follow cbc         VTE prophylaxis: SCDs/TEDs    I have performed a physical exam and reviewed and updated ROS and Plan today (4/28/2023). In review of yesterday's note (4/27/2023), there are no changes except as documented above.

## 2023-04-29 VITALS
TEMPERATURE: 97.8 F | SYSTOLIC BLOOD PRESSURE: 112 MMHG | HEIGHT: 66 IN | HEART RATE: 80 BPM | WEIGHT: 184.3 LBS | DIASTOLIC BLOOD PRESSURE: 68 MMHG | BODY MASS INDEX: 29.62 KG/M2 | OXYGEN SATURATION: 97 % | RESPIRATION RATE: 18 BRPM

## 2023-04-29 LAB
ALBUMIN SERPL BCP-MCNC: 3.4 G/DL (ref 3.2–4.9)
ALBUMIN/GLOB SERPL: 1.3 G/DL
ALP SERPL-CCNC: 145 U/L (ref 30–99)
ALT SERPL-CCNC: 373 U/L (ref 2–50)
ANION GAP SERPL CALC-SCNC: 10 MMOL/L (ref 7–16)
AST SERPL-CCNC: 96 U/L (ref 12–45)
BASOPHILS # BLD AUTO: 0.7 % (ref 0–1.8)
BASOPHILS # BLD: 0.03 K/UL (ref 0–0.12)
BILIRUB SERPL-MCNC: 0.7 MG/DL (ref 0.1–1.5)
BUN SERPL-MCNC: 5 MG/DL (ref 8–22)
CALCIUM ALBUM COR SERPL-MCNC: 8.3 MG/DL (ref 8.5–10.5)
CALCIUM SERPL-MCNC: 7.8 MG/DL (ref 8.5–10.5)
CHLORIDE SERPL-SCNC: 111 MMOL/L (ref 96–112)
CO2 SERPL-SCNC: 21 MMOL/L (ref 20–33)
CREAT SERPL-MCNC: 0.58 MG/DL (ref 0.5–1.4)
EOSINOPHIL # BLD AUTO: 0.23 K/UL (ref 0–0.51)
EOSINOPHIL NFR BLD: 5.5 % (ref 0–6.9)
ERYTHROCYTE [DISTWIDTH] IN BLOOD BY AUTOMATED COUNT: 41.1 FL (ref 35.9–50)
GFR SERPLBLD CREATININE-BSD FMLA CKD-EPI: 126 ML/MIN/1.73 M 2
GLOBULIN SER CALC-MCNC: 2.7 G/DL (ref 1.9–3.5)
GLUCOSE SERPL-MCNC: 85 MG/DL (ref 65–99)
HCT VFR BLD AUTO: 32.3 % (ref 37–47)
HGB BLD-MCNC: 10.6 G/DL (ref 12–16)
IMM GRANULOCYTES # BLD AUTO: 0.01 K/UL (ref 0–0.11)
IMM GRANULOCYTES NFR BLD AUTO: 0.2 % (ref 0–0.9)
LIPASE SERPL-CCNC: 20 U/L (ref 11–82)
LYMPHOCYTES # BLD AUTO: 1.89 K/UL (ref 1–4.8)
LYMPHOCYTES NFR BLD: 45 % (ref 22–41)
MAGNESIUM SERPL-MCNC: 2.2 MG/DL (ref 1.5–2.5)
MCH RBC QN AUTO: 25.5 PG (ref 27–33)
MCHC RBC AUTO-ENTMCNC: 32.8 G/DL (ref 33.6–35)
MCV RBC AUTO: 77.8 FL (ref 81.4–97.8)
MONOCYTES # BLD AUTO: 0.42 K/UL (ref 0–0.85)
MONOCYTES NFR BLD AUTO: 10 % (ref 0–13.4)
NEUTROPHILS # BLD AUTO: 1.62 K/UL (ref 2–7.15)
NEUTROPHILS NFR BLD: 38.6 % (ref 44–72)
NRBC # BLD AUTO: 0 K/UL
NRBC BLD-RTO: 0 /100 WBC
PLATELET # BLD AUTO: 204 K/UL (ref 164–446)
PMV BLD AUTO: 9.9 FL (ref 9–12.9)
POTASSIUM SERPL-SCNC: 3.6 MMOL/L (ref 3.6–5.5)
PROT SERPL-MCNC: 6.1 G/DL (ref 6–8.2)
RBC # BLD AUTO: 4.15 M/UL (ref 4.2–5.4)
SODIUM SERPL-SCNC: 142 MMOL/L (ref 135–145)
WBC # BLD AUTO: 4.2 K/UL (ref 4.8–10.8)

## 2023-04-29 PROCEDURE — 83735 ASSAY OF MAGNESIUM: CPT

## 2023-04-29 PROCEDURE — 99239 HOSP IP/OBS DSCHRG MGMT >30: CPT | Performed by: FAMILY MEDICINE

## 2023-04-29 PROCEDURE — 700102 HCHG RX REV CODE 250 W/ 637 OVERRIDE(OP): Performed by: FAMILY MEDICINE

## 2023-04-29 PROCEDURE — 83690 ASSAY OF LIPASE: CPT

## 2023-04-29 PROCEDURE — 700105 HCHG RX REV CODE 258: Performed by: STUDENT IN AN ORGANIZED HEALTH CARE EDUCATION/TRAINING PROGRAM

## 2023-04-29 PROCEDURE — 80053 COMPREHEN METABOLIC PANEL: CPT

## 2023-04-29 PROCEDURE — 700111 HCHG RX REV CODE 636 W/ 250 OVERRIDE (IP): Performed by: FAMILY MEDICINE

## 2023-04-29 PROCEDURE — 85025 COMPLETE CBC W/AUTO DIFF WBC: CPT

## 2023-04-29 PROCEDURE — A9270 NON-COVERED ITEM OR SERVICE: HCPCS | Performed by: FAMILY MEDICINE

## 2023-04-29 RX ADMIN — CEFTRIAXONE SODIUM 1000 MG: 10 INJECTION, POWDER, FOR SOLUTION INTRAVENOUS at 05:37

## 2023-04-29 RX ADMIN — POTASSIUM CHLORIDE 20 MEQ: 1500 TABLET, EXTENDED RELEASE ORAL at 05:36

## 2023-04-29 RX ADMIN — SODIUM CHLORIDE: 9 INJECTION, SOLUTION INTRAVENOUS at 06:50

## 2023-04-29 NOTE — DISCHARGE SUMMARY
Discharge Summary    CHIEF COMPLAINT ON ADMISSION  Chief Complaint   Patient presents with    Abdominal Pain     Pt bib remsa c/o mid abdominal pain x 2 days. Describes pain as cramping. Also reported n/v/d all day. Unable to tolerate po. Rates pain as 7/10. NAD.       Reason for Admission  EMS     Admission Date  4/26/2023    CODE STATUS  Full Code    HPI & HOSPITAL COURSE  This is a 28 y.o. female here with   possible pancreatitis, elevated LFTs.  History of cholecystectomy.  Patient was started on IVF hydration.  Her lipase and LFTs did trend down.  MRCP showed no significant findings.  Patient appeared to have probable urinary tract infection, she was given IV Rocephin and completed course.  Electrolyte deficiencies were replaced.  Patient was started on clear liquids, advance to full liquid as she was able to tolerate.  Advised to follow-up with PCP for repeat labs for LFTs, and may need continued work-up if they continue to remain elevated.    A qualified  was used to interpret   during this encounter.  ’s name/ID number was [id number] and mode of interpretation was iPAD.     Therefore, she is discharged in good and stable condition to home with close outpatient follow-up.    The patient met 2-midnight criteria for an inpatient stay at the time of discharge.    Discharge Date  4/29/2023    FOLLOW UP ITEMS POST DISCHARGE  Follow-up as below    DISCHARGE DIAGNOSES  Principal Problem:    Pancreatitis, unspecified pancreatitis type POA: Yes  Active Problems:    Intractable nausea and vomiting POA: Yes    Transaminitis POA: Yes    UTI (urinary tract infection) POA: Yes    Leukopenia POA: Yes    Anemia POA: Yes    Hypokalemia POA: Yes    Hypomagnesemia POA: Yes  Resolved Problems:    * No resolved hospital problems. *      FOLLOW UP  No future appointments.  Suzy Storm, A.P.R.N.  [address] [address]  [phone]    Follow up        MEDICATIONS ON DISCHARGE      Medication List      You have not been prescribed any medications.         Allergies  No Known Allergies    DIET  Orders Placed This Encounter   Procedures    Diet Order Diet: Full Liquid     Standing Status:   Standing     Number of Occurrences:   1     Order Specific Question:   Diet:     Answer:   Full Liquid [11]       ACTIVITY  As tolerated.  Weight bearing as tolerated    CONSULTATIONS  None    PROCEDURES  None    LABORATORY  Lab Results   Component Value Date    SODIUM 142 04/29/2023    POTASSIUM 3.6 04/29/2023    CHLORIDE 111 04/29/2023    CO2 21 04/29/2023    GLUCOSE 85 04/29/2023    BUN 5 (L) 04/29/2023    CREATININE 0.58 04/29/2023        Lab Results   Component Value Date    WBC 4.2 (L) 04/29/2023    HEMOGLOBIN 10.6 (L) 04/29/2023    HEMATOCRIT 32.3 (L) 04/29/2023    PLATELETCT 204 04/29/2023        Total time of the discharge process exceeds 35 minutes.

## 2023-04-29 NOTE — PROGRESS NOTES
Report received. Assumed care. Serbian speaking but know little Yoruba. Pt in bed sitting up. A/O x4. VSS. Responds appropriately. Denies pain, SOB. Assessment complete. Discussed POC, , pt verbalizes understanding. Explained importance of calling before getting OOB. Call light and belongings within reach. Bed alarm on. Bed in the lowest position. Treaded socks in place. Hourly rounding in progress. Will continue to monitor .

## 2023-04-29 NOTE — PROGRESS NOTES
Patient is being discharged home. Patient is A&Ox4. IV removed. Discharge instructions provided to patient and reviewed. Patient verbalized understanding and all questions and concerns were addressed.

## 2023-05-09 ENCOUNTER — OFFICE VISIT (OUTPATIENT)
Dept: MEDICAL GROUP | Facility: MEDICAL CENTER | Age: 28
End: 2023-05-09
Attending: NURSE PRACTITIONER
Payer: COMMERCIAL

## 2023-05-09 ENCOUNTER — TELEPHONE (OUTPATIENT)
Dept: MEDICAL GROUP | Facility: MEDICAL CENTER | Age: 28
End: 2023-05-09

## 2023-05-09 VITALS
HEIGHT: 66 IN | OXYGEN SATURATION: 97 % | DIASTOLIC BLOOD PRESSURE: 68 MMHG | TEMPERATURE: 98 F | RESPIRATION RATE: 18 BRPM | BODY MASS INDEX: 28.75 KG/M2 | HEART RATE: 98 BPM | WEIGHT: 178.9 LBS | SYSTOLIC BLOOD PRESSURE: 110 MMHG

## 2023-05-09 DIAGNOSIS — H61.22 IMPACTED CERUMEN OF LEFT EAR: ICD-10-CM

## 2023-05-09 DIAGNOSIS — R22.32 MASS OF LEFT AXILLA: ICD-10-CM

## 2023-05-09 DIAGNOSIS — D64.9 ANEMIA, UNSPECIFIED TYPE: ICD-10-CM

## 2023-05-09 DIAGNOSIS — R79.89 ELEVATED LFTS: ICD-10-CM

## 2023-05-09 PROCEDURE — 99213 OFFICE O/P EST LOW 20 MIN: CPT | Mod: 25 | Performed by: NURSE PRACTITIONER

## 2023-05-09 PROCEDURE — 99214 OFFICE O/P EST MOD 30 MIN: CPT | Performed by: NURSE PRACTITIONER

## 2023-05-09 PROCEDURE — 69209 REMOVE IMPACTED EAR WAX UNI: CPT | Performed by: NURSE PRACTITIONER

## 2023-05-09 ASSESSMENT — PATIENT HEALTH QUESTIONNAIRE - PHQ9: CLINICAL INTERPRETATION OF PHQ2 SCORE: 0

## 2023-05-09 ASSESSMENT — FIBROSIS 4 INDEX: FIB4 SCORE: 0.71

## 2023-05-09 NOTE — ASSESSMENT & PLAN NOTE
Acute-   Noted cerumen impaction to left ear  MA lavaged ear and issue resolved   Discussed with patient to avoid q-tips and use debrox solution or ear lavage device

## 2023-05-09 NOTE — PROGRESS NOTES
No chief complaint on file.      Subjective:     HPI:   Merari Mcghee is a 28 y.o. female here to discuss the evaluation and management of:      Problem   Impacted Cerumen of Left Ear    Patient with complaints of hearing loss and muffling in her ear. States it started the day she was discharged from hospital and has been very diminished with some buzzing in her ear. Was using Q-tips to clean her ears.      Mass of Left Axilla    Patient states for the last 5 months she has noticed a small lump in her left axilla. It is very tender on palpitation. It has not really changed size or changed in the time it has been there but has her concerned.      Elevated Lfts    Patient was recently hospitalized secondary to pancreatitis with elevated liver enzymes. She was discharged from hospital with directions to follow up with PCP for repeat labs.          ROS  See HPI     No Known Allergies    Current medicines (including changes today)  No current outpatient medications on file.     No current facility-administered medications for this visit.       Social History     Tobacco Use    Smoking status: Never    Smokeless tobacco: Never   Vaping Use    Vaping Use: Never used   Substance Use Topics    Alcohol use: No     Alcohol/week: 0.0 oz    Drug use: No       Patient Active Problem List    Diagnosis Date Noted    Encounter to establish care 2022    Impacted cerumen of left ear 2023    Mass of left axilla 2023    Elevated LFTs 2023    Hypomagnesemia 2023    Intractable nausea and vomiting 2023    Transaminitis 2023    Leukopenia 2023    Anemia 2023    Hypokalemia 2023    UTI (urinary tract infection) 2023    Pancreatitis, unspecified pancreatitis type 2023    Onychomycosis 2022    Postpartum depression 2020    Encounter for insertion of ParaGard IUD 2020    Postpartum care following  delivery 2020       Family History  "  Problem Relation Age of Onset    No Known Problems Mother     No Known Problems Father     No Known Problems Sister     No Known Problems Sister     Other Sister 2        unknown    Other Brother 8        car accident           Objective:     /68 (BP Location: Left arm, Patient Position: Sitting, BP Cuff Size: Adult)   Pulse 98   Temp 36.7 °C (98 °F) (Temporal)   Resp 18   Ht 1.676 m (5' 6\")   Wt 81.1 kg (178 lb 14.4 oz)   SpO2 97%  Body mass index is 28.88 kg/m².    Physical Exam:  Physical Exam  Vitals reviewed.   Constitutional:       General: She is awake.      Appearance: Normal appearance. She is well-developed.   HENT:      Head: Normocephalic.      Left Ear: There is impacted cerumen.      Nose: Nose normal.   Eyes:      Conjunctiva/sclera: Conjunctivae normal.   Cardiovascular:      Rate and Rhythm: Normal rate.   Pulmonary:      Effort: Pulmonary effort is normal. No respiratory distress.   Chest:       Musculoskeletal:      Cervical back: Neck supple.   Lymphadenopathy:      Upper Body:      Left upper body: Axillary adenopathy present.   Skin:     General: Skin is warm and dry.   Neurological:      Mental Status: She is alert and oriented to person, place, and time.   Psychiatric:         Mood and Affect: Mood normal.         Behavior: Behavior normal. Behavior is cooperative.            Assessment and Plan:     The following treatment plan was discussed:    Problem List Items Addressed This Visit       Anemia    Relevant Orders    CBC WITHOUT DIFFERENTIAL    IRON/TOTAL IRON BIND    VITAMIN B12    FOLATE    Impacted cerumen of left ear     Acute-   Noted cerumen impaction to left ear  MA lavaged ear and issue resolved   Discussed with patient to avoid q-tips and use debrox solution or ear lavage device          Mass of left axilla     Ongoing- acute-   Unclear diagnosis- US of left axilla ordered  Further interventions based on US findings         Relevant Orders    US-EXTREMITY NON " VASCULAR UNILATERAL LEFT    Elevated LFTs     Ongoing-   No acute needs or pain at this time  Will repeat labs          Relevant Orders    Comp Metabolic Panel    HEPATIC FUNCTION PANEL    HEP C VIRUS ANTIBODY       Any change or worsening of signs or symptoms, patient encouraged to follow-up or report to emergency room for further evaluation. Patient verbalizes understanding and agrees.    Follow-Up: Follow up after labs and imaging.       PLEASE NOTE: This dictation was created using voice recognition software. I have made every reasonable attempt to correct obvious errors, but I expect that there are errors of grammar and possibly content that I did not discover before finalizing the note.

## 2023-05-09 NOTE — ASSESSMENT & PLAN NOTE
Ongoing- acute-   Unclear diagnosis- US of left axilla ordered  Further interventions based on US findings

## 2023-05-09 NOTE — TELEPHONE ENCOUNTER
Patient Came in office today with PCP , I proceed to do a ear lavage supplies I used were a towel, earirgator catch basing , ear tips,peroxide and water.

## 2023-05-15 ENCOUNTER — HOSPITAL ENCOUNTER (OUTPATIENT)
Dept: LAB | Facility: MEDICAL CENTER | Age: 28
End: 2023-05-15
Attending: NURSE PRACTITIONER
Payer: COMMERCIAL

## 2023-05-15 DIAGNOSIS — R79.89 ELEVATED LFTS: ICD-10-CM

## 2023-05-15 DIAGNOSIS — D64.9 ANEMIA, UNSPECIFIED TYPE: ICD-10-CM

## 2023-05-15 LAB
ALBUMIN SERPL BCP-MCNC: 4.1 G/DL (ref 3.2–4.9)
ALBUMIN/GLOB SERPL: 1.3 G/DL
ALP SERPL-CCNC: 80 U/L (ref 30–99)
ALT SERPL-CCNC: 25 U/L (ref 2–50)
ANION GAP SERPL CALC-SCNC: 11 MMOL/L (ref 7–16)
AST SERPL-CCNC: 19 U/L (ref 12–45)
BILIRUB CONJ SERPL-MCNC: <0.2 MG/DL (ref 0.1–0.5)
BILIRUB INDIRECT SERPL-MCNC: NORMAL MG/DL (ref 0–1)
BILIRUB SERPL-MCNC: 1.1 MG/DL (ref 0.1–1.5)
BUN SERPL-MCNC: 14 MG/DL (ref 8–22)
CALCIUM ALBUM COR SERPL-MCNC: 8.7 MG/DL (ref 8.5–10.5)
CALCIUM SERPL-MCNC: 8.8 MG/DL (ref 8.5–10.5)
CHLORIDE SERPL-SCNC: 110 MMOL/L (ref 96–112)
CO2 SERPL-SCNC: 21 MMOL/L (ref 20–33)
CREAT SERPL-MCNC: 0.63 MG/DL (ref 0.5–1.4)
ERYTHROCYTE [DISTWIDTH] IN BLOOD BY AUTOMATED COUNT: 42.9 FL (ref 35.9–50)
FOLATE SERPL-MCNC: 12.8 NG/ML
GFR SERPLBLD CREATININE-BSD FMLA CKD-EPI: 123 ML/MIN/1.73 M 2
GLOBULIN SER CALC-MCNC: 3.1 G/DL (ref 1.9–3.5)
GLUCOSE SERPL-MCNC: 89 MG/DL (ref 65–99)
HCT VFR BLD AUTO: 38.1 % (ref 37–47)
HCV AB SER QL: NORMAL
HGB BLD-MCNC: 12.3 G/DL (ref 12–16)
IRON SATN MFR SERPL: 7 % (ref 15–55)
IRON SERPL-MCNC: 26 UG/DL (ref 40–170)
MCH RBC QN AUTO: 25.6 PG (ref 27–33)
MCHC RBC AUTO-ENTMCNC: 32.3 G/DL (ref 33.6–35)
MCV RBC AUTO: 79.4 FL (ref 81.4–97.8)
PLATELET # BLD AUTO: 229 K/UL (ref 164–446)
PMV BLD AUTO: 10.6 FL (ref 9–12.9)
POTASSIUM SERPL-SCNC: 4.1 MMOL/L (ref 3.6–5.5)
PROT SERPL-MCNC: 7.2 G/DL (ref 6–8.2)
RBC # BLD AUTO: 4.8 M/UL (ref 4.2–5.4)
SODIUM SERPL-SCNC: 142 MMOL/L (ref 135–145)
TIBC SERPL-MCNC: 358 UG/DL (ref 250–450)
UIBC SERPL-MCNC: 332 UG/DL (ref 110–370)
VIT B12 SERPL-MCNC: 1236 PG/ML (ref 211–911)
WBC # BLD AUTO: 5.4 K/UL (ref 4.8–10.8)

## 2023-05-15 PROCEDURE — 85027 COMPLETE CBC AUTOMATED: CPT

## 2023-05-15 PROCEDURE — 82248 BILIRUBIN DIRECT: CPT

## 2023-05-15 PROCEDURE — 83540 ASSAY OF IRON: CPT

## 2023-05-15 PROCEDURE — 83550 IRON BINDING TEST: CPT

## 2023-05-15 PROCEDURE — 36415 COLL VENOUS BLD VENIPUNCTURE: CPT

## 2023-05-15 PROCEDURE — 82607 VITAMIN B-12: CPT

## 2023-05-15 PROCEDURE — 82746 ASSAY OF FOLIC ACID SERUM: CPT

## 2023-05-15 PROCEDURE — 86803 HEPATITIS C AB TEST: CPT

## 2023-05-15 PROCEDURE — 80053 COMPREHEN METABOLIC PANEL: CPT

## 2023-08-15 NOTE — PROGRESS NOTES
Patient admitted to unit to room 321 from L&D. Received report from Cora MACHADO. Assumed care of patient. Assessment complete. Fundus is firm, at the umbilicus, with light lochia rubra. Denies any pain at this time, patient verbalized that she will call for medication on an as needed basis. Patient and FOB oriented to room and procedures, emergency light, call bell, infant I&O sheet, infant sleep safety, identification badges, and to call for assistance to bathroom. ID bands verified with L&D RN, cuddles on an blinking. Patient and FOB verbalized understanding, all questions addressed and answered. Bed is locked and in low position. Call light left within reach and encouraged to call for any needs if necessary.    English (3) adequate

## 2023-08-30 NOTE — CARE PLAN
The patient is Stable - Low risk of patient condition declining or worsening    Shift Goals  Clinical Goals: Abdominal pain management  Patient Goals: no pain  Family Goals: KRISTIN no family member present    Progress made toward(s) clinical / shift goals:  on going    Patient is progressing towards the following goals:  Problem: Fluid Volume  Goal: Fluid volume balance will be maintained  Outcome: Progressing     Problem: Fluid Volume  Goal: Fluid volume balance will be maintained  Outcome: Progressing     Problem: Nutrition  Goal: Patient's nutritional and fluid intake will be adequate or improve  Outcome: Progressing     Problem: Nutrition  Goal: Patient's nutritional and fluid intake will be adequate or improve  Outcome: Progressing     Problem: Knowledge Deficit - Standard  Goal: Patient and family/care givers will demonstrate understanding of plan of care, disease process/condition, diagnostic tests and medications  Outcome: Progressing         
The patient is Stable - Low risk of patient condition declining or worsening    Shift Goals  Clinical Goals: Monitor labs  Patient Goals: Rest  Family Goals: KRISTIN    Progress made toward(s) clinical / shift goals:  on going    Patient is  progressing towards the following goals:  Problem: Nutrition  Goal: Patient's nutritional and fluid intake will be adequate or improve  Outcome: Progressing     Problem: Knowledge Deficit - Standard  Goal: Patient and family/care givers will demonstrate understanding of plan of care, disease process/condition, diagnostic tests and medications  Outcome: Progressing     Problem: Pain - Standard  Goal: Alleviation of pain or a reduction in pain to the patient’s comfort goal  Outcome: Progressing         
The patient is Stable - Low risk of patient condition declining or worsening    Shift Goals  Clinical Goals: monitor labs  Patient Goals: rest  Family Goals: KRISTIN    Progress made toward(s) clinical / shift goals:      Patient alert and orient x 4 with the ability to make needs known.  Patient tolerated clear liquid and advanced to full liquid diet.  Patient ambulates with steady gait to bathroom.  Patient did not request pain medication during shift.  Medication provided per orders.  Patient provided the opportunity for questions during the shift.  No further questions.         Problem: Knowledge Deficit - Standard  Goal: Patient and family/care givers will demonstrate understanding of plan of care, disease process/condition, diagnostic tests and medications  Outcome: Progressing     Problem: Psychosocial  Goal: Patient's level of anxiety will decrease  Outcome: Progressing     Problem: Communication  Goal: The ability to communicate needs accurately and effectively will improve  Outcome: Progressing     Problem: Discharge Barriers/Planning  Goal: Patient's continuum of care needs are met  Outcome: Progressing     Problem: Fluid Volume  Goal: Fluid volume balance will be maintained  Outcome: Progressing     Problem: Nutrition  Goal: Patient's nutritional and fluid intake will be adequate or improve  Outcome: Progressing     Problem: Gastrointestinal Irritability  Goal: Nausea and vomiting will be absent or improve  Outcome: Progressing     Problem: Pain - Standard  Goal: Alleviation of pain or a reduction in pain to the patient’s comfort goal  Outcome: Progressing     
The patient is Stable - Low risk of patient condition declining or worsening    Shift Goals  Clinical Goals: monitor labs  Patient Goals: rest  Family Goals: KRISTIN no family member present    Progress made toward(s) clinical / shift goals:        Problem: Knowledge Deficit - Standard  Goal: Patient and family/care givers will demonstrate understanding of plan of care, disease process/condition, diagnostic tests and medications  Outcome: Progressing     Problem: Psychosocial  Goal: Patient's level of anxiety will decrease  Outcome: Progressing     Problem: Communication  Goal: The ability to communicate needs accurately and effectively will improve  Outcome: Progressing     Problem: Discharge Barriers/Planning  Goal: Patient's continuum of care needs are met  Outcome: Progressing     Problem: Fluid Volume  Goal: Fluid volume balance will be maintained  Outcome: Progressing     Problem: Nutrition  Goal: Patient's nutritional and fluid intake will be adequate or improve  Outcome: Progressing     Problem: Gastrointestinal Irritability  Goal: Nausea and vomiting will be absent or improve  Outcome: Progressing     
The patient is Stable - Low risk of patient condition declining or worsening    Shift Goals  Clinical Goals: monitor labs, monitor pain and rest  Patient Goals: rest and DC  Family Goals: N/A    Progress made toward(s) clinical / shift goals:  Patient Calcium is at 7.8 hemo. Is at 10.6 patient is stable.   Problem:Fluid Volume  Goal: Fluid volume balance will be maintained  Outcome: Progressing  Note: Continue with NS at 150   Patient is not progressing towards the following goals:  Problem: Nutrition  Goal: Patient's nutritional and fluid intake will be adequate or improve  Outcome: Not Met  Note: Patient has slight discomfort with full liquid diet will continue to monitor and state in report.      
The patient is Stable - Low risk of patient condition declining or worsening    Shift Goals  Clinical Goals: safety  Patient Goals: go home  Family Goals: N/A    Progress made toward(s) clinical / shift goals:      Patient discharged home this afternoon.  Patient alert and orient with ability to make needs known to staff.  Patient denies pain nor requested pain medication during this shift.  Patient ambulates to bathroom without difficulty nor assistive devices.  Medications administered per orders.  Patient provided opportunity for questions prior to discharge.  Patient to discharge lounge via wheelchair.  No questions at this time.     Problem: Knowledge Deficit - Standard  Goal: Patient and family/care givers will demonstrate understanding of plan of care, disease process/condition, diagnostic tests and medications  Outcome: Progressing     Problem: Psychosocial  Goal: Patient's level of anxiety will decrease  Outcome: Progressing     Problem: Communication  Goal: The ability to communicate needs accurately and effectively will improve  Outcome: Progressing     Problem: Discharge Barriers/Planning  Goal: Patient's continuum of care needs are met  Outcome: Progressing     Problem: Fluid Volume  Goal: Fluid volume balance will be maintained  Outcome: Progressing     Problem: Nutrition  Goal: Patient's nutritional and fluid intake will be adequate or improve  Outcome: Progressing     Problem: Gastrointestinal Irritability  Goal: Nausea and vomiting will be absent or improve  Outcome: Progressing     Problem: Pain - Standard  Goal: Alleviation of pain or a reduction in pain to the patient’s comfort goal  Outcome: Progressing         
78

## 2025-01-06 NOTE — ED NOTES
Medication given per MAR. Discharge teaching and paperwork provided regarding gastritis and abdominal pain and all questions/concerns answered. VSS, pain assessment stable. Given information regarding home care and reasons to follow up with ED or primary MD. Patient provided prilosec Rx. Patient discharged to the care of friend and ambulated out of the ED.   Patent

## 2025-01-20 ENCOUNTER — OFFICE VISIT (OUTPATIENT)
Dept: MEDICAL GROUP | Facility: MEDICAL CENTER | Age: 30
End: 2025-01-20
Attending: NURSE PRACTITIONER
Payer: COMMERCIAL

## 2025-01-20 ENCOUNTER — HOSPITAL ENCOUNTER (OUTPATIENT)
Dept: RADIOLOGY | Facility: MEDICAL CENTER | Age: 30
End: 2025-01-20
Attending: NURSE PRACTITIONER
Payer: COMMERCIAL

## 2025-01-20 ENCOUNTER — HOSPITAL ENCOUNTER (OUTPATIENT)
Dept: LAB | Facility: MEDICAL CENTER | Age: 30
End: 2025-01-20
Attending: NURSE PRACTITIONER
Payer: COMMERCIAL

## 2025-01-20 VITALS
SYSTOLIC BLOOD PRESSURE: 100 MMHG | HEIGHT: 66 IN | RESPIRATION RATE: 16 BRPM | DIASTOLIC BLOOD PRESSURE: 60 MMHG | BODY MASS INDEX: 29.12 KG/M2 | TEMPERATURE: 96.8 F | OXYGEN SATURATION: 88 % | WEIGHT: 181.2 LBS | HEART RATE: 88 BPM

## 2025-01-20 DIAGNOSIS — Z13.6 SCREENING FOR CARDIOVASCULAR CONDITION: ICD-10-CM

## 2025-01-20 DIAGNOSIS — D50.9 IRON DEFICIENCY ANEMIA, UNSPECIFIED IRON DEFICIENCY ANEMIA TYPE: ICD-10-CM

## 2025-01-20 DIAGNOSIS — R10.84 GENERALIZED ABDOMINAL PAIN: ICD-10-CM

## 2025-01-20 LAB
25(OH)D3 SERPL-MCNC: 14 NG/ML (ref 30–100)
ALBUMIN SERPL BCP-MCNC: 4.1 G/DL (ref 3.2–4.9)
ALBUMIN/GLOB SERPL: 1.3 G/DL
ALP SERPL-CCNC: 66 U/L (ref 30–99)
ALT SERPL-CCNC: 35 U/L (ref 2–50)
AMYLASE SERPL-CCNC: 37 U/L (ref 20–103)
ANION GAP SERPL CALC-SCNC: 11 MMOL/L (ref 7–16)
AST SERPL-CCNC: 28 U/L (ref 12–45)
BILIRUB SERPL-MCNC: 0.4 MG/DL (ref 0.1–1.5)
BUN SERPL-MCNC: 7 MG/DL (ref 8–22)
CALCIUM ALBUM COR SERPL-MCNC: 8.7 MG/DL (ref 8.5–10.5)
CALCIUM SERPL-MCNC: 8.8 MG/DL (ref 8.5–10.5)
CHLORIDE SERPL-SCNC: 104 MMOL/L (ref 96–112)
CHOLEST SERPL-MCNC: 106 MG/DL (ref 100–199)
CO2 SERPL-SCNC: 24 MMOL/L (ref 20–33)
CREAT SERPL-MCNC: 0.7 MG/DL (ref 0.5–1.4)
ERYTHROCYTE [DISTWIDTH] IN BLOOD BY AUTOMATED COUNT: 43.9 FL (ref 35.9–50)
EST. AVERAGE GLUCOSE BLD GHB EST-MCNC: 111 MG/DL
GFR SERPLBLD CREATININE-BSD FMLA CKD-EPI: 119 ML/MIN/1.73 M 2
GLOBULIN SER CALC-MCNC: 3.2 G/DL (ref 1.9–3.5)
GLUCOSE SERPL-MCNC: 94 MG/DL (ref 65–99)
HBA1C MFR BLD: 5.5 % (ref 4–5.6)
HCT VFR BLD AUTO: 40 % (ref 37–47)
HDLC SERPL-MCNC: 49 MG/DL
HGB BLD-MCNC: 12.9 G/DL (ref 12–16)
IRON SATN MFR SERPL: 5 % (ref 15–55)
IRON SERPL-MCNC: 18 UG/DL (ref 40–170)
LDLC SERPL CALC-MCNC: 46 MG/DL
LIPASE SERPL-CCNC: 24 U/L (ref 11–82)
MCH RBC QN AUTO: 25.5 PG (ref 27–33)
MCHC RBC AUTO-ENTMCNC: 32.3 G/DL (ref 32.2–35.5)
MCV RBC AUTO: 79.2 FL (ref 81.4–97.8)
PLATELET # BLD AUTO: 216 K/UL (ref 164–446)
PMV BLD AUTO: 10.2 FL (ref 9–12.9)
POTASSIUM SERPL-SCNC: 4.1 MMOL/L (ref 3.6–5.5)
PROT SERPL-MCNC: 7.3 G/DL (ref 6–8.2)
RBC # BLD AUTO: 5.05 M/UL (ref 4.2–5.4)
SODIUM SERPL-SCNC: 139 MMOL/L (ref 135–145)
T4 FREE SERPL-MCNC: 1.06 NG/DL (ref 0.93–1.7)
TIBC SERPL-MCNC: 345 UG/DL (ref 250–450)
TRIGL SERPL-MCNC: 55 MG/DL (ref 0–149)
TSH SERPL-ACNC: 2.01 UIU/ML (ref 0.35–5.5)
UIBC SERPL-MCNC: 327 UG/DL (ref 110–370)
WBC # BLD AUTO: 4 K/UL (ref 4.8–10.8)

## 2025-01-20 PROCEDURE — 99213 OFFICE O/P EST LOW 20 MIN: CPT | Performed by: NURSE PRACTITIONER

## 2025-01-20 PROCEDURE — 84439 ASSAY OF FREE THYROXINE: CPT

## 2025-01-20 PROCEDURE — 85027 COMPLETE CBC AUTOMATED: CPT

## 2025-01-20 PROCEDURE — 82150 ASSAY OF AMYLASE: CPT

## 2025-01-20 PROCEDURE — 83550 IRON BINDING TEST: CPT

## 2025-01-20 PROCEDURE — 3078F DIAST BP <80 MM HG: CPT | Performed by: NURSE PRACTITIONER

## 2025-01-20 PROCEDURE — 83690 ASSAY OF LIPASE: CPT

## 2025-01-20 PROCEDURE — 3074F SYST BP LT 130 MM HG: CPT | Performed by: NURSE PRACTITIONER

## 2025-01-20 PROCEDURE — 84443 ASSAY THYROID STIM HORMONE: CPT

## 2025-01-20 PROCEDURE — 74160 CT ABDOMEN W/CONTRAST: CPT

## 2025-01-20 PROCEDURE — 80061 LIPID PANEL: CPT

## 2025-01-20 PROCEDURE — 83036 HEMOGLOBIN GLYCOSYLATED A1C: CPT

## 2025-01-20 PROCEDURE — 80053 COMPREHEN METABOLIC PANEL: CPT

## 2025-01-20 PROCEDURE — 36415 COLL VENOUS BLD VENIPUNCTURE: CPT

## 2025-01-20 PROCEDURE — 99214 OFFICE O/P EST MOD 30 MIN: CPT | Performed by: NURSE PRACTITIONER

## 2025-01-20 PROCEDURE — 700117 HCHG RX CONTRAST REV CODE 255: Mod: UD | Performed by: NURSE PRACTITIONER

## 2025-01-20 PROCEDURE — 83540 ASSAY OF IRON: CPT

## 2025-01-20 PROCEDURE — 82306 VITAMIN D 25 HYDROXY: CPT

## 2025-01-20 RX ADMIN — IOHEXOL 100 ML: 350 INJECTION, SOLUTION INTRAVENOUS at 17:02

## 2025-01-20 ASSESSMENT — FIBROSIS 4 INDEX: FIB4 SCORE: 0.5

## 2025-01-20 ASSESSMENT — PATIENT HEALTH QUESTIONNAIRE - PHQ9: CLINICAL INTERPRETATION OF PHQ2 SCORE: 0

## 2025-01-20 NOTE — PROGRESS NOTES
Verbal consent was acquired by the patient to use [employer] ambient listening note generation during this visit     Chief Complaint   Patient presents with    Hospital Follow-up       Subjective:     HPI:   History of Present Illness  The patient presents for evaluation of abdominal pain. She is accompanied by an .    She was previously diagnosed with pancreatitis approximately a year ago, which necessitated hospitalization. She has been experiencing intermittent abdominal pain, which has become more persistent over the past week. The pain is described as a sensation of a ball in her upper abdomen, exacerbated by movement or sitting. Additionally, she reports back pain in the area where a bra would fasten, accompanied by numbness and tingling. Morning nausea is also reported, occasionally leading to vomiting of a greenish fluid. Her appetite remains intact, but she experiences early satiety. She has not consumed any food today. She reports no known allergies to contrast agents used in previous CT scans. There is no possibility of pregnancy.    ALLERGIES  The patient has no known allergies.        No problems updated.    ROS  See HPI     No Known Allergies    Current medicines (including changes today)  No current outpatient medications on file.     No current facility-administered medications for this visit.       Social History     Tobacco Use    Smoking status: Never    Smokeless tobacco: Never   Vaping Use    Vaping status: Never Used   Substance Use Topics    Alcohol use: No     Alcohol/week: 0.0 oz    Drug use: No       Patient Active Problem List    Diagnosis Date Noted    Encounter to establish care 02/23/2022    Impacted cerumen of left ear 05/09/2023    Mass of left axilla 05/09/2023    Elevated LFTs 05/09/2023    Hypomagnesemia 04/28/2023    Intractable nausea and vomiting 04/27/2023    Transaminitis 04/27/2023    Leukopenia 04/27/2023    Anemia 04/27/2023    Hypokalemia 04/27/2023    UTI  "(urinary tract infection) 2023    Pancreatitis, unspecified pancreatitis type 2023    Onychomycosis 2022    Postpartum depression 2020    Encounter for insertion of ParaGard IUD 2020    Postpartum care following  delivery 2020       Family History   Problem Relation Age of Onset    No Known Problems Mother     No Known Problems Father     No Known Problems Sister     No Known Problems Sister     Other Sister 2        unknown    Other Brother 8        car accident           Objective:     /60 (BP Location: Right arm, Patient Position: Sitting, BP Cuff Size: Adult)   Pulse 88   Temp 36 °C (96.8 °F) (Temporal)   Resp 16   Ht 1.676 m (5' 6\")   Wt 82.2 kg (181 lb 3.2 oz)   SpO2 88%  Body mass index is 29.25 kg/m².    Physical Exam:  Physical Exam  Vitals reviewed.   Constitutional:       General: She is awake.      Appearance: Normal appearance. She is well-developed.   HENT:      Head: Normocephalic.   Eyes:      Conjunctiva/sclera: Conjunctivae normal.   Cardiovascular:      Rate and Rhythm: Normal rate.   Pulmonary:      Effort: Pulmonary effort is normal. No respiratory distress.   Musculoskeletal:      Cervical back: Neck supple.   Skin:     General: Skin is warm and dry.   Neurological:      Mental Status: She is alert and oriented to person, place, and time.   Psychiatric:         Mood and Affect: Mood normal.         Behavior: Behavior normal. Behavior is cooperative.              Assessment and Plan:     The following treatment plan was discussed:    Problem List Items Addressed This Visit       Anemia    Relevant Orders    CBC WITHOUT DIFFERENTIAL    IRON/TOTAL IRON BIND     Other Visit Diagnoses       Generalized abdominal pain        Relevant Orders    Comp Metabolic Panel    LIPASE    AMYLASE    CT-ABDOMEN WITH    Screening for cardiovascular condition        Relevant Orders    HEMOGLOBIN A1C    Lipid Profile    FREE THYROXINE    TSH    VITAMIN D,25 " HYDROXY (DEFICIENCY)            Assessment & Plan  1. Abdominal pain.  She reports experiencing abdominal pain similar to previous episodes of pancreatitis, with pain persisting throughout the week and described as a ball-like sensation in the upper abdomen. The pain worsens with movement and sitting. She also reports associated back pain in the area where a bra would fasten, with numbness and tingling. She experiences nausea primarily in the mornings and occasional vomiting of greenish fluid. She feels full quickly after eating small amounts of food. A comprehensive panel of laboratory tests will be ordered to evaluate liver enzymes, as these were previously elevated and other relevant markers associated with pancreatitis. She is experiencing considerable pain and will move forward with stat CT scan of her abdomen to evaluate. She has been advised to seek immediate medical attention at the emergency room if her pain intensifies.  Pending imaging will dictate further interventions.       Any change or worsening of signs or symptoms, patient encouraged to follow-up or report to emergency room for further evaluation. Patient verbalizes understanding and agrees.      PLEASE NOTE: This dictation was created using voice recognition software. I have made every reasonable attempt to correct obvious errors, but I expect that there are errors of grammar and possibly content that I did not discover before finalizing the note.

## (undated) DEVICE — CATHETER IV 20 GA X 1-1/4 ---SURG.& SDS ONLY--- (50EA/BX)

## (undated) DEVICE — MASK ANESTHESIA ADULT  - (100/CA)

## (undated) DEVICE — SUCTION INSTRUMENT YANKAUER BULBOUS TIP W/O VENT (50EA/CA)

## (undated) DEVICE — NEPTUNE 4 PORT MANIFOLD - (20/PK)

## (undated) DEVICE — GLOVE BIOGEL SZ 6.5 SURGICAL PF LTX (50PR/BX 4BX/CA)

## (undated) DEVICE — NEEDLE INSUFFLATION FOR STEP - (12/BX)

## (undated) DEVICE — TRAY SPINAL ANESTHESIA NON-SAFETY (10/CA)

## (undated) DEVICE — KIT ANESTHESIA W/CIRCUIT & 3/LT BAG W/FILTER (20EA/CA)

## (undated) DEVICE — KIT  I.V. START (100EA/CA)

## (undated) DEVICE — SUTURE 0 VICRYL PLUS CT 36 (36PK/BX)"

## (undated) DEVICE — TAPE CLOTH MEDIPORE 6 INCH - (12RL/CA)

## (undated) DEVICE — HEAD HOLDER JUNIOR/ADULT

## (undated) DEVICE — SENSOR SPO2 NEO LNCS ADHESIVE (20/BX) SEE USER NOTES

## (undated) DEVICE — SODIUM CHL IRRIGATION 0.9% 1000ML (12EA/CA)

## (undated) DEVICE — GOWN WARMING STANDARD FLEX - (30/CA)

## (undated) DEVICE — PACK LAP CHOLE OR - (2EA/CA)

## (undated) DEVICE — SET SUCTION/IRRIGATION WITH DISPOSABLE TIP (6/CA )PART #0250-070-520 IS A SUB

## (undated) DEVICE — TUBING CLEARLINK DUO-VENT - C-FLO (48EA/CA)

## (undated) DEVICE — TROCARCANN&SEAL 5X55 ZTHREAD - 12/BX

## (undated) DEVICE — WATER IRRIG. STER. 1500 ML - (9/CA)

## (undated) DEVICE — SUTURE 0 VICRYL PLUS CT-1 - 36 INCH (36/BX)

## (undated) DEVICE — STAPLER SKIN DISP - (6/BX 10BX/CA) VISISTAT

## (undated) DEVICE — SUTURE 3-0 VICRYL PLUS CT-1 - 36 INCH (36/BX)

## (undated) DEVICE — SUTURE GENERAL

## (undated) DEVICE — TUBE E-T HI-LO CUFF 6.5MM (10EA/BX)

## (undated) DEVICE — APPLIER ENDO MEDIUM CLIP (3EA/BX)

## (undated) DEVICE — CANISTER SUCTION RIGID RED 1500CC (40EA/CA)

## (undated) DEVICE — SUTURE 1 CHROMIC CTX ETHICON - (36PK/BX)

## (undated) DEVICE — CANISTER SUCTION 3000ML MECHANICAL FILTER AUTO SHUTOFF MEDI-VAC NONSTERILE LF DISP  (40EA/CA)

## (undated) DEVICE — SUTURE 2-0 CHROMIC GUT CT-1 27 (36PK/BX)"

## (undated) DEVICE — ELECTRODE DUAL RETURN W/ CORD - (50/PK)

## (undated) DEVICE — ADHESIVE DERMABOND HVD MINI (12EA/BX)

## (undated) DEVICE — TUBE CONNECTING SUCTION - CLEAR PLASTIC STERILE 72 IN (50EA/CA)

## (undated) DEVICE — SET EXTENSION WITH 2 PORTS (48EA/CA) ***PART #2C8610 IS A SUBSTITUTE*****

## (undated) DEVICE — LACTATED RINGERS INJ 1000 ML - (14EA/CA 60CA/PF)

## (undated) DEVICE — SLEEVE, VASO, THIGH, MED

## (undated) DEVICE — BLANKET UNDERBODY FULL ACCES - (5/CA)

## (undated) DEVICE — TUBE NG SALEM SUMP 16FR (50EA/CA)

## (undated) DEVICE — GLOVE BIOGEL SZ 6 PF LATEX - (50EA/BX 4BX/CA)

## (undated) DEVICE — GLOVE BIOGEL SZ 8 SURGICAL PF LTX - (50PR/BX 4BX/CA)

## (undated) DEVICE — SPONGE GAUZESTER. 2X2 4-PL - (2/PK 50PK/BX 30BX/CS)

## (undated) DEVICE — TUBING SETDISPOS HIGH FLOW II - (10/BX)

## (undated) DEVICE — SUTURE 4-0 VICRYL PLUS FS-2 - 27 INCH (36/BX)

## (undated) DEVICE — TROCAR Z THREAD11MM OPTICAL - NON BLADED(6/BX)

## (undated) DEVICE — TROCAR5X55 KII SHIELDED SYS - (6/BX)

## (undated) DEVICE — TELFA 8 X 10 BIOSEAL - (50EA/CA)

## (undated) DEVICE — DETERGENT RENUZYME PLUS 10 OZ PACKET (50/BX)

## (undated) DEVICE — PROTECTOR ULNA NERVE - (36PR/CA)

## (undated) DEVICE — SET LEADWIRE 5 LEAD BEDSIDE DISPOSABLE ECG (1SET OF 5/EA)

## (undated) DEVICE — DRESSING TRANSPARENT FILM TEGADERM 2.375 X 2.75"  (100EA/BX)"

## (undated) DEVICE — SHEATH RO 4F 10CM (10EA/BX)

## (undated) DEVICE — PACK ROOM TURNOVER L&D (12/CA)

## (undated) DEVICE — CANNULA W/SEAL 5X100 Z-THRE - ADED KII (12/BX)

## (undated) DEVICE — CATHETER IV NON-SAFETY 18 GA X 1 1/4 (50/BX 4BX/CA)

## (undated) DEVICE — GLOVE SZ 7 BIOGEL PI MICRO - PF LF (50PR/BX 4BX/CA)

## (undated) DEVICE — TROCAR STEP 11MM - (3/CA)

## (undated) DEVICE — TRAY BLADDER CARE W/ 16 FR FOLEY CATHETER STATLOCK  (10/CA)

## (undated) DEVICE — BENZOIN TINCTURE AMPULE

## (undated) DEVICE — SUTURE 0 COATED VICRYL 6-18IN - (12PK/BX)

## (undated) DEVICE — CHLORAPREP 26 ML APPLICATOR - ORANGE TINT(25/CA)

## (undated) DEVICE — SPONGE GAUZE NON-STERILE 4X4 - (2000/CA 10PK/CA)

## (undated) DEVICE — PACK C-SECTION (2EA/CA)

## (undated) DEVICE — DRESSING TEGADERM 8 X 12 - (10/BX 8BX/CA)

## (undated) DEVICE — PENCIL ELECTSURG 10FT HLSTR - WITH BLADE (50EA/CA)

## (undated) DEVICE — CLOSURE SKIN STRIP 1/2 X 4 IN - (STERI STRIP) (50/BX 4BX/CA)

## (undated) DEVICE — DRESSING INTERCEED ABSORBABLE ADHESION BARRIER TC7 (10EA/CA)

## (undated) DEVICE — SLEEVE, SEQUENTIAL CALF REG